# Patient Record
Sex: FEMALE | Race: WHITE | Employment: UNEMPLOYED | URBAN - METROPOLITAN AREA
[De-identification: names, ages, dates, MRNs, and addresses within clinical notes are randomized per-mention and may not be internally consistent; named-entity substitution may affect disease eponyms.]

---

## 2017-01-17 ENCOUNTER — TELEPHONE (OUTPATIENT)
Dept: FAMILY MEDICINE CLINIC | Age: 52
End: 2017-01-17

## 2017-01-26 ENCOUNTER — OFFICE VISIT (OUTPATIENT)
Dept: FAMILY MEDICINE CLINIC | Age: 52
End: 2017-01-26

## 2017-01-26 VITALS
TEMPERATURE: 97.2 F | BODY MASS INDEX: 35.82 KG/M2 | HEIGHT: 63 IN | RESPIRATION RATE: 12 BRPM | DIASTOLIC BLOOD PRESSURE: 82 MMHG | SYSTOLIC BLOOD PRESSURE: 130 MMHG | HEART RATE: 72 BPM | WEIGHT: 202.19 LBS

## 2017-01-26 DIAGNOSIS — F41.9 ANXIETY: ICD-10-CM

## 2017-01-26 DIAGNOSIS — R35.0 FREQUENT URINATION: ICD-10-CM

## 2017-01-26 DIAGNOSIS — R35.0 FREQUENT URINATION: Primary | ICD-10-CM

## 2017-01-26 DIAGNOSIS — E53.8 B12 DEFICIENCY: ICD-10-CM

## 2017-01-26 LAB
BILIRUBIN, POC: ABNORMAL
BLOOD URINE, POC: 25
CLARITY, POC: CLEAR
COLOR, POC: YELLOW
GLUCOSE URINE, POC: ABNORMAL
KETONES, POC: ABNORMAL
LEUKOCYTE EST, POC: ABNORMAL
NITRITE, POC: ABNORMAL
PH, POC: 7.5
PROTEIN, POC: ABNORMAL
SPECIFIC GRAVITY, POC: 1.02
UROBILINOGEN, POC: 2

## 2017-01-26 PROCEDURE — G8484 FLU IMMUNIZE NO ADMIN: HCPCS | Performed by: FAMILY MEDICINE

## 2017-01-26 PROCEDURE — G8419 CALC BMI OUT NRM PARAM NOF/U: HCPCS | Performed by: FAMILY MEDICINE

## 2017-01-26 PROCEDURE — 99213 OFFICE O/P EST LOW 20 MIN: CPT | Performed by: FAMILY MEDICINE

## 2017-01-26 PROCEDURE — 81003 URINALYSIS AUTO W/O SCOPE: CPT | Performed by: FAMILY MEDICINE

## 2017-01-26 PROCEDURE — 1036F TOBACCO NON-USER: CPT | Performed by: FAMILY MEDICINE

## 2017-01-26 PROCEDURE — G8427 DOCREV CUR MEDS BY ELIG CLIN: HCPCS | Performed by: FAMILY MEDICINE

## 2017-01-26 RX ORDER — HYDROCODONE BITARTRATE AND ACETAMINOPHEN 5; 325 MG/1; MG/1
1 TABLET ORAL EVERY 6 HOURS PRN
Qty: 60 TABLET | Refills: 0 | Status: SHIPPED | OUTPATIENT
Start: 2017-01-26 | End: 2017-03-14 | Stop reason: SDUPTHER

## 2017-01-26 RX ORDER — CIPROFLOXACIN 500 MG/1
500 TABLET, FILM COATED ORAL 2 TIMES DAILY
Qty: 20 TABLET | Refills: 0 | Status: SHIPPED | OUTPATIENT
Start: 2017-01-26 | End: 2017-02-05

## 2017-01-26 ASSESSMENT — ENCOUNTER SYMPTOMS
VOMITING: 0
ABDOMINAL PAIN: 0
COUGH: 0
EYES NEGATIVE: 1
NAUSEA: 0
SHORTNESS OF BREATH: 0
CONSTIPATION: 0
DIARRHEA: 0

## 2017-01-28 LAB — URINE CULTURE, ROUTINE: NORMAL

## 2017-02-24 RX ORDER — ESOMEPRAZOLE MAGNESIUM 40 MG/1
40 CAPSULE, DELAYED RELEASE ORAL DAILY
Qty: 60 CAPSULE | Refills: 5 | Status: SHIPPED | OUTPATIENT
Start: 2017-02-24 | End: 2021-08-03

## 2017-03-14 ENCOUNTER — OFFICE VISIT (OUTPATIENT)
Dept: FAMILY MEDICINE CLINIC | Age: 52
End: 2017-03-14

## 2017-03-14 VITALS
HEIGHT: 63 IN | SYSTOLIC BLOOD PRESSURE: 128 MMHG | WEIGHT: 201.38 LBS | TEMPERATURE: 97.5 F | DIASTOLIC BLOOD PRESSURE: 86 MMHG | BODY MASS INDEX: 35.68 KG/M2 | HEART RATE: 72 BPM | RESPIRATION RATE: 12 BRPM

## 2017-03-14 DIAGNOSIS — R10.9 CHRONIC ABDOMINAL PAIN: ICD-10-CM

## 2017-03-14 DIAGNOSIS — K58.9 IRRITABLE BOWEL SYNDROME WITHOUT DIARRHEA: ICD-10-CM

## 2017-03-14 DIAGNOSIS — F41.9 ANXIETY: ICD-10-CM

## 2017-03-14 DIAGNOSIS — M25.562 CHRONIC PAIN OF LEFT KNEE: Primary | ICD-10-CM

## 2017-03-14 DIAGNOSIS — D22.9 ATYPICAL MOLE: ICD-10-CM

## 2017-03-14 DIAGNOSIS — G89.29 CHRONIC PAIN OF LEFT KNEE: Primary | ICD-10-CM

## 2017-03-14 DIAGNOSIS — E53.8 B12 DEFICIENCY: ICD-10-CM

## 2017-03-14 DIAGNOSIS — G89.29 CHRONIC ABDOMINAL PAIN: ICD-10-CM

## 2017-03-14 PROCEDURE — G8427 DOCREV CUR MEDS BY ELIG CLIN: HCPCS | Performed by: FAMILY MEDICINE

## 2017-03-14 PROCEDURE — G8419 CALC BMI OUT NRM PARAM NOF/U: HCPCS | Performed by: FAMILY MEDICINE

## 2017-03-14 PROCEDURE — 1036F TOBACCO NON-USER: CPT | Performed by: FAMILY MEDICINE

## 2017-03-14 PROCEDURE — G8484 FLU IMMUNIZE NO ADMIN: HCPCS | Performed by: FAMILY MEDICINE

## 2017-03-14 PROCEDURE — 99214 OFFICE O/P EST MOD 30 MIN: CPT | Performed by: FAMILY MEDICINE

## 2017-03-14 RX ORDER — HYDROCODONE BITARTRATE AND ACETAMINOPHEN 5; 325 MG/1; MG/1
1 TABLET ORAL EVERY 6 HOURS PRN
Qty: 60 TABLET | Refills: 0 | Status: SHIPPED | OUTPATIENT
Start: 2017-03-14 | End: 2017-05-03 | Stop reason: SDUPTHER

## 2017-03-14 RX ORDER — METHYLPREDNISOLONE 4 MG/1
TABLET ORAL
Qty: 1 KIT | Refills: 0 | Status: SHIPPED | OUTPATIENT
Start: 2017-03-14 | End: 2017-03-20

## 2017-03-14 ASSESSMENT — ENCOUNTER SYMPTOMS
SORE THROAT: 1
SINUS PAIN: 1
COUGH: 1

## 2017-04-03 DIAGNOSIS — M25.562 CHRONIC PAIN OF LEFT KNEE: ICD-10-CM

## 2017-04-03 DIAGNOSIS — F41.9 ANXIETY: ICD-10-CM

## 2017-04-03 DIAGNOSIS — G89.29 CHRONIC PAIN OF LEFT KNEE: ICD-10-CM

## 2017-05-03 ENCOUNTER — PROCEDURE VISIT (OUTPATIENT)
Dept: FAMILY MEDICINE CLINIC | Age: 52
End: 2017-05-03

## 2017-05-03 VITALS
TEMPERATURE: 96.8 F | SYSTOLIC BLOOD PRESSURE: 122 MMHG | HEIGHT: 63 IN | BODY MASS INDEX: 34.75 KG/M2 | OXYGEN SATURATION: 98 % | DIASTOLIC BLOOD PRESSURE: 88 MMHG | HEART RATE: 79 BPM | WEIGHT: 196.13 LBS

## 2017-05-03 DIAGNOSIS — R53.81 OTHER MALAISE AND FATIGUE: ICD-10-CM

## 2017-05-03 DIAGNOSIS — D49.2 SKIN NEOPLASM: ICD-10-CM

## 2017-05-03 DIAGNOSIS — F41.9 ANXIETY: ICD-10-CM

## 2017-05-03 DIAGNOSIS — K58.9 IRRITABLE BOWEL SYNDROME WITHOUT DIARRHEA: ICD-10-CM

## 2017-05-03 DIAGNOSIS — E78.5 DYSLIPIDEMIA: ICD-10-CM

## 2017-05-03 DIAGNOSIS — L91.8 SKIN TAG: ICD-10-CM

## 2017-05-03 DIAGNOSIS — M25.50 ARTHRALGIA, UNSPECIFIED JOINT: Primary | ICD-10-CM

## 2017-05-03 DIAGNOSIS — M25.50 ARTHRALGIA, UNSPECIFIED JOINT: ICD-10-CM

## 2017-05-03 DIAGNOSIS — E53.8 B12 DEFICIENCY: ICD-10-CM

## 2017-05-03 DIAGNOSIS — R53.83 OTHER MALAISE AND FATIGUE: ICD-10-CM

## 2017-05-03 DIAGNOSIS — E55.9 VITAMIN D DEFICIENCY: ICD-10-CM

## 2017-05-03 LAB
ALBUMIN SERPL-MCNC: 4.3 G/DL (ref 3.9–4.9)
ALP BLD-CCNC: 84 U/L (ref 40–130)
ALT SERPL-CCNC: 54 U/L (ref 0–33)
ANION GAP SERPL CALCULATED.3IONS-SCNC: 13 MEQ/L (ref 7–13)
AST SERPL-CCNC: 66 U/L (ref 0–35)
BASOPHILS ABSOLUTE: 0.1 K/UL (ref 0–0.2)
BASOPHILS RELATIVE PERCENT: 0.9 %
BILIRUB SERPL-MCNC: 0.4 MG/DL (ref 0–1.2)
BUN BLDV-MCNC: 12 MG/DL (ref 6–20)
CALCIUM SERPL-MCNC: 8.9 MG/DL (ref 8.6–10.2)
CHLORIDE BLD-SCNC: 102 MEQ/L (ref 98–107)
CHOLESTEROL, TOTAL: 156 MG/DL (ref 0–199)
CO2: 25 MEQ/L (ref 22–29)
CREAT SERPL-MCNC: 0.46 MG/DL (ref 0.5–0.9)
EOSINOPHILS ABSOLUTE: 0.3 K/UL (ref 0–0.7)
EOSINOPHILS RELATIVE PERCENT: 2.5 %
FOLATE: 8.1 NG/ML (ref 7.3–26.1)
GFR AFRICAN AMERICAN: >60
GFR NON-AFRICAN AMERICAN: >60
GLOBULIN: 2.7 G/DL (ref 2.3–3.5)
GLUCOSE BLD-MCNC: 86 MG/DL (ref 74–109)
HCT VFR BLD CALC: 46.1 % (ref 37–47)
HDLC SERPL-MCNC: 28 MG/DL (ref 40–59)
HEMOGLOBIN: 15.5 G/DL (ref 12–16)
LDL CHOLESTEROL CALCULATED: 71 MG/DL (ref 0–129)
LYMPHOCYTES ABSOLUTE: 3 K/UL (ref 1–4.8)
LYMPHOCYTES RELATIVE PERCENT: 24.3 %
MCH RBC QN AUTO: 30.8 PG (ref 27–31.3)
MCHC RBC AUTO-ENTMCNC: 33.7 % (ref 33–37)
MCV RBC AUTO: 91.4 FL (ref 82–100)
MONOCYTES ABSOLUTE: 0.7 K/UL (ref 0.2–0.8)
MONOCYTES RELATIVE PERCENT: 6 %
NEUTROPHILS ABSOLUTE: 8.2 K/UL (ref 1.4–6.5)
NEUTROPHILS RELATIVE PERCENT: 66.3 %
PDW BLD-RTO: 14 % (ref 11.5–14.5)
PLATELET # BLD: 351 K/UL (ref 130–400)
POTASSIUM SERPL-SCNC: 4.4 MEQ/L (ref 3.5–5.1)
RBC # BLD: 5.04 M/UL (ref 4.2–5.4)
RHEUMATOID FACTOR: <10 IU/ML (ref 0–14)
SEDIMENTATION RATE, ERYTHROCYTE: 13 MM (ref 0–20)
SODIUM BLD-SCNC: 140 MEQ/L (ref 132–144)
T4 FREE: 1.04 NG/DL (ref 0.93–1.7)
TOTAL PROTEIN: 7 G/DL (ref 6.4–8.1)
TRIGL SERPL-MCNC: 283 MG/DL (ref 0–200)
TSH SERPL DL<=0.05 MIU/L-ACNC: 2.63 UIU/ML (ref 0.27–4.2)
VITAMIN B-12: 666 PG/ML (ref 211–946)
VITAMIN D 25-HYDROXY: 21.8 NG/ML (ref 30–100)
WBC # BLD: 12.4 K/UL (ref 4.8–10.8)

## 2017-05-03 PROCEDURE — 1036F TOBACCO NON-USER: CPT | Performed by: FAMILY MEDICINE

## 2017-05-03 PROCEDURE — G8427 DOCREV CUR MEDS BY ELIG CLIN: HCPCS | Performed by: FAMILY MEDICINE

## 2017-05-03 PROCEDURE — 11200 RMVL SKIN TAGS UP TO&INC 15: CPT | Performed by: FAMILY MEDICINE

## 2017-05-03 PROCEDURE — 99213 OFFICE O/P EST LOW 20 MIN: CPT | Performed by: FAMILY MEDICINE

## 2017-05-03 PROCEDURE — G8419 CALC BMI OUT NRM PARAM NOF/U: HCPCS | Performed by: FAMILY MEDICINE

## 2017-05-03 RX ORDER — HYDROCODONE BITARTRATE AND ACETAMINOPHEN 5; 325 MG/1; MG/1
1 TABLET ORAL EVERY 6 HOURS PRN
Qty: 60 TABLET | Refills: 0 | Status: SHIPPED | OUTPATIENT
Start: 2017-05-03 | End: 2017-07-12 | Stop reason: SDUPTHER

## 2017-05-04 LAB
ANA INTERPRETATION: NORMAL
ANTI-NUCLEAR ANTIBODY (ANA): NEGATIVE

## 2017-05-10 RX ORDER — ERGOCALCIFEROL 1.25 MG/1
50000 CAPSULE ORAL WEEKLY
Qty: 12 CAPSULE | Refills: 1 | Status: SHIPPED | OUTPATIENT
Start: 2017-05-10 | End: 2017-10-12 | Stop reason: SDUPTHER

## 2017-07-12 ENCOUNTER — OFFICE VISIT (OUTPATIENT)
Dept: FAMILY MEDICINE CLINIC | Age: 52
End: 2017-07-12

## 2017-07-12 VITALS
HEIGHT: 63 IN | OXYGEN SATURATION: 97 % | SYSTOLIC BLOOD PRESSURE: 130 MMHG | HEART RATE: 79 BPM | BODY MASS INDEX: 33.73 KG/M2 | TEMPERATURE: 96.7 F | WEIGHT: 190.38 LBS | DIASTOLIC BLOOD PRESSURE: 88 MMHG

## 2017-07-12 DIAGNOSIS — G89.29 CHRONIC PAIN OF BOTH KNEES: ICD-10-CM

## 2017-07-12 DIAGNOSIS — M25.561 CHRONIC PAIN OF BOTH KNEES: ICD-10-CM

## 2017-07-12 DIAGNOSIS — K21.00 GASTROESOPHAGEAL REFLUX DISEASE WITH ESOPHAGITIS: Primary | ICD-10-CM

## 2017-07-12 DIAGNOSIS — M25.562 CHRONIC PAIN OF BOTH KNEES: ICD-10-CM

## 2017-07-12 PROCEDURE — G8427 DOCREV CUR MEDS BY ELIG CLIN: HCPCS | Performed by: FAMILY MEDICINE

## 2017-07-12 PROCEDURE — G8419 CALC BMI OUT NRM PARAM NOF/U: HCPCS | Performed by: FAMILY MEDICINE

## 2017-07-12 PROCEDURE — 1036F TOBACCO NON-USER: CPT | Performed by: FAMILY MEDICINE

## 2017-07-12 PROCEDURE — 99213 OFFICE O/P EST LOW 20 MIN: CPT | Performed by: FAMILY MEDICINE

## 2017-07-12 RX ORDER — ESOMEPRAZOLE MAGNESIUM 40 MG/1
CAPSULE, DELAYED RELEASE ORAL
Qty: 60 CAPSULE | Refills: 3
Start: 2017-07-12 | End: 2017-10-12 | Stop reason: SDUPTHER

## 2017-07-12 RX ORDER — HYDROCODONE BITARTRATE AND ACETAMINOPHEN 5; 325 MG/1; MG/1
1 TABLET ORAL EVERY 6 HOURS PRN
Qty: 60 TABLET | Refills: 0 | Status: SHIPPED | OUTPATIENT
Start: 2017-07-12 | End: 2017-10-12 | Stop reason: SDUPTHER

## 2017-10-12 ENCOUNTER — OFFICE VISIT (OUTPATIENT)
Dept: FAMILY MEDICINE CLINIC | Age: 52
End: 2017-10-12

## 2017-10-12 VITALS
HEART RATE: 68 BPM | SYSTOLIC BLOOD PRESSURE: 114 MMHG | TEMPERATURE: 97.5 F | WEIGHT: 188.5 LBS | DIASTOLIC BLOOD PRESSURE: 86 MMHG | HEIGHT: 63 IN | BODY MASS INDEX: 33.4 KG/M2 | RESPIRATION RATE: 16 BRPM

## 2017-10-12 DIAGNOSIS — J06.9 ACUTE UPPER RESPIRATORY INFECTION: Primary | ICD-10-CM

## 2017-10-12 DIAGNOSIS — R10.9 CHRONIC ABDOMINAL PAIN: ICD-10-CM

## 2017-10-12 DIAGNOSIS — E55.9 VITAMIN D DEFICIENCY: ICD-10-CM

## 2017-10-12 DIAGNOSIS — E53.8 B12 DEFICIENCY: ICD-10-CM

## 2017-10-12 DIAGNOSIS — G89.29 CHRONIC ABDOMINAL PAIN: ICD-10-CM

## 2017-10-12 LAB — VITAMIN D 25-HYDROXY: 34.6 NG/ML (ref 30–100)

## 2017-10-12 PROCEDURE — G8419 CALC BMI OUT NRM PARAM NOF/U: HCPCS | Performed by: FAMILY MEDICINE

## 2017-10-12 PROCEDURE — 1036F TOBACCO NON-USER: CPT | Performed by: FAMILY MEDICINE

## 2017-10-12 PROCEDURE — 99213 OFFICE O/P EST LOW 20 MIN: CPT | Performed by: FAMILY MEDICINE

## 2017-10-12 PROCEDURE — G8427 DOCREV CUR MEDS BY ELIG CLIN: HCPCS | Performed by: FAMILY MEDICINE

## 2017-10-12 PROCEDURE — G8484 FLU IMMUNIZE NO ADMIN: HCPCS | Performed by: FAMILY MEDICINE

## 2017-10-12 RX ORDER — ERGOCALCIFEROL 1.25 MG/1
50000 CAPSULE ORAL WEEKLY
Qty: 12 CAPSULE | Refills: 1 | Status: SHIPPED | OUTPATIENT
Start: 2017-10-12

## 2017-10-12 RX ORDER — HYDROCODONE BITARTRATE AND ACETAMINOPHEN 5; 325 MG/1; MG/1
1 TABLET ORAL EVERY 6 HOURS PRN
Qty: 60 TABLET | Refills: 0 | Status: SHIPPED | OUTPATIENT
Start: 2017-10-12 | End: 2018-01-05 | Stop reason: SDUPTHER

## 2017-10-12 ASSESSMENT — PATIENT HEALTH QUESTIONNAIRE - PHQ9
1. LITTLE INTEREST OR PLEASURE IN DOING THINGS: 0
SUM OF ALL RESPONSES TO PHQ9 QUESTIONS 1 & 2: 0
2. FEELING DOWN, DEPRESSED OR HOPELESS: 0
SUM OF ALL RESPONSES TO PHQ QUESTIONS 1-9: 0

## 2017-10-12 NOTE — PROGRESS NOTES
Social History     Social History    Marital status:      Spouse name: N/A    Number of children: N/A    Years of education: N/A     Occupational History    Not on file. Social History Main Topics    Smoking status: Former Smoker     Packs/day: 1.00     Years: 12.00     Types: Cigarettes     Quit date: 6/30/2012    Smokeless tobacco: Never Used    Alcohol use No    Drug use: No    Sexual activity: Not on file     Other Topics Concern    Not on file     Social History Narrative    No narrative on file     Family History   Problem Relation Age of Onset    Diabetes Mother     Heart Disease Mother      stints    Diabetes Sister     High Blood Pressure Sister      Past Medical History:   Diagnosis Date    Anxiety     GERD (gastroesophageal reflux disease)     History of cellulitis and abscess     Irritable bowel syndrome      Past Surgical History:   Procedure Laterality Date    HERNIA REPAIR  1992, 2012    HYSTERECTOMY  2009    OTHER SURGICAL HISTORY  02/11/15    DR Ariadna Méndez  EXCISION OF LT LOWER QUAD NONHEALING SKIN LESION    SMALL INTESTINE SURGERY  2012    bowel obstruction @ [address] [facility] [address] Ruddy Jimenez  [id number]    DR. MAGALLON         REVIEW OF SYSTEMS:   REVIEW OF SYSTEMS:   Patient seen today for exam.  Denies any problems with hearing, headaches or vision. Denies any shortness of breath, chest pain, nausea or vomiting. No black stool, no blood in the stool. No heartburn. Denies any problems with constipation or diarrhea either. No dysuria type symptoms. Objective:     /86 (Site: Left Arm, Position: Sitting, Cuff Size: Medium Adult)   Pulse 68   Temp 97.5 °F (36.4 °C)   Resp 16   Ht 5' 3\" (1.6 m)   Wt 188 lb 8 oz (85.5 kg)   LMP 01/01/2009   BMI 33.39 kg/m²     Physical Exam      PHYSICAL EXAMINATION:  Vital signs as noted. Alert, oriented in no acute distress. HEENT:  TMs are showing fluid bilaterally. Pharynx reveals postnasal drainage noted. Positive pain over sinuses and forehead  Pos shoddy adenopathy noted bilaterallyNECK: supple. HEART:  RRR without gallops. LUNGS:  clear to auscultation, no wheezing or rhonchi. ABDOMEN:  soft and nontender, bowel sounds positive. EXTREMITIES:  no edema. SKIN: without any changes      Assessment:      1. Acute upper respiratory infection     2. Vitamin D deficiency  vitamin D (ERGOCALCIFEROL) 71950 units CAPS capsule    HYDROcodone-acetaminophen (NORCO) 5-325 MG per tablet    Vitamin D 25 Hydroxy   3. B12 deficiency     4. Chronic abdominal pain               Plan:        Orders Placed This Encounter   Medications    vitamin D (ERGOCALCIFEROL) 82203 units CAPS capsule     Sig: Take 1 capsule by mouth once a week     Dispense:  12 capsule     Refill:  1    HYDROcodone-acetaminophen (NORCO) 5-325 MG per tablet     Sig: Take 1 tablet by mouth every 6 hours as needed for Pain .  Earliest Fill Date: 10/12/17     Dispense:  60 tablet     Refill:  0     Orders Placed This Encounter   Procedures    Vitamin D 25 Hydroxy     Standing Status:   Future     Number of Occurrences:   1     Standing Expiration Date:   10/12/2018           Health Maintenance Due   Topic Date Due    HIV screen  10/23/1982    DTaP/Tdap/Td vaccine (1 - Tdap) 10/23/1986    Flu vaccine (1) 09/01/2017             Controlled Substances Monitoring:        Since the pain pills are helping somewhat she'll continue that she is barely smoking down to one to serous per day and doesn't take Xanax at all anymore she's doing much better with her stress regarding this mesh situation should take the antibiotic solution worse over this next week            If anything worsens or changes please call us at once , follow-up in the office as planned,

## 2018-01-05 ENCOUNTER — OFFICE VISIT (OUTPATIENT)
Dept: FAMILY MEDICINE CLINIC | Age: 53
End: 2018-01-05

## 2018-01-05 VITALS
TEMPERATURE: 97.1 F | DIASTOLIC BLOOD PRESSURE: 86 MMHG | WEIGHT: 188.25 LBS | HEIGHT: 63 IN | RESPIRATION RATE: 12 BRPM | BODY MASS INDEX: 33.36 KG/M2 | SYSTOLIC BLOOD PRESSURE: 136 MMHG | HEART RATE: 68 BPM

## 2018-01-05 DIAGNOSIS — F41.9 ANXIETY: ICD-10-CM

## 2018-01-05 DIAGNOSIS — R10.84 GENERALIZED ABDOMINAL PAIN: Primary | ICD-10-CM

## 2018-01-05 DIAGNOSIS — K58.9 IRRITABLE BOWEL SYNDROME WITHOUT DIARRHEA: ICD-10-CM

## 2018-01-05 DIAGNOSIS — E55.9 VITAMIN D DEFICIENCY: ICD-10-CM

## 2018-01-05 DIAGNOSIS — E53.8 B12 DEFICIENCY: ICD-10-CM

## 2018-01-05 PROCEDURE — 1036F TOBACCO NON-USER: CPT | Performed by: FAMILY MEDICINE

## 2018-01-05 PROCEDURE — 3017F COLORECTAL CA SCREEN DOC REV: CPT | Performed by: FAMILY MEDICINE

## 2018-01-05 PROCEDURE — G8427 DOCREV CUR MEDS BY ELIG CLIN: HCPCS | Performed by: FAMILY MEDICINE

## 2018-01-05 PROCEDURE — G8484 FLU IMMUNIZE NO ADMIN: HCPCS | Performed by: FAMILY MEDICINE

## 2018-01-05 PROCEDURE — 99214 OFFICE O/P EST MOD 30 MIN: CPT | Performed by: FAMILY MEDICINE

## 2018-01-05 PROCEDURE — G8419 CALC BMI OUT NRM PARAM NOF/U: HCPCS | Performed by: FAMILY MEDICINE

## 2018-01-05 RX ORDER — HYDROCODONE BITARTRATE AND ACETAMINOPHEN 5; 325 MG/1; MG/1
1 TABLET ORAL EVERY 6 HOURS PRN
Qty: 60 TABLET | Refills: 0 | Status: SHIPPED | OUTPATIENT
Start: 2018-01-05 | End: 2018-02-05 | Stop reason: SDUPTHER

## 2018-01-05 RX ORDER — DULOXETIN HYDROCHLORIDE 30 MG/1
30 CAPSULE, DELAYED RELEASE ORAL DAILY
Qty: 30 CAPSULE | Refills: 3 | Status: SHIPPED | OUTPATIENT
Start: 2018-01-05 | End: 2021-08-03

## 2018-01-17 ENCOUNTER — OFFICE VISIT (OUTPATIENT)
Dept: SURGERY | Age: 53
End: 2018-01-17

## 2018-01-17 VITALS
BODY MASS INDEX: 32.78 KG/M2 | SYSTOLIC BLOOD PRESSURE: 132 MMHG | WEIGHT: 185 LBS | OXYGEN SATURATION: 93 % | DIASTOLIC BLOOD PRESSURE: 62 MMHG | HEART RATE: 97 BPM | HEIGHT: 63 IN

## 2018-01-17 DIAGNOSIS — R10.84 GENERALIZED ABDOMINAL PAIN: Primary | ICD-10-CM

## 2018-01-17 PROCEDURE — G8484 FLU IMMUNIZE NO ADMIN: HCPCS | Performed by: COLON & RECTAL SURGERY

## 2018-01-17 PROCEDURE — 99244 OFF/OP CNSLTJ NEW/EST MOD 40: CPT | Performed by: COLON & RECTAL SURGERY

## 2018-01-17 PROCEDURE — G8427 DOCREV CUR MEDS BY ELIG CLIN: HCPCS | Performed by: COLON & RECTAL SURGERY

## 2018-01-17 PROCEDURE — G8417 CALC BMI ABV UP PARAM F/U: HCPCS | Performed by: COLON & RECTAL SURGERY

## 2018-01-17 PROCEDURE — 3017F COLORECTAL CA SCREEN DOC REV: CPT | Performed by: COLON & RECTAL SURGERY

## 2018-01-17 ASSESSMENT — ENCOUNTER SYMPTOMS
CONSTIPATION: 1
EYES NEGATIVE: 1
NAUSEA: 1
ABDOMINAL DISTENTION: 1
DIARRHEA: 1
WHEEZING: 1
ABDOMINAL PAIN: 1
SHORTNESS OF BREATH: 1
ALLERGIC/IMMUNOLOGIC NEGATIVE: 1
APNEA: 1

## 2018-01-22 ENCOUNTER — TELEPHONE (OUTPATIENT)
Dept: SURGERY | Age: 53
End: 2018-01-22

## 2018-01-31 NOTE — TELEPHONE ENCOUNTER
Called pt   Told pt that the pain management referral has been sent.   Pt said they will call and make there appt with pain management tomorrow and thank you

## 2018-02-21 ENCOUNTER — INITIAL CONSULT (OUTPATIENT)
Dept: PODIATRY | Facility: CLINIC | Age: 53
End: 2018-02-21

## 2018-02-21 VITALS — TEMPERATURE: 97.6 F | BODY MASS INDEX: 33.12 KG/M2 | WEIGHT: 194 LBS | HEIGHT: 64 IN

## 2018-02-21 DIAGNOSIS — M79.672 PAIN IN BOTH FEET: Primary | ICD-10-CM

## 2018-02-21 DIAGNOSIS — M79.671 PAIN IN BOTH FEET: Primary | ICD-10-CM

## 2018-02-21 DIAGNOSIS — G57.62 MORTON'S NEUROMA, LEFT: ICD-10-CM

## 2018-02-21 DIAGNOSIS — G57.61 MORTON NEUROMA, RIGHT: ICD-10-CM

## 2018-02-21 ASSESSMENT — ENCOUNTER SYMPTOMS
SHORTNESS OF BREATH: 0
NAUSEA: 0
CONSTIPATION: 0
BACK PAIN: 0
DIARRHEA: 0

## 2018-02-21 NOTE — PROGRESS NOTES
Jaimie Monday  (1965)    2/21/18    Subjective:     Elrin Gabriel Monday is 50 y.o. female who complains today of:    Chief Complaint   Patient presents with    Foot Pain     bilateral; \"pins and needles feeling\"        HPI: The patient presents with complaint of bilateral foot pain. She states that the left foot pain is worse than the right foot. The pain has been present for the past 18 months. She states that pain is getting worse. She describes having tingling sensation as well as throbbing sensations. She states that occasionally she will fee like he has a pebble in her left shoe when there is nothing there. She states that massaging the area will relieve the pain temporarily. She states that she has tightness of the hamstring and calf muscles, she thinks this has gotten worse worse since her abdominal surgeries. She does report a family history of diabetes but her laboratory levels have never shown diabetes. Review of Systems   Constitutional: Negative for fever. HENT: Negative for hearing loss. Respiratory: Negative for shortness of breath. Gastrointestinal: Negative for constipation, diarrhea and nausea. Genitourinary: Negative for difficulty urinating. Musculoskeletal: Negative for back pain and neck pain. Skin: Negative for rash. Neurological: Negative for headaches. Hematological: Bruises/bleeds easily. Psychiatric/Behavioral: Positive for sleep disturbance. She has not tried physical therapy. Date of last of last PT treatment: none    The patient is not a diabetic. Allergies:  Macrobid [nitrofurantoin monohydrate macrocrystals] and Flagyl [metronidazole]    Current Outpatient Prescriptions on File Prior to Visit   Medication Sig Dispense Refill    HYDROcodone-acetaminophen (NORCO) 5-325 MG per tablet Take 1 tablet by mouth 3 times daily as needed for Pain for up to 30 days.  Earliest Fill Date: 2/5/18 90 tablet 0    gabapentin (NEURONTIN) 300 MG

## 2018-03-02 ENCOUNTER — OFFICE VISIT (OUTPATIENT)
Dept: PODIATRY | Facility: CLINIC | Age: 53
End: 2018-03-02

## 2018-03-02 VITALS — WEIGHT: 183 LBS | BODY MASS INDEX: 31.24 KG/M2 | HEIGHT: 64 IN

## 2018-03-02 DIAGNOSIS — G57.62 LESION OF LEFT PLANTAR NERVE: ICD-10-CM

## 2018-03-02 DIAGNOSIS — M79.672 PAIN IN BOTH FEET: Primary | ICD-10-CM

## 2018-03-02 DIAGNOSIS — M79.671 PAIN IN BOTH FEET: Primary | ICD-10-CM

## 2018-03-02 DIAGNOSIS — G57.61 LESION OF RIGHT PLANTAR NERVE: ICD-10-CM

## 2018-03-02 ASSESSMENT — ENCOUNTER SYMPTOMS
SHORTNESS OF BREATH: 0
DIARRHEA: 0
NAUSEA: 0
CONSTIPATION: 0
BACK PAIN: 0

## 2018-03-02 NOTE — PROGRESS NOTES
syndrome      Past Surgical History:   Procedure Laterality Date    HERNIA REPAIR  1992, 2012    HYSTERECTOMY  2009    OTHER SURGICAL HISTORY  02/11/15    DR Jack Day  EXCISION OF LT LOWER QUAD NONHEALING SKIN LESION    SMALL INTESTINE SURGERY  2012    bowel obstruction @ [facility] Dr. Kody Tucker  [id number]    DR. MAGALLON     Social History     Social History    Marital status:      Spouse name: N/A    Number of children: N/A    Years of education: N/A     Occupational History    Not on file. Social History Main Topics    Smoking status: Former Smoker     Packs/day: 1.00     Years: 12.00     Types: Cigarettes     Quit date: 6/30/2012    Smokeless tobacco: Never Used    Alcohol use No    Drug use: No    Sexual activity: Not on file     Other Topics Concern    Not on file     Social History Narrative    No narrative on file     Family History   Problem Relation Age of Onset    Diabetes Mother     Heart Disease Mother      stints    Diabetes Sister     High Blood Pressure Sister            Objective:   Vitals:  Ht 5' 4\" (1.626 m)   Wt 183 lb (83 kg)   LMP 01/01/2009   BMI 31.41 kg/m² Pain Score:   5    Physical Exam    Vascular:   Dorsalis pedis pulse is graded at 2/4 bilateral foot. Posterior tibial pulse is graded at 1/4 bilateral foot. There is no edema noted to the bilateral lower extremity. Capillary refill is immediate bilateral hallux. Hair growth is present bilaterally.     Neurological:   Protective sensation is intact bilaterally, diminished light touch sensation noted to the left hallux. Vibratory sensation is intact bilaterally. Sharp/dull sensation is intact bilaterally. Patellar deep tendon reflex is graded at 2/4 bilaterally. Achilles tendon deep tendon reflex is graded at 1/4 bilaterally. The Babinski response is negative bilaterally. No ankle clonus is noted bilaterally.   (+) Tinel's sign with percussion of the left tibial nerve. (+) Omar sign and radiating pain with palpation of the common digital nerve left foot third metatarsal interspace. (-) Omar sign and point tenderness with palpation of the common digital nerve right foot third metatarsal interspace.     Dermatological:  No open lesions noted bilateral foot. Normal skin texture and turgor noted bilaterally. Webspaces are intact bilateral foot.     Musculoskeletal:  Rectus foot type noted bilaterally. Manual muscle strength is graded at 5/5 for all groups tested bilateral foot. No gross static deformities noted. No pain or crepitus noted with active or passive range of motion of the joints of the foot or ankle bilaterally. Decreased ankle joint dorsiflexion is noted bilaterally. There is splaying of the left foot toes 3 and 4 with simulated weightbearing.       Assessment:     1. Pain in both feet     2. Lesion of right plantar nerve     3. Lesion of left plantar nerve         Plan:     Bilateral neuroma: I have instructed the patient to continue the previously enacted conservative measures. The patient is evaluated and found to have a biomechanical dysfunction causing  significant pain. It is recommended that use of a custom orthotic be undertaken to reduce this pain. After obtaining appropriate range of motion measurements of the hindfoot to forefoot relationship, negative plaster casting impressions of the right and left feet were made using a neutral suspension technique. Plaster casting material () was used for the right and left foot to make the  impressions. These functional foot orthotics will be packaged, handled, and mailed to an outside  laboratory and fashioned as removable devices. The patient will be contacted when the orthotics have arrived. Follow up:  Return for dispensing of orthotics.     Roosevelt Luna DPM

## 2018-04-14 ENCOUNTER — APPOINTMENT (OUTPATIENT)
Dept: CT IMAGING | Age: 53
End: 2018-04-14
Payer: COMMERCIAL

## 2018-04-14 ENCOUNTER — HOSPITAL ENCOUNTER (EMERGENCY)
Age: 53
Discharge: HOME | End: 2018-04-14
Attending: EMERGENCY MEDICINE
Payer: COMMERCIAL

## 2018-04-14 VITALS
WEIGHT: 181 LBS | TEMPERATURE: 97.9 F | SYSTOLIC BLOOD PRESSURE: 150 MMHG | OXYGEN SATURATION: 98 % | HEIGHT: 64 IN | BODY MASS INDEX: 30.9 KG/M2 | RESPIRATION RATE: 18 BRPM | HEART RATE: 79 BPM | DIASTOLIC BLOOD PRESSURE: 89 MMHG

## 2018-04-14 DIAGNOSIS — R10.31 RIGHT LOWER QUADRANT ABDOMINAL PAIN: Primary | ICD-10-CM

## 2018-04-14 LAB
ALBUMIN SERPL-MCNC: 4.6 G/DL (ref 3.9–4.9)
ALP BLD-CCNC: 71 U/L (ref 40–130)
ALT SERPL-CCNC: 47 U/L (ref 0–33)
AMYLASE: 44 U/L (ref 28–100)
ANION GAP SERPL CALCULATED.3IONS-SCNC: 15 MEQ/L (ref 7–13)
AST SERPL-CCNC: 40 U/L (ref 0–35)
BASOPHILS ABSOLUTE: 0 K/UL (ref 0–0.2)
BASOPHILS RELATIVE PERCENT: 0.3 %
BILIRUB SERPL-MCNC: 0.4 MG/DL (ref 0–1.2)
BILIRUBIN URINE: NEGATIVE
BLOOD, URINE: NEGATIVE
BUN BLDV-MCNC: 17 MG/DL (ref 6–20)
CALCIUM SERPL-MCNC: 9.3 MG/DL (ref 8.6–10.2)
CHLORIDE BLD-SCNC: 99 MEQ/L (ref 98–107)
CLARITY: CLEAR
CO2: 26 MEQ/L (ref 22–29)
COLOR: YELLOW
CREAT SERPL-MCNC: 0.4 MG/DL (ref 0.5–0.9)
EOSINOPHILS ABSOLUTE: 0.3 K/UL (ref 0–0.7)
EOSINOPHILS RELATIVE PERCENT: 2.6 %
GFR AFRICAN AMERICAN: >60
GFR NON-AFRICAN AMERICAN: >60
GLOBULIN: 2.2 G/DL (ref 2.3–3.5)
GLUCOSE BLD-MCNC: 88 MG/DL (ref 74–109)
GLUCOSE URINE: NEGATIVE MG/DL
HCT VFR BLD CALC: 45.1 % (ref 37–47)
HEMOGLOBIN: 15.3 G/DL (ref 12–16)
INR BLD: 1
KETONES, URINE: NEGATIVE MG/DL
LEUKOCYTE ESTERASE, URINE: NEGATIVE
LIPASE: 32 U/L (ref 13–60)
LYMPHOCYTES ABSOLUTE: 3.6 K/UL (ref 1–4.8)
LYMPHOCYTES RELATIVE PERCENT: 30 %
MCH RBC QN AUTO: 31.7 PG (ref 27–31.3)
MCHC RBC AUTO-ENTMCNC: 34 % (ref 33–37)
MCV RBC AUTO: 93.2 FL (ref 82–100)
MONOCYTES ABSOLUTE: 0.8 K/UL (ref 0.2–0.8)
MONOCYTES RELATIVE PERCENT: 6.6 %
NEUTROPHILS ABSOLUTE: 7.3 K/UL (ref 1.4–6.5)
NEUTROPHILS RELATIVE PERCENT: 60.5 %
NITRITE, URINE: NEGATIVE
PDW BLD-RTO: 13.3 % (ref 11.5–14.5)
PH UA: 5 (ref 5–9)
PLATELET # BLD: 329 K/UL (ref 130–400)
POTASSIUM SERPL-SCNC: 4.3 MEQ/L (ref 3.5–5.1)
PROTEIN UA: NEGATIVE MG/DL
PROTHROMBIN TIME: 10.7 SEC (ref 9.6–12.3)
RBC # BLD: 4.84 M/UL (ref 4.2–5.4)
SODIUM BLD-SCNC: 140 MEQ/L (ref 132–144)
SPECIFIC GRAVITY UA: 1.02 (ref 1–1.03)
TOTAL PROTEIN: 6.8 G/DL (ref 6.4–8.1)
URINE REFLEX TO CULTURE: NORMAL
UROBILINOGEN, URINE: 0.2 E.U./DL
WBC # BLD: 12 K/UL (ref 4.8–10.8)

## 2018-04-14 PROCEDURE — 96374 THER/PROPH/DIAG INJ IV PUSH: CPT

## 2018-04-14 PROCEDURE — 81003 URINALYSIS AUTO W/O SCOPE: CPT

## 2018-04-14 PROCEDURE — 80053 COMPREHEN METABOLIC PANEL: CPT

## 2018-04-14 PROCEDURE — 83690 ASSAY OF LIPASE: CPT

## 2018-04-14 PROCEDURE — 82150 ASSAY OF AMYLASE: CPT

## 2018-04-14 PROCEDURE — 85610 PROTHROMBIN TIME: CPT

## 2018-04-14 PROCEDURE — 2580000003 HC RX 258: Performed by: EMERGENCY MEDICINE

## 2018-04-14 PROCEDURE — 36415 COLL VENOUS BLD VENIPUNCTURE: CPT

## 2018-04-14 PROCEDURE — 85025 COMPLETE CBC W/AUTO DIFF WBC: CPT

## 2018-04-14 PROCEDURE — 96375 TX/PRO/DX INJ NEW DRUG ADDON: CPT

## 2018-04-14 PROCEDURE — 74176 CT ABD & PELVIS W/O CONTRAST: CPT

## 2018-04-14 PROCEDURE — 99284 EMERGENCY DEPT VISIT MOD MDM: CPT

## 2018-04-14 PROCEDURE — 6360000002 HC RX W HCPCS: Performed by: EMERGENCY MEDICINE

## 2018-04-14 RX ORDER — ONDANSETRON 2 MG/ML
4 INJECTION INTRAMUSCULAR; INTRAVENOUS ONCE
Status: COMPLETED | OUTPATIENT
Start: 2018-04-14 | End: 2018-04-14

## 2018-04-14 RX ORDER — DICYCLOMINE HYDROCHLORIDE 10 MG/1
10 CAPSULE ORAL EVERY 6 HOURS PRN
Qty: 20 CAPSULE | Refills: 0 | Status: SHIPPED | OUTPATIENT
Start: 2018-04-14 | End: 2021-08-03

## 2018-04-14 RX ORDER — HYDROCODONE BITARTRATE AND ACETAMINOPHEN 5; 325 MG/1; MG/1
1 TABLET ORAL EVERY 6 HOURS PRN
Qty: 10 TABLET | Refills: 0 | Status: SHIPPED | OUTPATIENT
Start: 2018-04-14 | End: 2018-04-21

## 2018-04-14 RX ORDER — SODIUM CHLORIDE 0.9 % (FLUSH) 0.9 %
3 SYRINGE (ML) INJECTION EVERY 8 HOURS
Status: DISCONTINUED | OUTPATIENT
Start: 2018-04-14 | End: 2018-04-14 | Stop reason: HOSPADM

## 2018-04-14 RX ORDER — 0.9 % SODIUM CHLORIDE 0.9 %
500 INTRAVENOUS SOLUTION INTRAVENOUS ONCE
Status: COMPLETED | OUTPATIENT
Start: 2018-04-14 | End: 2018-04-14

## 2018-04-14 RX ADMIN — SODIUM CHLORIDE 500 ML: 9 INJECTION, SOLUTION INTRAVENOUS at 02:48

## 2018-04-14 RX ADMIN — Medication 0.5 MG: at 02:49

## 2018-04-14 RX ADMIN — ONDANSETRON 4 MG: 2 INJECTION INTRAMUSCULAR; INTRAVENOUS at 02:48

## 2018-04-14 RX ADMIN — Medication 3 ML: at 02:50

## 2018-04-14 ASSESSMENT — PAIN DESCRIPTION - LOCATION: LOCATION: ABDOMEN

## 2018-04-14 ASSESSMENT — PAIN SCALES - GENERAL
PAINLEVEL_OUTOF10: 6
PAINLEVEL_OUTOF10: 6

## 2018-04-14 ASSESSMENT — PAIN DESCRIPTION - ORIENTATION: ORIENTATION: LOWER;RIGHT

## 2018-07-06 ENCOUNTER — OFFICE VISIT (OUTPATIENT)
Dept: INFECTIOUS DISEASES | Age: 53
End: 2018-07-06
Payer: COMMERCIAL

## 2018-07-06 VITALS
HEART RATE: 73 BPM | DIASTOLIC BLOOD PRESSURE: 76 MMHG | SYSTOLIC BLOOD PRESSURE: 125 MMHG | TEMPERATURE: 97.9 F | HEIGHT: 64 IN | BODY MASS INDEX: 29.33 KG/M2 | RESPIRATION RATE: 16 BRPM | WEIGHT: 171.8 LBS

## 2018-07-06 DIAGNOSIS — R79.89 ELEVATED LFTS: Primary | ICD-10-CM

## 2018-07-06 DIAGNOSIS — E55.9 VITAMIN D DEFICIENCY: ICD-10-CM

## 2018-07-06 DIAGNOSIS — M25.50 POLYARTHRALGIA: ICD-10-CM

## 2018-07-06 PROCEDURE — G8427 DOCREV CUR MEDS BY ELIG CLIN: HCPCS | Performed by: INTERNAL MEDICINE

## 2018-07-06 PROCEDURE — G8417 CALC BMI ABV UP PARAM F/U: HCPCS | Performed by: INTERNAL MEDICINE

## 2018-07-06 PROCEDURE — 4004F PT TOBACCO SCREEN RCVD TLK: CPT | Performed by: INTERNAL MEDICINE

## 2018-07-06 PROCEDURE — 99204 OFFICE O/P NEW MOD 45 MIN: CPT | Performed by: INTERNAL MEDICINE

## 2018-07-06 PROCEDURE — 3017F COLORECTAL CA SCREEN DOC REV: CPT | Performed by: INTERNAL MEDICINE

## 2018-07-17 ENCOUNTER — HOSPITAL ENCOUNTER (OUTPATIENT)
Dept: ULTRASOUND IMAGING | Age: 53
Discharge: HOME | End: 2018-07-19
Payer: COMMERCIAL

## 2018-07-17 DIAGNOSIS — R79.89 ELEVATED LFTS: ICD-10-CM

## 2018-07-17 DIAGNOSIS — E55.9 VITAMIN D DEFICIENCY: ICD-10-CM

## 2018-07-17 DIAGNOSIS — M25.50 POLYARTHRALGIA: ICD-10-CM

## 2018-07-17 LAB
ALBUMIN SERPL-MCNC: 4.7 G/DL (ref 3.9–4.9)
ALP BLD-CCNC: 76 U/L (ref 40–130)
ALT SERPL-CCNC: 40 U/L (ref 0–33)
ANION GAP SERPL CALCULATED.3IONS-SCNC: 10 MEQ/L (ref 7–13)
AST SERPL-CCNC: 32 U/L (ref 0–35)
BILIRUB SERPL-MCNC: 0.5 MG/DL (ref 0–1.2)
BUN BLDV-MCNC: 14 MG/DL (ref 6–20)
C-REACTIVE PROTEIN: 7 MG/L (ref 0–5)
CALCIUM SERPL-MCNC: 9.4 MG/DL (ref 8.6–10.2)
CHLORIDE BLD-SCNC: 104 MEQ/L (ref 98–107)
CO2: 30 MEQ/L (ref 22–29)
CREAT SERPL-MCNC: 0.45 MG/DL (ref 0.5–0.9)
GFR AFRICAN AMERICAN: >60
GFR NON-AFRICAN AMERICAN: >60
GLOBULIN: 2.8 G/DL (ref 2.3–3.5)
GLUCOSE BLD-MCNC: 93 MG/DL (ref 74–109)
HAV IGM SER IA-ACNC: NORMAL
HCT VFR BLD CALC: 48.2 % (ref 37–47)
HEMOGLOBIN: 16.3 G/DL (ref 12–16)
HEPATITIS B CORE IGM ANTIBODY: NORMAL
HEPATITIS B SURFACE ANTIGEN INTERPRETATION: NORMAL
HEPATITIS C ANTIBODY INTERPRETATION: NORMAL
HEPATITIS INTERPRETATION:: NORMAL
MCH RBC QN AUTO: 31.8 PG (ref 27–31.3)
MCHC RBC AUTO-ENTMCNC: 33.8 % (ref 33–37)
MCV RBC AUTO: 94.3 FL (ref 82–100)
PDW BLD-RTO: 13.2 % (ref 11.5–14.5)
PLATELET # BLD: 345 K/UL (ref 130–400)
POTASSIUM SERPL-SCNC: 4.6 MEQ/L (ref 3.5–5.1)
RBC # BLD: 5.11 M/UL (ref 4.2–5.4)
SODIUM BLD-SCNC: 144 MEQ/L (ref 132–144)
TOTAL PROTEIN: 7.5 G/DL (ref 6.4–8.1)
WBC # BLD: 9.2 K/UL (ref 4.8–10.8)

## 2018-07-17 PROCEDURE — 76705 ECHO EXAM OF ABDOMEN: CPT

## 2018-07-18 LAB
RPR: NORMAL
VITAMIN D 25-HYDROXY: 34.9 NG/ML (ref 30–100)

## 2018-07-19 LAB
AFP: 3.5 UG/L
HIV-1 AND HIV-2 ANTIBODIES: NEGATIVE

## 2018-07-20 LAB
C. TRACHOMATIS DNA ,URINE: NEGATIVE
N. GONORRHOEAE DNA, URINE: NEGATIVE

## 2018-07-25 ENCOUNTER — OFFICE VISIT (OUTPATIENT)
Dept: INFECTIOUS DISEASES | Age: 53
End: 2018-07-25
Payer: COMMERCIAL

## 2018-07-25 VITALS
HEART RATE: 98 BPM | BODY MASS INDEX: 28.54 KG/M2 | RESPIRATION RATE: 12 BRPM | SYSTOLIC BLOOD PRESSURE: 118 MMHG | HEIGHT: 64 IN | DIASTOLIC BLOOD PRESSURE: 88 MMHG | WEIGHT: 167.2 LBS | TEMPERATURE: 97.9 F

## 2018-07-25 DIAGNOSIS — R79.89 ELEVATED LFTS: Primary | ICD-10-CM

## 2018-07-25 DIAGNOSIS — K76.0 HEPATIC STEATOSIS: ICD-10-CM

## 2018-07-25 DIAGNOSIS — M25.50 POLYARTHRALGIA: ICD-10-CM

## 2018-07-25 PROCEDURE — 4004F PT TOBACCO SCREEN RCVD TLK: CPT | Performed by: INTERNAL MEDICINE

## 2018-07-25 PROCEDURE — 99214 OFFICE O/P EST MOD 30 MIN: CPT | Performed by: INTERNAL MEDICINE

## 2018-07-25 PROCEDURE — G8427 DOCREV CUR MEDS BY ELIG CLIN: HCPCS | Performed by: INTERNAL MEDICINE

## 2018-07-25 PROCEDURE — G8417 CALC BMI ABV UP PARAM F/U: HCPCS | Performed by: INTERNAL MEDICINE

## 2018-07-25 PROCEDURE — 3017F COLORECTAL CA SCREEN DOC REV: CPT | Performed by: INTERNAL MEDICINE

## 2018-07-26 DIAGNOSIS — K76.0 FATTY LIVER: ICD-10-CM

## 2018-07-26 DIAGNOSIS — M25.50 POLYARTHRALGIA: Primary | ICD-10-CM

## 2018-09-17 NOTE — PROGRESS NOTES
Jaimie Monday  1965  female  Medical Record Number: [id number]    Patient informed that I am an Infectious Disease physician and permission obtained from the patient to speak in front of any visitors prior to any discussion for HIPPA purposes. HPI:Patient is a referral for polyarthralgia. Patient started having pain in 2014. She had a strangulated hernia and had mesh which had to be replaced. Her joint pains started about that time. Her teeth started to break off and all of her teeth had to be pulled by January of the follow ing year. Apparently the mesh is too embedded and cannot be fully removed. She has scar tissues and thought she even had restless leg syndrome but her joints and muscles both started to ache bilaterally    She has no hx of STDS, HIV, Hepatitis, no animal exposure, no travel, she lives at home with her  and parents. She gets visits from her grandkids. She swims and goes to the gym. She lives in [place] and does not do any other jobs. She used to smoke 4 cigs a day. Never had any swollen joints, just pain    Subjectively, no other new complaints at this time. Review of Systems: All 14 review of systems were discussed with the patient and are negative other than as stated above      Past Medical History:   Diagnosis Date    Anxiety     GERD (gastroesophageal reflux disease)     History of cellulitis and abscess     Irritable bowel syndrome        Past Surgical History:   Procedure Laterality Date    HERNIA REPAIR  1992, 2012    HYSTERECTOMY  2009    OTHER SURGICAL HISTORY  02/11/15    DR Ugarte  EXCISION OF LT LOWER QUAD NONHEALING SKIN LESION    SMALL INTESTINE SURGERY  2012    bowel obstruction @ [facility] Dr. Edin Rodarte  [id number]    DR. MAGALLON       Social History     Social History    Marital status:      Spouse name: N/A    Number of children: N/A    Years of education: N/A

## 2018-09-26 PROBLEM — M47.817 LUMBOSACRAL SPONDYLOSIS WITHOUT MYELOPATHY: Status: ACTIVE | Noted: 2018-09-26

## 2019-07-05 ENCOUNTER — HOSPITAL ENCOUNTER (OUTPATIENT)
Dept: MRI IMAGING | Age: 54
Discharge: HOME | End: 2019-07-07
Payer: COMMERCIAL

## 2019-07-05 DIAGNOSIS — M54.16 LUMBAR RADICULOPATHY: ICD-10-CM

## 2019-08-06 ENCOUNTER — HOSPITAL ENCOUNTER (OUTPATIENT)
Dept: MRI IMAGING | Age: 54
Discharge: HOME | End: 2019-08-08
Payer: COMMERCIAL

## 2019-08-06 DIAGNOSIS — M47.817 LUMBOSACRAL SPONDYLOSIS WITHOUT MYELOPATHY: ICD-10-CM

## 2019-08-06 PROCEDURE — 72148 MRI LUMBAR SPINE W/O DYE: CPT

## 2020-03-12 ENCOUNTER — HOSPITAL ENCOUNTER (OUTPATIENT)
Dept: MRI IMAGING | Age: 55
Discharge: HOME | End: 2020-03-14
Payer: COMMERCIAL

## 2020-03-12 PROCEDURE — 73721 MRI JNT OF LWR EXTRE W/O DYE: CPT

## 2020-07-07 ENCOUNTER — TELEPHONE (OUTPATIENT)
Dept: UROLOGY | Age: 55
End: 2020-07-07

## 2020-07-07 NOTE — TELEPHONE ENCOUNTER
This patient called us in November of 2019 requesting an appointment with [facility]. She was told at that time she would need to get her records from [facility] as she has had 6-7 abdominal surgeries there and has a very extensive surgical history. We have never received any records from her. She will need to get these records for any future appointments with us.

## 2021-06-03 ENCOUNTER — HOSPITAL ENCOUNTER (OUTPATIENT)
Dept: PHYSICAL THERAPY | Age: 56
Setting detail: THERAPIES SERIES
Discharge: HOME | End: 2021-06-03
Payer: COMMERCIAL

## 2021-06-03 PROCEDURE — 97110 THERAPEUTIC EXERCISES: CPT

## 2021-06-03 PROCEDURE — 97162 PT EVAL MOD COMPLEX 30 MIN: CPT

## 2021-06-03 ASSESSMENT — PAIN DESCRIPTION - FREQUENCY: FREQUENCY: CONTINUOUS

## 2021-06-03 ASSESSMENT — PAIN SCALES - GENERAL: PAINLEVEL_OUTOF10: 5

## 2021-06-03 ASSESSMENT — PAIN DESCRIPTION - LOCATION: LOCATION: BACK;HIP

## 2021-06-03 ASSESSMENT — PAIN DESCRIPTION - ORIENTATION: ORIENTATION: MID;LOWER;LEFT

## 2021-06-03 ASSESSMENT — PAIN DESCRIPTION - DESCRIPTORS: DESCRIPTORS: THROBBING;ACHING;SHARP

## 2021-06-03 NOTE — PROGRESS NOTES
[address]  PHYSICAL THERAPY EVALUATION    Date: 6/3/2021  Patient Name: Aris Lozano Monday       MRN: [id number]   Account: [de-identified]   : 1965  (50 y.o.)   Gender: female   Referring Practitioner: JAYSHREE Plunkett                 Diagnosis: lumbosacral spondylosis, without myelopathy, hip pain, left  Treatment Diagnosis: decreased posture, decreased lumbar spine ROM, decreased LE, abdominal, and trunk extensor strength, decreased tolerance for prolonged laying/seated positions and decreased tolerance for prolonged standing and amb with evidence of antalgic pattern during gait  Additional Pertinent Hx: OA, chronic pain, fibromyalgia, 7 surgeries for mesh for hernias, hysterectomy          Past Medical History:  has a past medical history of Anxiety, GERD (gastroesophageal reflux disease), History of cellulitis and abscess, and Irritable bowel syndrome. Past Surgical History:   has a past surgical history that includes Hysterectomy (); Tubal ligation (); hernia repair (, ); Small intestine surgery (); other surgical history (02/11/15); and Upper gastrointestinal endoscopy (237400). 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   Pt reports attended PT for same issue approx 1 year ago. Pt with c/o pain in lumbar spine to low thoracic. Biofreeze helps. Pt unable to find a position of comfort sitting, standing, or laying. Takes narco 3xs/day and inbetween takes aleve or tylenol. Tried ice and heat with minimal relief.   Comments: has an order for L hip x-ray- pt has not completed    Objective:   Sensation  Overall Sensation Status: WFL    Ambulation 1  Surface: carpet  Device: No Device  Assistance: Independent  Quality of Gait: antalgic pattern L LE due to pain    Strength RLE  Strength RLE: Exception  R Hip Flexion: 4-/5  R Hip Extension: 3+/5  R Hip ABduction: 3+/5  R Knee Flexion: 4/5  R Knee Extension: 4/5  R Ankle Dorsiflexion: 4+/5  Strength LLE  Strength LLE: Exception  Comment: severe pain with manual muscle testing  L Hip Flexion: 3/5  L Hip Extension: 3/5  L Hip ABduction: 3/5  L Knee Flexion: 4-/5  L Knee Extension: 4-/5  L Ankle Dorsiflexion: 4/5     Strength Other  Other: unable to assess abdominal and trunk extension strength due to pt unable to assume tesing positions due to pain- anticipate 2/5 strength       AROM RLE (degrees)  RLE AROM: WFL     AROM LLE (degrees)  LLE AROM : WFL    Spine  Lumbar: AROM: flexion 25%, ext 10%, SB R/L 10% of normal ROM- mesh moving during AROM assessment and pt had to manually reposition the mesh- pain provocation all directions    Observation/Palpation  Posture: Fair (standing posture: dowagers hump, head forward, decreased thoracic kyphosis)  Observation: weight bears R LE during standing to avoid increased pain to L LE  Bed mobility  Comment: unable to lay prone    Additional Measures  Flexibility: hamstring flexibility 90/90 B: 40 eg  Special Tests: unable to assume tesing position for slump testing due to abdominal mesh    Exercises:   Exercises  Exercise 1: gentle TA isometrics due to mesh and h/o abdominal hernias 5 sec X 10  Exercise 2: aquatic*  Exercise 3: amb fwd/side/retro*  Exercise 4: standing B LE with gentle TA isometrics*  Exercise 5: KB push/pull*  Exercise 6: hamstring stretch*  Exercise 7: leaning against pool wall isometric trunk ext (gentle)*  Exercise 20: HEP: gentle TA isometric- handout provided     Modalities:  Modalities  Moist heat: lumbar spine*     Manual:  Manual therapy  Soft Tissue Mobalization: lumbar/ thoracic paraspinals*  *Indicates exercise,modality, or manual techniques to be initiated when appropriate    Assessment: Body structures, Functions, Activity limitations: Decreased functional mobility , Increased pain, Decreased posture, Decreased ROM, Decreased strength, Decreased endurance  Assessment: Pt is 50 y.o. female with history of chronic back pain which progressively worsened after 7 abdominal surgeries for hernias. 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 Pt exhibits impairments of decreased posture, decreased lumbar spine ROM, decreased LE, abdominal, and trunk extensor strength, decreased tolerance for prolonged laying/seated positions and decreased tolerance for prolonged standing and amb with evidence of antalgic pattern during gait. Pt beginning with aquatic txs for decreased pain and progressing to land as able. PT is recommended to address these impairments, progress strength and ROM, and provide pt with HEP for self mangement of pain for improved quality of life.   Prognosis: Good  Discharge Recommendations: Continue to assess pending progress        Decision Making: Medium Complexity  History: med- OA, chronic pain, fibromyalgia, 7 surgeries for mesh for hernias, hysterectomy  Exam: med- LEFS 43/80  Clinical Presentation: evolving- med        Plan  Frequency/Duration:  Plan  Times per week: 1-2  Plan weeks: 6-8  Current Treatment Recommendations: Strengthening, Neuromuscular Re-education, Home Exercise Program, ROM, Manual Therapy - Soft Tissue Mobilization, Safety Education & Training, Aquatics, Balance Training, Endurance Training, Patient/Caregiver Education & Training, Functional Mobility Training, Equipment Evaluation, Education, & procurement, Gait Training, Modalities, Stair training, Pain Management, Positioning         Patient Education  New Education Provided: PT Education: Goals;PT Role;Plan of Care;Home Exercise Program    POST-PAIN     Pain Rating (0-10 pain scale):  5 /10  Location and pain description same as pre-treatment unless indicated. Action: [] NA  [] Call Physician  [x] Perform HEP  [x] Meds as prescribed    Evaluation and patient rights have been reviewed and patient agrees with plan of care. Yes  [x]  No  []   Explain:       Chandra Fall Risk Assessment  Risk Factor Scale  Score   History of Falls [] Yes  [x] No 25  0 0   Secondary Diagnosis [x] Yes  [] No 15  0 15   Ambulatory Aid [] Furniture  [] Crutches/cane/walker  [x] None/bedrest/wheelchair/nurse 30  15  0 0   IV/Heparin Lock [] Yes  [x] No 20  0 0   Gait/Transferring [] Impaired  [x] Weak  [] Normal/bedrest/immobile 20  10  0 10   Mental Status [] Forgets limitations  [x] Oriented to own ability 15  0 0      Total: 25     Based on the Assessment score: check the appropriate box. []  No intervention needed   Low =   Score of 0-24  [x]  Use standard prevention interventions Moderate =  Score of 24-44   [x] Discuss fall prevention strategies   [x] Indicate moderate falls risk on eval  []  Use high risk prevention interventions High = Score of 45 and higher   [] Discuss fall prevention strategies   [] Provide supervision during treatment time    Goals  Short term goals  Time Frame for Short term goals: 2 wks  Short term goal 1: Pt will demonstrate improved trunk extensor, abdominal, and B LE strength >/= 4- to 4/5 for carryover to decreased pain with prolonged amb and prolonged standing >15 min.   Long term goals  Time Frame for Long term goals : 6-8 wks  Long term goal 1: Pt will demonstrate indep with HEP 100% of the time for self management of low back pain and L LE pain. Long term goal 2: Pt will demonstrate improved score on LEFS >/= 60/80 indicating minimal distability for improved pt quality of life. Long term goal 3: The pt will report decreased pain </=2/10 at worst in order to increase ease with caring for grandkids and parents.     PT Individual Minutes  Time In: 7437  Time Out: 1110  Minutes: 48  Timed Code Treatment Minutes: 8 Minutes  Procedure Minutes: eval 40 min     Timed Activity Minutes Units   Ther Ex 8 1     Electronically signed by Ainsley Wolf PT on 6/3/21 at 12:11 PM EDT

## 2021-06-03 NOTE — PROGRESS NOTES
[address]     Ph: [phone]  Fax: [phone]    [] Certification  [] Recertification [x]  Plan of Care  [] Progress Note [] Discharge      To:  IVÁN Pulido-CNP      From:  Hector Shepherd, PT  Patient: Gonzalez Marte Monday     : 1965  Diagnosis: lumbosacral spondylosis, without myelopathy, hip pain, left     Date: 6/3/2021  Treatment Diagnosis: decreased posture, decreased lumbar spine ROM, decreased LE, abdominal, and trunk extensor strength, decreased tolerance for prolonged laying/seated positions and decreased tolerance for prolonged standing and amb with evidence of antalgic pattern during gait       Progress Report Period from:  6/3/2021  to 6/3/2021    Total # of Visits to Date: 1   No Show: 0    Canceled Appointment: 0     OBJECTIVE:   Short Term Goals - Time Frame for Short term goals: 2 wks    Goals Current/Discharge status  Met   Short term goal 1: Pt will demonstrate improved trunk extensor, abdominal, and B LE strength >/= 4- to 4/5 for carryover to decreased pain with prolonged amb and prolonged standing >15 min.   Strength RLE  Strength RLE: Exception  R Hip Flexion: 4-/5  R Hip Extension: 3+/5  R Hip ABduction: 3+/5  R Knee Flexion: 4/5  R Knee Extension: 4/5  R Ankle Dorsiflexion: 4+/5  Strength LLE  Strength LLE: Exception  Comment: severe pain with manual muscle testing  L Hip Flexion: 3/5  L Hip Extension: 3/5  L Hip ABduction: 3/5  L Knee Flexion: 4-/5  L Knee Extension: 4-/5  L Ankle Dorsiflexion: 4/5        Strength Other  Other: unable to assess abdominal and trunk extension strength due to pt unable to assume tesing positions due to pain- anticipate 2/5 strength   [] yes  [x] no     Long Term Goals - Time Frame for Long term goals : 6-8 wks  Goals Current/ Discharge status Met   Long term goal 1: Pt will demonstrate indep with % of the time for self management of low back pain and L LE pain. HEP initiated: gentle TA isometric- handout provided [] yes  [x] no   Long term goal 2: Pt will demonstrate improved score on LEFS >/= 60/80 indicating minimal distability for improved pt quality of life. LEFS 43/80   [] yes  [x] no   Long term goal 3: The pt will report decreased pain </=2/10 at worst in order to increase ease with caring for grandkids and parents. Pain Location: Back, Hip    Pain Level: 5 (3-9/10 pain range)    Pain Descriptors: Throbbing, Aching, Sharp [] yes  [x] no       Body structures, Functions, Activity limitations: Decreased functional mobility , Increased pain, Decreased posture, Decreased ROM, Decreased strength, Decreased endurance  Assessment: Pt is 50 y.o. female with history of chronic back pain which progressively worsened after 7 abdominal surgeries for hernias. 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 Pt exhibits impairments of decreased posture, decreased lumbar spine ROM, decreased LE, abdominal, and trunk extensor strength, decreased tolerance for prolonged laying/seated positions and decreased tolerance for prolonged standing and amb with evidence of antalgic pattern during gait. Pt beginning with aquatic txs for decreased pain and progressing to land as able. PT is recommended to address these impairments, progress strength and ROM, and provide pt with HEP for self mangement of pain for improved quality of life.   Prognosis: Good  Discharge Recommendations: Continue to assess pending progress      PT Education: Goals;PT Role;Plan of Care;Home Exercise Program    PLAN: [x] Evaluate and Treat  Frequency/Duration:  Plan  Times per week: 1-2  Plan weeks: 6-8  Current Treatment Recommendations: Strengthening, Neuromuscular Re-education, Home Exercise Program, ROM, Manual Therapy - Soft Tissue Mobilization, Safety Education & Training, Aquatics, Balance Training, Endurance Training, Patient/Caregiver Education & Training, Functional Mobility Training, Equipment Evaluation, Education, & procurement, Gait Training, Modalities, Stair training, Pain Management, Positioning     Precautions:      falls                      Patient Status:[x] Continue/ Initiate plan of Care    [] Discharge PT. Recommend pt continue with HEP. [] Additional visits requested, Please re-certify for additional visits:          Signature: Electronically signed by Krysta Gavin PT on 6/3/21 at 12:14 PM EDT      If you have any questions or concerns, please don't hesitate to call. Thank you for your referral.    I have reviewed this plan of care and certify a need for medically necessary rehabilitation services.     Physician Signature:__________________________________________________________  Date:  Please sign and return

## 2021-06-07 ENCOUNTER — OFFICE VISIT (OUTPATIENT)
Dept: PAIN MANAGEMENT | Age: 56
End: 2021-06-07
Payer: COMMERCIAL

## 2021-06-07 VITALS
SYSTOLIC BLOOD PRESSURE: 128 MMHG | HEIGHT: 64 IN | TEMPERATURE: 97.1 F | DIASTOLIC BLOOD PRESSURE: 82 MMHG | WEIGHT: 162 LBS | BODY MASS INDEX: 27.66 KG/M2

## 2021-06-07 DIAGNOSIS — M79.604 PAIN IN BOTH LOWER EXTREMITIES: ICD-10-CM

## 2021-06-07 DIAGNOSIS — F11.90 CHRONIC, CONTINUOUS USE OF OPIOIDS: ICD-10-CM

## 2021-06-07 DIAGNOSIS — M47.817 LUMBOSACRAL SPONDYLOSIS WITHOUT MYELOPATHY: ICD-10-CM

## 2021-06-07 DIAGNOSIS — G89.29 CHRONIC PAIN OF LEFT KNEE: Primary | ICD-10-CM

## 2021-06-07 DIAGNOSIS — G89.29 ABDOMINAL PAIN, CHRONIC, RIGHT UPPER QUADRANT: ICD-10-CM

## 2021-06-07 DIAGNOSIS — M79.605 PAIN IN BOTH LOWER EXTREMITIES: ICD-10-CM

## 2021-06-07 DIAGNOSIS — M25.562 CHRONIC PAIN OF LEFT KNEE: Primary | ICD-10-CM

## 2021-06-07 DIAGNOSIS — R10.11 ABDOMINAL PAIN, CHRONIC, RIGHT UPPER QUADRANT: ICD-10-CM

## 2021-06-07 PROCEDURE — 3017F COLORECTAL CA SCREEN DOC REV: CPT | Performed by: NURSE PRACTITIONER

## 2021-06-07 PROCEDURE — 4004F PT TOBACCO SCREEN RCVD TLK: CPT | Performed by: NURSE PRACTITIONER

## 2021-06-07 PROCEDURE — G8427 DOCREV CUR MEDS BY ELIG CLIN: HCPCS | Performed by: NURSE PRACTITIONER

## 2021-06-07 PROCEDURE — 99214 OFFICE O/P EST MOD 30 MIN: CPT | Performed by: NURSE PRACTITIONER

## 2021-06-07 PROCEDURE — G8419 CALC BMI OUT NRM PARAM NOF/U: HCPCS | Performed by: NURSE PRACTITIONER

## 2021-06-07 RX ORDER — HYDROCODONE BITARTRATE AND ACETAMINOPHEN 5; 325 MG/1; MG/1
1 TABLET ORAL 3 TIMES DAILY PRN
Qty: 90 TABLET | Refills: 0 | Status: SHIPPED | OUTPATIENT
Start: 2021-06-08 | End: 2021-07-02 | Stop reason: SDUPTHER

## 2021-06-07 ASSESSMENT — ENCOUNTER SYMPTOMS
COUGH: 0
TROUBLE SWALLOWING: 0
BACK PAIN: 1
GASTROINTESTINAL NEGATIVE: 1
EYES NEGATIVE: 1
SHORTNESS OF BREATH: 0

## 2021-06-07 NOTE — PROGRESS NOTES
Jaimie RAGSDALE Monday  (1965)    6/7/2021    Subjective:     Juan Daniel Shearer Monday is 50 y.o. female who complains today of:    Chief Complaint   Patient presents with    Back Pain    Leg Pain    Abdominal Pain    Knee Pain     left         Allergies:  Augmentin [amoxicillin-pot clavulanate], Dye [iodides], Macrobid [nitrofurantoin monohydrate macrocrystals], and Flagyl [metronidazole]    Past Medical History:   Diagnosis Date    Anxiety     GERD (gastroesophageal reflux disease)     History of cellulitis and abscess     Irritable bowel syndrome      Past Surgical History:   Procedure Laterality Date   [address], 2012    HYSTERECTOMY  2009    OTHER SURGICAL HISTORY  02/11/15    DR Ugarte  EXCISION OF LT LOWER QUAD NONHEALING SKIN LESION    SMALL INTESTINE SURGERY  2012    bowel obstruction @ [facility] Dr. Silvestre Leiva  [id number]    DR. Briana Smith     Family History   Problem Relation Age of Onset    Diabetes Mother     Heart Disease Mother         stints    Diabetes Sister     High Blood Pressure Sister      Social History     Socioeconomic History    Marital status:      Spouse name: Not on file    Number of children: Not on file    Years of education: Not on file    Highest education level: Not on file   Occupational History    Not on file   Tobacco Use    Smoking status: Current Some Day Smoker     Packs/day: 1.00     Years: 12.00     Pack years: 12.00     Types: Cigarettes    Smokeless tobacco: Never Used    Tobacco comment: 4/day    Substance and Sexual Activity    Alcohol use: No    Drug use: No    Sexual activity: Not on file   Other Topics Concern    Not on file   Social History Narrative    Not on file     Social Determinants of Health     Financial Resource Strain:     Difficulty of Paying Living Expenses:    Food Insecurity:     Worried About Running Out of Food in the Last Year:     Denisa of Food in the Last Year:    Transportation Needs:     Lack of Transportation (Medical):  Lack of Transportation (Non-Medical):    Physical Activity:     Days of Exercise per Week:     Minutes of Exercise per Session:    Stress:     Feeling of Stress :    Social Connections:     Frequency of Communication with Friends and Family:     Frequency of Social Gatherings with Friends and Family:     Attends [employer] Services:     Active Member of Clubs or Organizations:     Attends Club or Organization Meetings:     Marital Status:    Intimate Partner Violence:     Fear of Current or Ex-Partner:     Emotionally Abused:     Physically Abused:     Sexually Abused:        Current Outpatient Medications on File Prior to Visit   Medication Sig Dispense Refill    lidocaine (LMX) 4 % cream Apply a half dollar sized amount to intact skin topically up to twice daily as needed for pain 1 Tube 1    vitamin D (ERGOCALCIFEROL) 67688 units CAPS capsule Take 1 capsule by mouth once a week 12 capsule 1    magnesium oxide (MAG-OX) 400 (240 Mg) MG tablet Take 1 tablet by mouth daily 30 tablet 0    dicyclomine (BENTYL) 10 MG capsule Take 1 capsule by mouth every 6 hours as needed (cramps) (Patient not taking: Reported on 6/7/2021) 20 capsule 0    DULoxetine (CYMBALTA) 30 MG extended release capsule Take 1 capsule by mouth daily (Patient not taking: Reported on 6/7/2021) 30 capsule 3    esomeprazole (NEXIUM) 40 MG delayed release capsule Take 1 capsule by mouth daily (Patient not taking: Reported on 6/7/2021) 60 capsule 5     No current facility-administered medications on file prior to visit. Pt presents today for a f/u of her pain. Pt last saw this NP for her chronic low back pain, chronic abdominal pain and multi-joint pain. PT ordered and had evaluation for Lt hip and low back pain and says she is \"going to go\" and says they will do aquatic therapy first.  XR of Lt hip ordered last OV but she forgot about this.   She is getting a lot of Lt leg and knee pain. She says if she squeezes her Lt knee it feels better. Hasn't gotten Covid vaccine. . Advised to f/u with GI/PCP for her stomach pain and gallbladder and says this continues Hx of multiple surgeries on her abdomen. She continues magnesium, but gets OTC. She saw Dr. Becca Otto for left hip bursitis and knee arthritis. EMG UE & LE normal.  She says the numbness in legs comes and goes, but says this is more pain - can't lay on Lt. She is scared about the injection.     She uses Norco 5mg TID PRN pain. She will alternate with motrin and tylenol as well. .  Pt feels pain level with her medication is 6-7/10, and 9/10 without medication. Pt feels that laying, prolonged sitting makes the pain worse, and medication, Biofreeze makes the pain better. Pt feels her medication helps   her function and improve her quality of life, specifically says allows to [facility]. Pt denies radiating numbness and tingling but reports radiating pain. Denies recent falls, injuries or trauma. Pt denies new weakness. Pt reports PT has been started. Review of Systems   Constitutional: Negative. HENT: Negative. Negative for trouble swallowing. Eyes: Negative. Respiratory: Negative for cough and shortness of breath. Cardiovascular: Negative for chest pain. Gastrointestinal: Negative. Endocrine: Negative. Genitourinary: Negative. Musculoskeletal: Positive for back pain. Negative for neck pain. Skin: Negative. Neurological: Negative for dizziness, weakness and headaches. Hematological: Negative. Psychiatric/Behavioral: Negative. Reviewed Dr. Georgi Campbell notes and reports from 12/3/2020:  XR C-spine 06/03/2020:  Degenerative disc disease, facet arthropathy, instability C3 through C6, disc space heights maintained, no fracture. XR LS-spine 06/03/2020:  Degenerative disc disease, facet arthropathy, no fracture.   XR L-knee 06/03/2020:  No effusion or fracture, standing films with preserved joint spaces. MRI R-hip 03/12/2020:  Minimal degenerative changes right hip, no fracture.    EMG B LE 10/29/20: This study is normal. There is no current electrodiagnostic evidence for an active lumbosacral motor radiculopathy or generalized large fiber sensorimotor peripheral polyneuropathy.   EMG B UE 9/16/20: This study is normal. There is no current electrodiagnostic evidence for a bilateral median mononeuropathy, active cervical motor radiculopathy, or generalized large fiber sensorimotor peripheral polyneuropathy. MRI lumbar spine 8/6/19: No fracture.  T12-L1, L1 L2, L2 L3, L3 L4, L4-L5 normal canal and foramen.  L5-S1 mild canal stenosis, normal foramen. XR R Hip 12/26/18: hip joint spaces maintained. No fracture. EMG B LE 1/10/19: This study is limited, but normal. Although limited, there is no clear electrodiagnostic evidence for a generalized large fiber sensorimotor peripheral polyneuropathy or active lumbosacral motor radiculopathy. XR LS Spine 6/1/18: degenerative disc disease, lumbar facet arthropathy, no fracture. CT Abd Pelvis 4/14/18: stable right lower lung nodule, unchanged May 16. Hysterectomy. Remote RLQ and Left anterior abd wall surgical procedures. CT Abd Pelvis 5/23/16: lung nodules. Hepatic steatosis. Gallbladder sludge. Right kidney calcification. Non obstructing right renal calculi      +Anxiety not on any medicine  -Gabapentin worsened anxiety. Cymbalta mood changes/anger. Lyrica \"high feeling. \"  UDS 05/15/3547:  Free of illicits, consistent with Norco.      UDS 4/94/48: free of illicits, consistent with Norco.  Opioid risk tool score 0, low risk  Objective:     Vitals:  /82   Temp 97.1 °F (36.2 °C)   Ht 5' 4\" (1.626 m)   Wt 162 lb (73.5 kg)   LMP 01/01/2009   BMI 27.81 kg/m² Pain Score:   7      Physical Exam  Vitals and nursing note reviewed.        This is a pleasant female who answers questions appropriately and follows commands.  Pt is alert and oriented x 3.  Recent and remote memory is intact.  Mood and affect, judgement and insight are normal.  No signs of distress, no dyspnea or SOB noted. HEENT: PERRL.  Neck is supple, trachea midline.  No lymphadenopathy noted.  Abdomin soft and round.  Tender with palpation in all four quadrants - greatest in RUQ. Surgical scars noted. Decreased ROM with flexion and extension of low back. Tender with palpation to lumbar spine with palpation on left with positive provacative maneuvers noted. Negative SLR. Decreased ROM Lt hip with discomfort. Tightness in both hamstrings noted. Lt knee with decreased ROM. Tenderness noted with palpation greater over medial joint line. Slight instability with testing increasing pain. Mild swelling noted with arthritic deformity noted. Gait antalgic. Pt is able to briefly heel walk and toe walk. Balance and coordination normal.  Strength is functional for ambulation. Cranial nerves II-XII are intact. Assessment:      Diagnosis Orders   1. Chronic pain of left knee  HYDROcodone-acetaminophen (NORCO) 5-325 MG per tablet    Ambulatory referral to Physical Therapy    XR KNEE LEFT (3 VIEWS)    XR KNEE BILATERAL STANDING   2. Abdominal pain, chronic, right upper quadrant  HYDROcodone-acetaminophen (NORCO) 5-325 MG per tablet   3. Lumbosacral spondylosis without myelopathy  HYDROcodone-acetaminophen (NORCO) 5-325 MG per tablet   4. Pain in both lower extremities  HYDROcodone-acetaminophen (NORCO) 5-325 MG per tablet    Ambulatory referral to Physical Therapy   5. Chronic, continuous use of opioids  HYDROcodone-acetaminophen (NORCO) 5-325 MG per tablet       Plan:     Periodic Controlled Substance Monitoring: Possible medication side effects, risk of tolerance/dependence & alternative treatments discussed., No signs of potential drug abuse or diversion identified. , Assessed functional status., Obtaining appropriate analgesic effect of treatment.  (Christi Shoemaker, APRN - CNP)    Orders Placed This Encounter   Medications    HYDROcodone-acetaminophen (NORCO) 5-325 MG per tablet     Sig: Take 1 tablet by mouth 3 times daily as needed for Pain for up to 30 days. Dispense:  90 tablet     Refill:  0     Reduce doses taken as pain becomes manageable       Orders Placed This Encounter   Procedures    XR KNEE LEFT (3 VIEWS)     Standing Status:   Future     Standing Expiration Date:   9/5/2021     Order Specific Question:   Reason for exam:     Answer:   worsening lt knee pain    XR KNEE BILATERAL STANDING     Standing Status:   Future     Standing Expiration Date:   9/5/2021     Order Specific Question:   Reason for exam:     Answer:   worsening lt knee pain    Ambulatory referral to Physical Therapy     Referral Priority:   Routine     Referral Type:   Physical Medicine     Requested Specialty:   Physical Therapy     Number of Visits Requested:   1     Discussed options with the patient today. Anatomic model pathology was shown and reviewed with pt. Will add PT for Lt knee d/t pain and some instability. Will order updated XR of LT knee as well to evaluate further. She will beging PT for low back and Lt hip and will get her Lt hip XR done. Will continue her current dose of Norco. All questions were answered. Discussed home exercise program and  smoking cessation. Relevant imaging and pain generators reviewed. Pt verbalized understanding and agrees with above plan. Pt has chronic pain. Utox reviewed and appropriate from May 2021. ORT score 0 - low risk. Narcan Rx sent in recent past.    Will continue medications for chronic pain that has been previously directed as they do help pt function with ADL and improve quality of life. Pt is aware goal is to use the least amount of medication possible to allow pt to function and help with quality of life as discussed in detail. Discussed ideal to keep MME below 50 which it is. Discussed proper disposal of narcotic medication.  Advised to

## 2021-06-14 ENCOUNTER — HOSPITAL ENCOUNTER (OUTPATIENT)
Dept: PHYSICAL THERAPY | Age: 56
Setting detail: THERAPIES SERIES
Discharge: HOME | End: 2021-06-14
Payer: COMMERCIAL

## 2021-06-14 PROCEDURE — 97110 THERAPEUTIC EXERCISES: CPT

## 2021-06-14 ASSESSMENT — PAIN DESCRIPTION - ORIENTATION: ORIENTATION: LEFT

## 2021-06-14 ASSESSMENT — PAIN SCALES - GENERAL: PAINLEVEL_OUTOF10: 6

## 2021-06-14 ASSESSMENT — PAIN DESCRIPTION - FREQUENCY: FREQUENCY: CONTINUOUS

## 2021-06-14 ASSESSMENT — PAIN DESCRIPTION - DESCRIPTORS: DESCRIPTORS: ACHING;THROBBING

## 2021-06-14 ASSESSMENT — PAIN DESCRIPTION - LOCATION: LOCATION: ABDOMEN;HIP

## 2021-06-21 ENCOUNTER — HOSPITAL ENCOUNTER (OUTPATIENT)
Dept: PHYSICAL THERAPY | Age: 56
Setting detail: THERAPIES SERIES
Discharge: HOME | End: 2021-06-21
Payer: COMMERCIAL

## 2021-06-21 NOTE — PROGRESS NOTES
Therapy                            Cancellation/No-show Note      Date:  2021  Patient Name:  Eneida Jamison Monday  :  1965   MRN:  [id number]  Referring Practitioner: JAYSHREE Voss  Diagnosis: lumbosacral spondylosis, without myelopathy, hip pain, left    Visit Information:  PT Visit Information  PT Insurance Information: [employer]  Total # of Visits Approved: 60  Total # of Visits to Date: 2  No Show: 0  Canceled Appointment: 1  Progress Note Counter:  (cx on 21)    For today's appointment patient:  [x]  Cancelled  []  Rescheduled appointment  []  No-show   []  Called pt to remind of next appointment     Reason given by patient:  [x]  Patient ill  []  Conflicting appointment  []  No transportation    []  Conflict with work  []  No reason given  []  Other:      [x] Pt has future appointments scheduled, no follow up needed  [] Pt requests to be on hold.     Reason:   If > 2 weeks please discuss with therapist.  [] Therapist to call pt for follow up     Comments:       Signature: Electronically signed by Ivy Price PTA on 21 at 9:27 AM EDT

## 2021-06-28 ENCOUNTER — HOSPITAL ENCOUNTER (OUTPATIENT)
Dept: PHYSICAL THERAPY | Age: 56
Setting detail: THERAPIES SERIES
Discharge: HOME | End: 2021-06-28
Payer: COMMERCIAL

## 2021-06-28 PROCEDURE — 97110 THERAPEUTIC EXERCISES: CPT

## 2021-06-28 ASSESSMENT — PAIN DESCRIPTION - ORIENTATION: ORIENTATION: LOWER;LEFT

## 2021-06-28 ASSESSMENT — PAIN DESCRIPTION - DESCRIPTORS: DESCRIPTORS: ACHING;PRESSURE

## 2021-06-28 ASSESSMENT — PAIN DESCRIPTION - LOCATION: LOCATION: BACK;KNEE

## 2021-06-28 ASSESSMENT — PAIN SCALES - GENERAL: PAINLEVEL_OUTOF10: 5

## 2021-06-29 ENCOUNTER — HOSPITAL ENCOUNTER (OUTPATIENT)
Dept: GENERAL RADIOLOGY | Age: 56
Discharge: HOME | End: 2021-07-01
Payer: COMMERCIAL

## 2021-06-29 ENCOUNTER — TELEPHONE (OUTPATIENT)
Dept: PAIN MANAGEMENT | Age: 56
End: 2021-06-29

## 2021-06-29 DIAGNOSIS — G89.29 CHRONIC PAIN OF LEFT KNEE: ICD-10-CM

## 2021-06-29 DIAGNOSIS — M25.552 HIP PAIN, LEFT: ICD-10-CM

## 2021-06-29 DIAGNOSIS — M25.562 CHRONIC PAIN OF LEFT KNEE: ICD-10-CM

## 2021-06-29 PROCEDURE — 73502 X-RAY EXAM HIP UNI 2-3 VIEWS: CPT

## 2021-06-29 PROCEDURE — 73565 X-RAY EXAM OF KNEES: CPT

## 2021-06-29 PROCEDURE — 73562 X-RAY EXAM OF KNEE 3: CPT

## 2021-06-29 NOTE — TELEPHONE ENCOUNTER
----- Message from [address], APRN - CNP sent at 6/29/2021  2:30 PM EDT -----  Please tell pt XR report of Lt knee was normal, and Lt hip XR report shows mild to moderate arthritis changes. Will discuss further with pt at f/u.

## 2021-07-02 ENCOUNTER — OFFICE VISIT (OUTPATIENT)
Dept: PAIN MANAGEMENT | Age: 56
End: 2021-07-02
Payer: COMMERCIAL

## 2021-07-02 VITALS
BODY MASS INDEX: 29.59 KG/M2 | TEMPERATURE: 97.1 F | SYSTOLIC BLOOD PRESSURE: 128 MMHG | HEIGHT: 63 IN | WEIGHT: 167 LBS | DIASTOLIC BLOOD PRESSURE: 82 MMHG

## 2021-07-02 DIAGNOSIS — R10.11 ABDOMINAL PAIN, CHRONIC, RIGHT UPPER QUADRANT: ICD-10-CM

## 2021-07-02 DIAGNOSIS — F11.90 CHRONIC, CONTINUOUS USE OF OPIOIDS: ICD-10-CM

## 2021-07-02 DIAGNOSIS — M47.817 LUMBOSACRAL SPONDYLOSIS WITHOUT MYELOPATHY: ICD-10-CM

## 2021-07-02 DIAGNOSIS — M79.604 PAIN IN BOTH LOWER EXTREMITIES: ICD-10-CM

## 2021-07-02 DIAGNOSIS — Z51.81 ENCOUNTER FOR MONITORING OPIOID MAINTENANCE THERAPY: ICD-10-CM

## 2021-07-02 DIAGNOSIS — Z79.891 ENCOUNTER FOR MONITORING OPIOID MAINTENANCE THERAPY: ICD-10-CM

## 2021-07-02 DIAGNOSIS — G89.29 ABDOMINAL PAIN, CHRONIC, RIGHT UPPER QUADRANT: ICD-10-CM

## 2021-07-02 DIAGNOSIS — M25.562 CHRONIC PAIN OF LEFT KNEE: Primary | ICD-10-CM

## 2021-07-02 DIAGNOSIS — G89.29 CHRONIC PAIN OF LEFT KNEE: Primary | ICD-10-CM

## 2021-07-02 DIAGNOSIS — M79.605 PAIN IN BOTH LOWER EXTREMITIES: ICD-10-CM

## 2021-07-02 PROCEDURE — 99213 OFFICE O/P EST LOW 20 MIN: CPT | Performed by: PHYSICAL MEDICINE & REHABILITATION

## 2021-07-02 PROCEDURE — G8419 CALC BMI OUT NRM PARAM NOF/U: HCPCS | Performed by: PHYSICAL MEDICINE & REHABILITATION

## 2021-07-02 PROCEDURE — 4004F PT TOBACCO SCREEN RCVD TLK: CPT | Performed by: PHYSICAL MEDICINE & REHABILITATION

## 2021-07-02 PROCEDURE — G8427 DOCREV CUR MEDS BY ELIG CLIN: HCPCS | Performed by: PHYSICAL MEDICINE & REHABILITATION

## 2021-07-02 PROCEDURE — 3017F COLORECTAL CA SCREEN DOC REV: CPT | Performed by: PHYSICAL MEDICINE & REHABILITATION

## 2021-07-02 RX ORDER — HYDROCODONE BITARTRATE AND ACETAMINOPHEN 5; 325 MG/1; MG/1
1 TABLET ORAL 3 TIMES DAILY PRN
Qty: 90 TABLET | Refills: 0 | Status: SHIPPED | OUTPATIENT
Start: 2021-07-08 | End: 2021-08-03 | Stop reason: SDUPTHER

## 2021-07-02 ASSESSMENT — ENCOUNTER SYMPTOMS
CONSTIPATION: 0
BACK PAIN: 1
SHORTNESS OF BREATH: 0
NAUSEA: 0
DIARRHEA: 0

## 2021-07-02 NOTE — PROGRESS NOTES
Jaimie RAGSDALE Monday  (1965)    7/2/2021    Subjective:     Sharon Shipman Monday is 50 y.o. female who complains today of:    Chief Complaint   Patient presents with    Back Pain    Leg Pain     Left     Last seen 6/7/21, called by me 12/3/20: she has her dental implants, hard to eat with her new teeth. No other tests, therapy or updates from other physicians, no emergency room visits. Norco 5/325 mg TID helps with remaining functional with chores, personal hygiene, remaining compliant with home exercise program, maintaining her quality of life, and performing activities of daily living. She obtains greater than 50% pain relief without any significant side effects. She is clear to avoid driving or operating heavy machinery or to perform any activities where she may harm herself or others while on pain medications.      Mid back and flank pain is a 7/10. The pain can get up to an 8/10. The pain is worse with caring for her grandchildren and activity. Her pain is better with rest and pain medicine. The pain has been a constant ache since 2014. There are no other associated symptoms or contextual factors. She denies any classic radicular symptoms, new weakness, saddle anesthesia, bowel or bladder dysfunction, or falls.     Abdomen pain is a 7/10. Her pain can get up to an 8/10. The pain has been a constant ache since undergoing abdominal surgery mesh surgical placement after incarcerated spigelian hernia repair on July 23, 2012 by Dr. Santoro. The pain is better with pain medicine as well as eating certain foods. Pain is located mostly on the right side rather than left side. No weight loss. BUN/creatinine okay.       Left knee pain is a 6/10. Gets up to 8/10. Pain is worse with activity and bending. Pain is better with rest and pain medicine. Is been a constant ache for over 1 year. Right knee is fine. There are no other associated symptoms or contextual factors.  She denies any classic radicular symptoms, new weakness, saddle anesthesia, bowel or bladder dysfunction, or falls. Foot pain resolved  Dental pain 0/10 resolved  Right hand numbness/tingling resolved      Allergies:  Augmentin [amoxicillin-pot clavulanate], Dye [iodides], Macrobid [nitrofurantoin monohydrate macrocrystals], and Flagyl [metronidazole]    Past Medical History:   Diagnosis Date    Anxiety     GERD (gastroesophageal reflux disease)     History of cellulitis and abscess     Irritable bowel syndrome      Past Surgical History:   Procedure Laterality Date   Candy Dixonel 89, 2012    HYSTERECTOMY  2009    OTHER SURGICAL HISTORY  02/11/15    DR Ugarte  EXCISION OF LT LOWER QUAD NONHEALING SKIN LESION    SMALL INTESTINE SURGERY  2012    bowel obstruction @ [address] [facility] [address] [place] Mcqueen  [id number]    DR. Marisabel Morales     Family History   Problem Relation Age of Onset    Diabetes Mother     Heart Disease Mother         stints    Diabetes Sister     High Blood Pressure Sister      Social History     Socioeconomic History    Marital status:      Spouse name: Not on file    Number of children: Not on file    Years of education: Not on file    Highest education level: Not on file   Occupational History    Not on file   Tobacco Use    Smoking status: Current Some Day Smoker     Packs/day: 0.25     Years: 15.00     Pack years: 3.75     Types: Cigarettes    Smokeless tobacco: Never Used    Tobacco comment: 4/day    Substance and Sexual Activity    Alcohol use: No    Drug use: No    Sexual activity: Not on file   Other Topics Concern    Not on file   Social History Narrative    Not on file     Social Determinants of Health     Financial Resource Strain:     Difficulty of Paying Living Expenses:    Food Insecurity:     Worried About Running Out of Food in the Last Year:     Ran Out of Food in the Last Year:    Transportation Needs:     Lack of Transportation (Medical):  Lack of Transportation (Non-Medical):    Physical Activity:     Days of Exercise per Week:     Minutes of Exercise per Session:    Stress:     Feeling of Stress :    Social Connections:     Frequency of Communication with Friends and Family:     Frequency of Social Gatherings with Friends and Family:     Attends Yazdanism Services:     Active Member of Clubs or Organizations:     Attends Club or Organization Meetings:     Marital Status:    Intimate Partner Violence:     Fear of Current or Ex-Partner:     Emotionally Abused:     Physically Abused:     Sexually Abused:        Current Outpatient Medications on File Prior to Visit   Medication Sig Dispense Refill    magnesium oxide (MAG-OX) 400 (240 Mg) MG tablet Take 1 tablet by mouth daily 30 tablet 0    dicyclomine (BENTYL) 10 MG capsule Take 1 capsule by mouth every 6 hours as needed (cramps) (Patient not taking: Reported on 6/7/2021) 20 capsule 0    lidocaine (LMX) 4 % cream Apply a half dollar sized amount to intact skin topically up to twice daily as needed for pain 1 Tube 1    DULoxetine (CYMBALTA) 30 MG extended release capsule Take 1 capsule by mouth daily (Patient not taking: Reported on 6/7/2021) 30 capsule 3    vitamin D (ERGOCALCIFEROL) 60402 units CAPS capsule Take 1 capsule by mouth once a week 12 capsule 1    esomeprazole (NEXIUM) 40 MG delayed release capsule Take 1 capsule by mouth daily (Patient not taking: Reported on 6/7/2021) 60 capsule 5     No current facility-administered medications on file prior to visit. She has not tried physical therapy. Date of lastof last PT treatment: none    Back Pain  Associated symptoms include leg pain. Pertinent negatives include no fever or headaches. Leg Pain         Review of Systems   Constitutional: Negative for fever. HENT: Negative for hearing loss. Respiratory: Negative for shortness of breath. Gastrointestinal: Negative for constipation, diarrhea and nausea. Genitourinary: Negative for difficulty urinating. Musculoskeletal: Positive for back pain and neck pain. Skin: Negative for rash. Neurological: Negative for headaches. Hematological: Does not bruise/bleed easily. Psychiatric/Behavioral: Negative for sleep disturbance. Objective:     Vitals:  /82   Temp 97.1 °F (36.2 °C)   Ht 5' 3\" (1.6 m)   Wt 167 lb (75.8 kg)   LMP 01/01/2009   BMI 29.58 kg/m² Pain Score:   7      Exam performed under coronavirus precautions  General: No acute distress  Eyes: No scleral icterus appreciated bilaterally  ENMT: Moist mucous membranes  Neck: Symmetric without any overt masses or lesions  Respiratory: Respirations are non-labored  Skin: Visualized skin is intact  Psych: Pleasant and cooperative with history and exam.  Neurologic: Gait antalgic, no assistive devices for ambulation. Pt is alert and appropriately interactive. No signs of excessive sedation. Responds promptly and appropriately to questions asked. No aberrant behaviors appreciated. Paresthesias right L1 and L2 distribution. Tender right GT hip bursa  Some right anterior thigh pain  Tender left knee anterior knee       Otherwise sensation grossly intact in both legs. Reflexes and strength functional for ambulation, no abnormal reflexes appreciated on exam today  Strength greater than 3/5 bilateral legs  Straight leg raise negative bilaterally.            XR L hip 6/29/2021: Other moderate degenerative changes bilaterally symmetric, joint space is preserved. No fracture. XR L knee 6/29/2021: Joint spaces preserved. XR C-spine 6/3/2020: Displaced heights maintained, no fracture, facet arthropathy, instability C3-C6. XR L-spine 6/3/2020: Degenerative disc disease, facet arthropathy, no fracture. XR L knee 6/3/2020 standing films demonstrate preservation of joint spaces, no fracture  MRI R hip 3/12/2020: Small osteophytes, degenerative changes within the labrum. Left hip unremarkable. Normal SI joints. Minimal degenerative changes right hip, no fracture. MRI lumbar spine 8/6/19: No fracture. T12-L1, L1 L2, L2 L3, L3 L4, L4-L5 normal canal and foramen. L5-S1 mild canal stenosis, normal foramen. XR R Hip 12/26/18: hip joint spaces maintained. No fracture. EMG B LE 1/10/19: This study is limited, but normal. Although limited, there is no clear electrodiagnostic evidence for a generalized large fiber sensorimotor peripheral polyneuropathy or active lumbosacral motor radiculopathy. XR LS Spine 6/1/18: degenerative disc disease, lumbar facet arthropathy, no fracture. CT Abd Pelvis 4/14/18: stable right lower lung nodule, unchanged May 16. Hysterectomy. Remote RLQ and Left anterior abd wall surgical procedures. CT Abd Pelvis 5/23/16: lung nodules. Hepatic steatosis. Gallbladder sludge. Right kidney calcification. Non obstructing right renal calculi     UDS 7/5/6890: Free of illicits, consistent with Elliott  UDS 6/3/2020: Free of substances, consistent with Elliott  UDS 7/27/53: free of illicits, consistent with Norco.  Opioid risk tool score 0, low risk  Assessment:      Diagnosis Orders   1. Abdominal pain, chronic, right upper quadrant  HYDROcodone-acetaminophen (NORCO) 5-325 MG per tablet   2. Lumbosacral spondylosis without myelopathy  HYDROcodone-acetaminophen (NORCO) 5-325 MG per tablet   3. Pain in both lower extremities  HYDROcodone-acetaminophen (NORCO) 5-325 MG per tablet   4. Chronic, continuous use of opioids  HYDROcodone-acetaminophen (NORCO) 5-325 MG per tablet   5. Chronic pain of left knee  HYDROcodone-acetaminophen (NORCO) 5-325 MG per tablet    MRI KNEE LEFT WO CONTRAST     +Anxiety not on any medicine  -Gabapentin worsened anxiety. Cymbalta mood changes/anger. Lyrica \"high feeling. \"  Plan:     Periodic Controlled Substance Monitoring: Possible medication side effects, risk of tolerance/dependence & alternative treatments discussed., No signs of potential drug abuse or diversion identified. , Assessed functional status., Obtaining appropriate analgesic effect of treatment. Lizeth Paige MD)    Orders Placed This Encounter   Medications    HYDROcodone-acetaminophen (NORCO) 5-325 MG per tablet     Sig: Take 1 tablet by mouth 3 times daily as needed for Pain for up to 30 days. Dispense:  90 tablet     Refill:  0     Reduce doses taken as pain becomes manageable       Orders Placed This Encounter   Procedures    MRI KNEE LEFT WO CONTRAST     Standing Status:   Future     Standing Expiration Date:   9/30/2021     Order Specific Question:   Reason for exam:     Answer:   eval meniscal tear       -follow up with PCP and/or specialists for lung nodules, hernia, vitamin D, kidney stones. Encourage eval with Gen Surgery and GI. Encourage PT for low back pain, Skin biopsy, Rheum and ID eval.   -Reviewed XR L Hip and L Knee, MRI R Hip, XR R C Spine, XR LS Spine above, all questions answered  -She didn't perform US L Knee. Will recommend  -MRI L Knee eval meniscal tear  -Her right arm pain is resolved, will hold on EMG at this time  -Continue Norco 5/325mg TID prn #90 no ref start 7/8-8/7/21. [place] reviewed on 7/2/2021   -Naloxone prescribed on 5/3/21. MME 15  -Continue Magnesium 400 mg daily OTC  -Consideration for left knee injection may be given if her left knee pain worsens      Controlled Substance Monitoring:    Acute and Chronic Pain Monitoring:   RX Monitoring 7/2/2021   Attestation -   Periodic Controlled Substance Monitoring Possible medication side effects, risk of tolerance/dependence & alternative treatments discussed. ;No signs of potential drug abuse or diversion identified. ;Assessed functional status. ;Obtaining appropriate analgesic effect of treatment. Chronic Pain > 50 MEDD -   Chronic Pain > 80 MEDD -        Discussed the risks, side effects, and symptoms that would warrant urgent or emergent physician evaluation of all medications prescribed today.  Provided education and counseling regarding the diagnosis, prognosis, and treatment options. All questions were answered. Encouraged her to follow-up with her primary care physician and/or specialists as required for her overall health and management of her comorbidities as well as any new positive symptoms mentioned in review of systems above. Care was provided within the definitions and limitations of our specialty practice. Encouraged lifestyle interventions including healthy habits, lifestyle changes, regular aerobic exercise and appropriate weight maintenance as advised by their primary care physician or cardiovascular health provider. Discussed well care and disease prevention/maintenance.      All recommendations for medications are meant to help decrease pain, improve function with activities of daily living, maintain compliance with home exercise program, and improve quality of life.      Encouraged compliance with her home exercise program. Discussed the elevated risks of excessive sedation while on pain medications. Advised her against driving or operating heavy machinery or performing any activities where she may harm herself or others while on pain medications. Particular caution was emphasized especially during dose adjustments and medication changes. Discussed the elevated risks of respiratory depression and death while on opioid medications, especially when combined with other sedative substances. Discussed side effects of opioids including, but not limited to, itching, constipation, nausea, and vomiting. The patient does not demonstrate any overt signs of alcohol or drug abuse, and there are no potential contraindications to the use of controlled substances. The relevant previous medical records were reviewed.  The patient continues to make good-cheyanne efforts to reduce and eliminate use of opioids through our comprehensive multidisciplinary pain treatment program.     Discussed the risks of temporary

## 2021-07-08 ENCOUNTER — TELEPHONE (OUTPATIENT)
Dept: PAIN MANAGEMENT | Age: 56
End: 2021-07-08

## 2021-07-08 NOTE — TELEPHONE ENCOUNTER
RECEIVED PA REQUEST VIA FAX FROM PHARM FOR HYDROCODONE 5/325MG.    ID# [id number], GROUP # [id number] CXF#[id number]

## 2021-07-12 ENCOUNTER — HOSPITAL ENCOUNTER (OUTPATIENT)
Dept: PHYSICAL THERAPY | Age: 56
Setting detail: THERAPIES SERIES
Discharge: HOME | End: 2021-07-12
Payer: COMMERCIAL

## 2021-07-12 NOTE — PROGRESS NOTES
She is already on a topical nasal steroid spray.  If she cannot tolerate the oral steroids I would recommend giving the fluid some time to resolve.  If it does not, the next step is to consider placing a PET in that ear.  Schedule RTC 3-4 weeks with audiogram to see Milagro or me.  Thanks.   Therapy                            Cancellation/No-show Note      Date:  2021  Patient Name:  Luis Fernando Gauthier Monday  :  1965   MRN:  [id number]  Referring Practitioner: JAYSHREE Gomes  Diagnosis: lumbosacral spondylosis, without myelopathy, hip pain, left    Visit Information:  PT Visit Information  PT Insurance Information: Medical [place]  Total # of Visits Approved: 60  Total # of Visits to Date: 3  No Show: 0  Canceled Appointment: 2  Progress Note Counter: 3/8-16 Called to cx. sick. For today's appointment patient:  [x]  Cancelled  []  Rescheduled appointment  []  No-show   []  Called pt to remind of next appointment     Reason given by patient:  [x]  Patient ill  []  Conflicting appointment  []  No transportation    []  Conflict with work  []  No reason given  []  Other:      [x] Pt has future appointments scheduled, no follow up needed  [] Pt requests to be on hold.     Reason:   If > 2 weeks please discuss with therapist.  [] Therapist to call pt for follow up     Comments:       Signature: Electronically signed by Lazara Velasquez PTA on 21 at 9:02 AM EDT

## 2021-07-12 NOTE — TELEPHONE ENCOUNTER
Approved on July 9  CaseId:[id number];Status:Approved; Review Type:Prior Auth; Coverage Start Date:06/08/2021; Coverage End Date:07/09/2022;

## 2021-07-19 ENCOUNTER — HOSPITAL ENCOUNTER (OUTPATIENT)
Dept: PHYSICAL THERAPY | Age: 56
Setting detail: THERAPIES SERIES
Discharge: HOME | End: 2021-07-19
Payer: COMMERCIAL

## 2021-07-19 NOTE — PROGRESS NOTES
Therapy                            Cancellation/No-show Note      Date:  2021  Patient Name:  Jay Hope Monday  :  1965   MRN:  [id number]  Referring Practitioner: JAYSHREE Mccoy  Diagnosis: lumbosacral spondylosis, without myelopathy, hip pain, left    Visit Information:  PT Visit Information  PT Insurance Information: Medical [place]  Total # of Visits Approved: 60  Total # of Visits to Date: 3  No Show: 0  Canceled Appointment: 3  Progress Note Counter: 3/8-16 Called to cxCharan sick. For today's appointment patient:  [x]  Cancelled  []  Rescheduled appointment  []  No-show   []  Called pt to remind of next appointment     Reason given by patient:  [x]  Patient ill  []  Conflicting appointment  []  No transportation    []  Conflict with work  []  No reason given  []  Other:      [x] Pt has future appointments scheduled, no follow up needed  [] Pt requests to be on hold.     Reason:   If > 2 weeks please discuss with therapist.  [] Therapist to call pt for follow up     Comments:       Signature: Electronically signed by Delfina Dye PTA on 21 at 9:43 AM EDT

## 2021-07-26 ENCOUNTER — HOSPITAL ENCOUNTER (OUTPATIENT)
Dept: PHYSICAL THERAPY | Age: 56
Setting detail: THERAPIES SERIES
Discharge: HOME | End: 2021-07-26
Payer: COMMERCIAL

## 2021-07-26 PROCEDURE — 97110 THERAPEUTIC EXERCISES: CPT

## 2021-07-26 NOTE — PROGRESS NOTES
[id number] [address]  Outpatient Physical Therapy    Treatment Note        Date: 2021  Patient: Earnest Amador Monday  : 1965  ACCT #: [de-identified]  Referring Practitioner: JAYSHREE Groves  Diagnosis: lumbosacral spondylosis, without myelopathy, hip pain, left  Treatment Diagnosis: decreased posture, decreased lumbar spine ROM, decreased LE, abdominal, and trunk extensor strength, decreased tolerance for prolonged laying/seated positions and decreased tolerance for prolonged standing and amb with evidence of antalgic pattern during gait    Visit Information:  PT Visit Information  PT Insurance Information: Medical [place]  Total # of Visits Approved: 60  Total # of Visits to Date: 4  No Show: 0  Canceled Appointment: 3  Progress Note Counter:     Subjective: Pt reports to not being here for awhile because she had walking pneumonia. She is starting to feel better but not great. Pt reports to no pain due to taking meds this morning. HEP Compliance:  [] Good [x] Fair [] Poor [] Reports not doing due to:    Vital Signs  Patient Currently in Pain: Denies   Pain Screening  Patient Currently in Pain: Denies    OBJECTIVE:   Exercises  Exercise 1: SciFit L1.3 5 min  Exercise 2: TA iso 5\"x15 (gentle D/T mesh and hx of abd hernias)  Exercise 3: H/L hip ABD/ADD 5\"x15 YTB/ball  Exercise 4: H/L bent knee fallouts x15 alternating  Exercise 5: H/L abd march x15  Exercise 6: hamstring stretch 3x30\" b/l w/ stool  Exercise 7: TB lats/rows x15 ea YTB  Exercise 9: Partial squat w/ gentle TA stabilization x10  Exercise 10: *wedge for comfort due to cough  Exercise 11: DLS x 10  Exercise 12: sink ex's x 10 ea  Exercise 20: HEP: continue currrent d/t not being able to do much while sick. *Indicates exercise, modality, or manual techniques to be initiated when appropriate    Assessment:      Body structures, Functions, Activity limitations: Decreased functional mobility , Increased pain, Decreased Program, ROM, Manual Therapy - Soft Tissue Mobilization, Safety Education & Training, Aquatics, Balance Training, Endurance Training, Patient/Caregiver Education & Training, Functional Mobility Training, Equipment Evaluation, Education, & procurement, Gait Training, Modalities, Stair training, Pain Management, Positioning     Pt to continue current HEP. See objective section for any therapeutic exercise changes, additions or modifications this date.          PT Individual Minutes  Time In: 5910  Time Out: 1038  Minutes: 39  Timed Code Treatment Minutes: 39 Minutes  Procedure Minutes: n/a     Timed Activity Minutes Units   Ther Ex         39        3       Signature:  Electronically signed by Isak Amor PTA on 7/26/21 at 10:42 AM EDT 99.9

## 2021-08-03 ENCOUNTER — OFFICE VISIT (OUTPATIENT)
Dept: PAIN MANAGEMENT | Age: 56
End: 2021-08-03
Payer: COMMERCIAL

## 2021-08-03 VITALS
HEIGHT: 63 IN | DIASTOLIC BLOOD PRESSURE: 74 MMHG | TEMPERATURE: 97.3 F | SYSTOLIC BLOOD PRESSURE: 128 MMHG | BODY MASS INDEX: 28.7 KG/M2 | WEIGHT: 162 LBS

## 2021-08-03 DIAGNOSIS — Z79.891 ENCOUNTER FOR MONITORING OPIOID MAINTENANCE THERAPY: ICD-10-CM

## 2021-08-03 DIAGNOSIS — F11.90 CHRONIC, CONTINUOUS USE OF OPIOIDS: ICD-10-CM

## 2021-08-03 DIAGNOSIS — M79.605 PAIN IN BOTH LOWER EXTREMITIES: ICD-10-CM

## 2021-08-03 DIAGNOSIS — M79.604 PAIN IN BOTH LOWER EXTREMITIES: ICD-10-CM

## 2021-08-03 DIAGNOSIS — G89.29 CHRONIC PAIN OF LEFT KNEE: ICD-10-CM

## 2021-08-03 DIAGNOSIS — M25.562 CHRONIC PAIN OF LEFT KNEE: ICD-10-CM

## 2021-08-03 DIAGNOSIS — M47.817 LUMBOSACRAL SPONDYLOSIS WITHOUT MYELOPATHY: ICD-10-CM

## 2021-08-03 DIAGNOSIS — Z51.81 ENCOUNTER FOR MONITORING OPIOID MAINTENANCE THERAPY: ICD-10-CM

## 2021-08-03 DIAGNOSIS — R10.11 ABDOMINAL PAIN, CHRONIC, RIGHT UPPER QUADRANT: ICD-10-CM

## 2021-08-03 DIAGNOSIS — G89.29 ABDOMINAL PAIN, CHRONIC, RIGHT UPPER QUADRANT: ICD-10-CM

## 2021-08-03 PROCEDURE — G8419 CALC BMI OUT NRM PARAM NOF/U: HCPCS | Performed by: NURSE PRACTITIONER

## 2021-08-03 PROCEDURE — 3017F COLORECTAL CA SCREEN DOC REV: CPT | Performed by: NURSE PRACTITIONER

## 2021-08-03 PROCEDURE — G8427 DOCREV CUR MEDS BY ELIG CLIN: HCPCS | Performed by: NURSE PRACTITIONER

## 2021-08-03 PROCEDURE — 99214 OFFICE O/P EST MOD 30 MIN: CPT | Performed by: NURSE PRACTITIONER

## 2021-08-03 PROCEDURE — 4004F PT TOBACCO SCREEN RCVD TLK: CPT | Performed by: NURSE PRACTITIONER

## 2021-08-03 RX ORDER — HYDROCODONE BITARTRATE AND ACETAMINOPHEN 5; 325 MG/1; MG/1
1 TABLET ORAL 3 TIMES DAILY PRN
Qty: 90 TABLET | Refills: 0 | Status: SHIPPED | OUTPATIENT
Start: 2021-08-07 | End: 2021-09-02 | Stop reason: SDUPTHER

## 2021-08-03 ASSESSMENT — ENCOUNTER SYMPTOMS
SHORTNESS OF BREATH: 0
SORE THROAT: 0
ABDOMINAL PAIN: 0
BACK PAIN: 1

## 2021-08-03 NOTE — PROGRESS NOTES
Jaimie RAGSDALE Monday  (1965)    8/3/2021    Subjective:     Shalom Hwang Monday is 50 y.o. female who complains today of:    Chief Complaint   Patient presents with    Follow-up     chronic abdominal pain    Back Pain    Knee Pain         Allergies:  Augmentin [amoxicillin-pot clavulanate], Dye [iodides], Macrobid [nitrofurantoin monohydrate macrocrystals], and Flagyl [metronidazole]    Past Medical History:   Diagnosis Date    Anxiety     GERD (gastroesophageal reflux disease)     History of cellulitis and abscess     Irritable bowel syndrome      Past Surgical History:   Procedure Laterality Date   [address], 2012    HYSTERECTOMY  2009    OTHER SURGICAL HISTORY  02/11/15    DR Jade Ledesma  EXCISION OF LT LOWER QUAD NONHEALING SKIN LESION    SMALL INTESTINE SURGERY  2012    bowel obstruction @ [address] [place] [address] Dr. Nicolás Pearce  [id number]    DR. Jose Cruz     Family History   Problem Relation Age of Onset    Diabetes Mother     Heart Disease Mother         stints    Diabetes Sister     High Blood Pressure Sister      Social History     Socioeconomic History    Marital status:      Spouse name: Not on file    Number of children: Not on file    Years of education: Not on file    Highest education level: Not on file   Occupational History    Not on file   Tobacco Use    Smoking status: Current Some Day Smoker     Packs/day: 0.25     Years: 15.00     Pack years: 3.75     Types: Cigarettes    Smokeless tobacco: Never Used    Tobacco comment: 4/day    Substance and Sexual Activity    Alcohol use: No    Drug use: No    Sexual activity: Not on file   Other Topics Concern    Not on file   Social History Narrative    Not on file     Social Determinants of Health     Financial Resource Strain:     Difficulty of Paying Living Expenses:    Food Insecurity:     Worried About Running Out of Food in the Last Year:     Denisa of Food in the Last Year:    Transportation Needs:     Lack of Transportation (Medical):  Lack of Transportation (Non-Medical):    Physical Activity:     Days of Exercise per Week:     Minutes of Exercise per Session:    Stress:     Feeling of Stress :    Social Connections:     Frequency of Communication with Friends and Family:     Frequency of Social Gatherings with Friends and Family:     Attends Protestant Services:     Active Member of Clubs or Organizations:     Attends Club or Organization Meetings:     Marital Status:    Intimate Partner Violence:     Fear of Current or Ex-Partner:     Emotionally Abused:     Physically Abused:     Sexually Abused:        Current Outpatient Medications on File Prior to Visit   Medication Sig Dispense Refill    lidocaine (LMX) 4 % cream Apply a half dollar sized amount to intact skin topically up to twice daily as needed for pain 1 Tube 1    vitamin D (ERGOCALCIFEROL) 84390 units CAPS capsule Take 1 capsule by mouth once a week 12 capsule 1    magnesium oxide (MAG-OX) 400 (240 Mg) MG tablet Take 1 tablet by mouth daily 30 tablet 0     No current facility-administered medications on file prior to visit. Pt presents today for a f/u of chronic left knee, abdominal (R>L), and mid back  pain. MRI left knee ordered last month, patient did not hear back about approval. She is doing PT \"the whole body\" once/week. Pt feels pain level and functioning improves with prescribed medications and is able to do PT. Pt feels that certain positions aggravates the pain. Pt c/o occasional generalized radiating numbness and tingling. she has her dental implants.  Abdomen pain has been a constant ache since undergoing abdominal surgery mesh surgical placement after incarcerated spigelian hernia repair on July 23, 2012 by Dr. Santoro. Was told mesh cannot be taken out due to scar tissue.          Review of Systems   Constitutional: Negative for fever.    HENT: Negative for congestion and sore throat. Respiratory: Negative for shortness of breath. Gastrointestinal: Negative for abdominal pain. Genitourinary: Negative for difficulty urinating. Musculoskeletal: Positive for back pain. Neurological: Negative for speech difficulty and headaches. Hematological: Negative for adenopathy. Psychiatric/Behavioral: Negative for agitation. All other systems reviewed and are negative. Objective:     Vitals:  /74 (Site: Right Upper Arm, Position: Sitting, Cuff Size: Medium Adult)   Temp 97.3 °F (36.3 °C) (Infrared)   Ht 5' 3\" (1.6 m)   Wt 162 lb (73.5 kg)   LMP 01/01/2009   BMI 28.70 kg/m² Pain Score:   6      Physical Exam  Vitals and nursing note reviewed. Pt is alert and oriented x 3. Recent and remote memory is intact. Mood, judgement and affect are normal.  No signs of distress or SOB noted. Visualized skin intact. Sensation intact to light touch. Decreased ROM with flexion and extension of low back. Tender with palpation to bilateral lumbar spine with positive provacative maneuvers noted. Negative SLR. Pt is able to briefly heel walk and toe walk. Strength, balance, and coordination are functional for ambulation. RLQ tender to palpation. Surgical scars noted. Left knee with decreased ROM. Tenderness noted with palpation. Mild swelling noted with arthritic deformity noted. Crepitus with ROM. Gait antalgic. Reviewed Dr. Soheila Montejo note from 7/2/21 :    XR L hip 6/29/2021: Other moderate degenerative changes bilaterally symmetric, joint space is preserved. No fracture. XR L knee 6/29/2021: Joint spaces preserved. XR C-spine 6/3/2020: Displaced heights maintained, no fracture, facet arthropathy, instability C3-C6. XR L-spine 6/3/2020: Degenerative disc disease, facet arthropathy, no fracture.   XR L knee 6/3/2020 standing films demonstrate preservation of joint spaces, no fracture  MRI R hip 3/12/2020: Small osteophytes, degenerative changes within the labrum. Left hip unremarkable. Normal SI joints. Minimal degenerative changes right hip, no fracture. MRI lumbar spine 8/6/19: No fracture.  T12-L1, L1 L2, L2 L3, L3 L4, L4-L5 normal canal and foramen.  L5-S1 mild canal stenosis, normal foramen. XR R Hip 12/26/18: hip joint spaces maintained. No fracture. EMG B LE 1/10/19: This study is limited, but normal. Although limited, there is no clear electrodiagnostic evidence for a generalized large fiber sensorimotor peripheral polyneuropathy or active lumbosacral motor radiculopathy. XR LS Spine 6/1/18: degenerative disc disease, lumbar facet arthropathy, no fracture. CT Abd Pelvis 4/14/18: stable right lower lung nodule, unchanged May 16. Hysterectomy. Remote RLQ and Left anterior abd wall surgical procedures. CT Abd Pelvis 5/23/16: lung nodules. Hepatic steatosis. Gallbladder sludge. Right kidney calcification. Non obstructing right renal calculi     UDS 4/1/1749: Free of illicits, consistent with [place]  UDS 6/3/2020: Free of substances, consistent with [place]  UDS 8/49/42: free of illicits, consistent with [place].  Opioid risk tool score 0, low risk  Assessment:      Diagnosis Orders   1. Abdominal pain, chronic, right upper quadrant  HYDROcodone-acetaminophen (NORCO) 5-325 MG per tablet   2. Lumbosacral spondylosis without myelopathy  HYDROcodone-acetaminophen (NORCO) 5-325 MG per tablet   3. Pain in both lower extremities  HYDROcodone-acetaminophen (NORCO) 5-325 MG per tablet   4. Chronic, continuous use of opioids  HYDROcodone-acetaminophen (NORCO) 5-325 MG per tablet   5. Chronic pain of left knee  HYDROcodone-acetaminophen (NORCO) 5-325 MG per tablet   6.  Encounter for monitoring opioid maintenance therapy  HYDROcodone-acetaminophen (NORCO) 5-325 MG per tablet       Plan:     Periodic Controlled Substance Monitoring: Possible medication side effects, risk of tolerance/dependence & alternative treatments discussed., No signs of potential drug abuse or diversion identified. , Assessed functional status., Obtaining appropriate analgesic effect of treatment. IVÁN Varghese - CNP)    Orders Placed This Encounter   Medications    HYDROcodone-acetaminophen (NORCO) 5-325 MG per tablet     Sig: Take 1 tablet by mouth 3 times daily as needed for Pain for up to 30 days. Dispense:  90 tablet     Refill:  0     Reduce doses taken as pain becomes manageable       No orders of the defined types were placed in this encounter. Discussed options with the patient today. Portillo Pauly for chronic multijoint pain. MRI left knee not completed, will see if approved. Options limited for abdominal pain. She will continue PT external.  All questions were answered. Pt verbalized understanding and agrees with above plan. Utox reviewed and appropriate from 5/3/21. Narcan sent 5/3/21. Will continue medications for chronic pain as they help pt function with ADL and improve quality of life. Discussed possible risks of opiate medication with pt, including but not limited to, constipation, nausea or vomiting, sedation, urinary retention, dependence and possible addiction. Pt agrees to use medication as directed. Pt advised to not use opiates while driving or operating heavy equipment, or in situations where pt may harm him/herself or others. Pt is aware that while on narcotics, pt needs to be seen monthly to reassess pain and need for continued medication. NDP reviewed. OARRS was reviewed. This NP saw pt under direct supervision of Dr. Derrek Mullins. Follow up:  Return in about 4 weeks (around 8/31/2021) for review meds and reassess pain.     Gar Lanes, APRN - JUL[facility]N

## 2021-08-04 ENCOUNTER — HOSPITAL ENCOUNTER (OUTPATIENT)
Dept: PHYSICAL THERAPY | Age: 56
Setting detail: THERAPIES SERIES
Discharge: HOME | End: 2021-08-04
Payer: COMMERCIAL

## 2021-08-04 NOTE — PROGRESS NOTES
Therapy                            Cancellation/No-show Note      Date:  2021  Patient Name:  Pastor Hutton Monday  :  1965   MRN:  [id number]  Referring Practitioner: JAYSHREE Krause  Diagnosis: lumbosacral spondylosis, without myelopathy, hip pain, left    Visit Information:  PT Visit Information  PT Insurance Information: Medical [place]  Total # of Visits Approved: 60  Total # of Visits to Date: 4  No Show: 0  Canceled Appointment: 4  Progress Note Counter:  (cx 2021_    For today's appointment patient:  [x]  Cancelled  []  Rescheduled appointment  []  No-show   []  Called pt to remind of next appointment     Reason given by patient:  [x]  Patient ill  []  Conflicting appointment  []  No transportation    []  Conflict with work  []  No reason given  []  Other:      [x] Pt has future appointments scheduled, no follow up needed  [] Pt requests to be on hold.     Reason:   If > 2 weeks please discuss with therapist.  [] Therapist to call pt for follow up         Signature: Electronically signed by Patrecia Cowden, PT on 21 at 9:59 AM EDT

## 2021-08-10 ENCOUNTER — HOSPITAL ENCOUNTER (OUTPATIENT)
Dept: PHYSICAL THERAPY | Age: 56
Setting detail: THERAPIES SERIES
Discharge: HOME | End: 2021-08-10
Payer: COMMERCIAL

## 2021-08-10 PROCEDURE — 97110 THERAPEUTIC EXERCISES: CPT

## 2021-08-10 ASSESSMENT — PAIN DESCRIPTION - ORIENTATION: ORIENTATION: LOWER;LEFT

## 2021-08-10 ASSESSMENT — PAIN DESCRIPTION - LOCATION: LOCATION: BACK;KNEE

## 2021-08-10 ASSESSMENT — PAIN SCALES - GENERAL: PAINLEVEL_OUTOF10: 6

## 2021-08-10 NOTE — PROGRESS NOTES
Patient/Caregiver Education & Training, Functional Mobility Training, Equipment Evaluation, Education, & procurement, Gait Training, Modalities, Stair training, Pain Management, Positioning     Pt to continue current HEP. See objective section for any therapeutic exercise changes, additions or modifications this date.          PT Individual Minutes  Time In: 4229  Time Out: 1034  Minutes: 39  Timed Code Treatment Minutes: 39 Minutes  Procedure Minutes:0     Timed Activity Minutes Units   Ther Ex 39 3     Signature:  Electronically signed by Sage Nick PTA on 8/10/21 at 10:55 AM EDT

## 2021-08-13 ENCOUNTER — TELEPHONE (OUTPATIENT)
Dept: PAIN MANAGEMENT | Age: 56
End: 2021-08-13

## 2021-08-13 NOTE — TELEPHONE ENCOUNTER
We received message from [facility] (via [address]) informing us patient has not scheduled MRI Left Knee w/out contrast.  I called patient, automated message states voicemail is not set up. Not able to leave a message.

## 2021-08-17 ENCOUNTER — HOSPITAL ENCOUNTER (OUTPATIENT)
Dept: PHYSICAL THERAPY | Age: 56
Setting detail: THERAPIES SERIES
Discharge: HOME | End: 2021-08-17
Payer: COMMERCIAL

## 2021-08-17 DIAGNOSIS — F40.240 CLAUSTROPHOBIA: ICD-10-CM

## 2021-08-17 PROCEDURE — 97110 THERAPEUTIC EXERCISES: CPT

## 2021-08-17 RX ORDER — DIAZEPAM 5 MG/1
5 TABLET ORAL 2 TIMES DAILY PRN
Qty: 2 TABLET | Refills: 0 | Status: SHIPPED | OUTPATIENT
Start: 2021-08-17 | End: 2021-08-18

## 2021-08-17 ASSESSMENT — PAIN DESCRIPTION - LOCATION: LOCATION: BACK;LEG

## 2021-08-17 ASSESSMENT — PAIN SCALES - GENERAL: PAINLEVEL_OUTOF10: 6

## 2021-08-17 ASSESSMENT — PAIN DESCRIPTION - ORIENTATION: ORIENTATION: LEFT;LOWER

## 2021-08-17 NOTE — TELEPHONE ENCOUNTER
Requested Prescriptions     Pending Prescriptions Disp Refills    diazePAM (VALIUM) 5 MG tablet 1 tablet 0     Sig: Take 1 tablet by mouth 2 times daily as needed (MRI Sedation) for up to 1 day. Take 5 mg by mouth thirty minutes prior to study. Do not drive while on this medication. Patient last seen on:  8/3  Date of last surgery:  n/a  Date of last refill:  2/14/20  Pain level:  n/a  Patient complaining of:  Pt asking for medication for MRI.    Future appts: 9/2

## 2021-08-17 NOTE — TELEPHONE ENCOUNTER
Valium Diazepam 5 mg #2 no ref start 8/17 for MRI . Do not drive while on this medicine    Controlled Substance Monitoring:    Acute and Chronic Pain Monitoring:   RX Monitoring 8/17/2021   Attestation -   Periodic Controlled Substance Monitoring Obtaining appropriate analgesic effect of treatment.    Chronic Pain > 50 MEDD -   Chronic Pain > 80 MEDD -

## 2021-08-17 NOTE — PROGRESS NOTES
[address] [address]  Outpatient Physical Therapy    Treatment Note        Date: 2021  Patient: Monique Dancer Monday  : 1965  ACCT #: [de-identified]  Referring Practitioner: JAYSHREE Gaitan  Diagnosis: lumbosacral spondylosis, without myelopathy, hip pain, left  Treatment Diagnosis: decreased posture, decreased lumbar spine ROM, decreased LE, abdominal, and trunk extensor strength, decreased tolerance for prolonged laying/seated positions and decreased tolerance for prolonged standing and amb with evidence of antalgic pattern during gait    Visit Information:  PT Visit Information  PT Insurance Information: Medical [place]  Total # of Visits Approved: 60  Total # of Visits to Date: 6  No Show: 0  Canceled Appointment: 4  Progress Note Counter: -    Subjective: -6/10 low back and L leg  Comments: x-ray L knee 2021: Upright view of both knees are included. Joint spaces preserved. Bony structures intact. Soft tissues unremarkable. HEP Compliance:  [] Good [x] Fair [] Poor [] Reports not doing due to:    Vital Signs  Patient Currently in Pain: Yes   Pain Screening  Patient Currently in Pain: Yes  Pain Assessment  Pain Level: 6  Pain Location: Back;Leg  Pain Orientation: Left; Lower    OBJECTIVE:   Exercises  Exercise 1: SciFit L2.5 5 min  Exercise 4: H/L bent knee fallouts x20 alternating  Exercise 6: hamstring stretch 3x30\" b/l w/ stool  Exercise 7: piriformis stretch 30 sec X 3 b/l- cues for pain free ROM L  Exercise 8: bridge 2 X 10  Exercise 10: *wedge for comfort due to cough  Exercise 11: DLS x 15  Exercise 13: SLR 2 x 10 B  Exercise 14: clam X 10 b/l  Exercise 15: hip abd in s/l X 10 b/l  Exercise 20: HEP: continue current    Strength: [x] NT  [] MMT completed:    ROM: [x] NT  [] ROM measurements:  *Indicates exercise, modality, or manual techniques to be initiated when appropriate    Assessment:    Body structures, Functions, Activity limitations: Decreased functional mobility , Frequency/Duration:  Plan  Times per week: 1-2  Plan weeks: 6-8  Specific instructions for Next Treatment: Pool HEP to be provided, cont land per pt request  Current Treatment Recommendations: Strengthening, Neuromuscular Re-education, Home Exercise Program, ROM, Manual Therapy - Soft Tissue Mobilization, Safety Education & Training, Aquatics, Balance Training, Endurance Training, Patient/Caregiver Education & Training, Functional Mobility Training, Equipment Evaluation, Education, & procurement, Gait Training, Modalities, Stair training, Pain Management, Positioning     Pt to continue current HEP. See objective section for any therapeutic exercise changes, additions or modifications this date.     PT Individual Minutes  Time In: 1001  Time Out: 4022  Minutes: 39  Timed Code Treatment Minutes: 39 Minutes     Timed Activity Minutes Units   Ther Ex 39 3       Signature:  Electronically signed by Alycia Huggins PT on 8/17/21 at 12:16 PM EDT

## 2021-08-24 ENCOUNTER — HOSPITAL ENCOUNTER (OUTPATIENT)
Dept: PHYSICAL THERAPY | Age: 56
Setting detail: THERAPIES SERIES
Discharge: HOME | End: 2021-08-24
Payer: COMMERCIAL

## 2021-08-24 PROCEDURE — 97110 THERAPEUTIC EXERCISES: CPT

## 2021-08-24 ASSESSMENT — PAIN DESCRIPTION - DESCRIPTORS: DESCRIPTORS: ACHING;SHARP;SORE;DULL

## 2021-08-24 ASSESSMENT — PAIN DESCRIPTION - LOCATION: LOCATION: GROIN;HIP;BACK

## 2021-08-24 ASSESSMENT — PAIN DESCRIPTION - ORIENTATION: ORIENTATION: LEFT

## 2021-08-24 ASSESSMENT — PAIN SCALES - GENERAL: PAINLEVEL_OUTOF10: 8

## 2021-08-24 NOTE — PROGRESS NOTES
[address] [address]  Outpatient Physical Therapy    Treatment Note        Date: 2021  Patient: Karl Grey Monday  : 1965  ACCT #: [de-identified]  Referring Practitioner: JAYSHREE Shoemaker  Diagnosis: lumbosacral spondylosis, without myelopathy, hip pain, left  Treatment Diagnosis: decreased posture, decreased lumbar spine ROM, decreased LE, abdominal, and trunk extensor strength, decreased tolerance for prolonged laying/seated positions and decreased tolerance for prolonged standing and amb with evidence of antalgic pattern during gait    Visit Information:  PT Visit Information  PT Insurance Information: Medical [place]  Total # of Visits Approved: 60  Total # of Visits to Date: 7  No Show: 0  Canceled Appointment: 4  Progress Note Counter: -    Subjective: Pt reports LB feels normal hip and leg 8/10\" I over did it yesterday\"  Comments: x-ray L knee 2021: Upright view of both knees are included. Joint spaces preserved. Bony structures intact. Soft tissues unremarkable. HEP Compliance:  [x] Good [] Fair [] Poor [] Reports not doing due to:    Vital Signs  Patient Currently in Pain: Yes   Pain Screening  Patient Currently in Pain: Yes  Pain Assessment  Pain Level: 8  Pain Location: Groin;Hip;Back  Pain Orientation: Left  Pain Radiating Towards: L hip to mid thigh  Pain Descriptors: Aching; Sharp;Sore;Dull    OBJECTIVE:   Exercises  Exercise 1: SciFit L2.5 5 min  Exercise 4: H/L bent knee fallouts x20 alternating  Exercise 6: hamstring stretch 3x30\" b/l w/ stool  Exercise 7: piriformis stretch 30 sec X 3 b/l- cues for pain free ROM L  Exercise 8: bridge 2 X 10  Exercise 13: SLR 2 x 10 R, x 5 L  Exercise 14: clam R X 15, L x 10 b/l  Exercise 15: hip abd in s/l X 10 b/l       Strength: [x] NT  [] MMT completed:     ROM: [x] NT  [] ROM measurements:     *Indicates exercise, modality, or manual techniques to be initiated when appropriate    Assessment:         Body structures, Functions, Activity limitations: Decreased functional mobility , Increased pain, Decreased posture, Decreased ROM, Decreased strength, Decreased endurance  Assessment: Pt presented with pain in left groin pain from over doing activity yesterday. Still struggles with abdominal ex, had episode of \"Mess flipped\" while doing S/L Abd right stood and sidebends and back in. Pain decreased 3-4/10 post session. Instructed to ice at home 2-3 x daily. Treatment Diagnosis: decreased posture, decreased lumbar spine ROM, decreased LE, abdominal, and trunk extensor strength, decreased tolerance for prolonged laying/seated positions and decreased tolerance for prolonged standing and amb with evidence of antalgic pattern during gait          Goals:  Short term goals  Time Frame for Short term goals: 2 wks  Short term goal 1: Pt will demonstrate improved trunk extensor, abdominal, and B LE strength >/= 4- to 4/5 for carryover to decreased pain with prolonged amb and prolonged standing >15 min. Long term goals  Time Frame for Long term goals : 6-8 wks  Long term goal 1: Pt will demonstrate indep with % of the time for self management of low back pain and L LE pain. Long term goal 2: Pt will demonstrate improved score on LEFS >/= 60/80 indicating minimal distability for improved pt quality of life. Long term goal 3: The pt will report decreased pain </=2/10 at worst in order to increase ease with caring for grandkids and parents. Progress toward goals: Progressing towards goals. POST -PAIN       Pain Rating (0-10 pain scale):   3-4/10   Location and pain description same as pre-treatment unless indicated.    Action: [] NA   [x] Perform HEP  [] Meds as prescribed  [x] Modalities as prescribed   [] Call Physician     Frequency/Duration:  Plan  Times per week: 1-2  Plan weeks: 6-8  Specific instructions for Next Treatment: Pool HEP to be provided, cont land per pt request  Current Treatment Recommendations: Strengthening, Neuromuscular Re-education, Home Exercise Program, ROM, Manual Therapy - Soft Tissue Mobilization, Safety Education & Training, Aquatics, Balance Training, Endurance Training, Patient/Caregiver Education & Training, Functional Mobility Training, Equipment Evaluation, Education, & procurement, Gait Training, Modalities, Stair training, Pain Management, Positioning     Pt to continue current HEP. See objective section for any therapeutic exercise changes, additions or modifications this date.          PT Individual Minutes  Time In: 6660  Time Out: 0733  Minutes: 39  Timed Code Treatment Minutes: 39 Minutes  Procedure Minutes:0     Timed Activity Minutes Units   Ther Ex 39 2     Signature:  Electronically signed by Ada Naik PTA on 8/24/21 at 11:45 AM EDT

## 2021-08-31 ENCOUNTER — HOSPITAL ENCOUNTER (OUTPATIENT)
Dept: PHYSICAL THERAPY | Age: 56
Setting detail: THERAPIES SERIES
Discharge: HOME | End: 2021-08-31
Payer: COMMERCIAL

## 2021-08-31 PROCEDURE — 97110 THERAPEUTIC EXERCISES: CPT

## 2021-08-31 ASSESSMENT — PAIN DESCRIPTION - LOCATION: LOCATION: BACK;HIP

## 2021-08-31 ASSESSMENT — PAIN SCALES - GENERAL: PAINLEVEL_OUTOF10: 7

## 2021-08-31 ASSESSMENT — PAIN DESCRIPTION - ORIENTATION: ORIENTATION: LEFT

## 2021-08-31 NOTE — PROGRESS NOTES
[address] [address]  Outpatient Physical Therapy    Treatment Note        Date: 2021  Patient: Nii Stone Monday  : 1965  ACCT #: [de-identified]  Referring Practitioner: JAYSHREE Farah  Diagnosis: lumbosacral spondylosis, without myelopathy, hip pain, left  Treatment Diagnosis: decreased posture, decreased lumbar spine ROM, decreased LE, abdominal, and trunk extensor strength, decreased tolerance for prolonged laying/seated positions and decreased tolerance for prolonged standing and amb with evidence of antalgic pattern during gait    Visit Information:  PT Visit Information  PT Insurance Information: Medical [place]  Total # of Visits Approved: 60  Total # of Visits to Date: 8  No Show: 0  Canceled Appointment: 4  Progress Note Counter: -    Subjective: Pt presenting to appt reporting 7/10 pain level \"betweenmy stomach and my hip. \" Pt reports feeling better following last tx stating \"I felt more stretched out. \"  Comments: x-ray L knee 2021: Upright view of both knees are included. Joint spaces preserved. Bony structures intact. Soft tissues unremarkable. HEP Compliance:  [x] Good [] Fair [] Poor [] Reports not doing due to:    Vital Signs  Patient Currently in Pain: Yes   Pain Screening  Patient Currently in Pain: Yes  Pain Assessment  Pain Assessment: 0-10  Pain Level: 7  Pain Location: Back; Hip  Pain Orientation: Left    OBJECTIVE:   Exercises  Exercise 1: SciFit L2.5 5 min  Exercise 5: Seated abd march w/ RTB around knees x20  Exercise 6: hamstring stretch 3x30\" b/l w/ stool  Exercise 7: piriformis stretch 30 sec X 3 b/l- cues for pain free ROM L  Exercise 8: bridge 2 X 10  Exercise 9: STS from chair w/ TA preactivation x10 (focus on minimal to no use of UE assist)  Exercise 10: RTB lats/rows RTB 2x10 ea  Exercise 13: SLR 2 x 10 R, x 5 L  Exercise 15: hip abd in s/l X 10 b/l    Strength: [x] NT  [] MMT completed:     ROM: [x] NT  [] ROM measurements: Modalities:  Modalities  Other: Pt declined- prefers use of Biofreeze at home, has heat and ice packs     *Indicates exercise, modality, or manual techniques to be initiated when appropriate    Assessment: Body structures, Functions, Activity limitations: Decreased functional mobility , Increased pain, Decreased posture, Decreased ROM, Decreased strength, Decreased endurance  Assessment: Pt easily distracted w/ conversation throughout tx therefore cuing to keep pt focused on exs today. Addition of STS as well as added resistance w/ seated marches and lats/rows to challenge TA preactivation and stability w/ functional tasks. S/L hip ABD cont's to be difficulty therefore no further progression of reps attempted. Pt denies inc'd pain pre to post tx. Treatment Diagnosis: decreased posture, decreased lumbar spine ROM, decreased LE, abdominal, and trunk extensor strength, decreased tolerance for prolonged laying/seated positions and decreased tolerance for prolonged standing and amb with evidence of antalgic pattern during gait     Goals:  Short term goals  Time Frame for Short term goals: 2 wks  Short term goal 1: Pt will demonstrate improved trunk extensor, abdominal, and B LE strength >/= 4- to 4/5 for carryover to decreased pain with prolonged amb and prolonged standing >15 min. Long term goals  Time Frame for Long term goals : 6-8 wks  Long term goal 1: Pt will demonstrate indep with % of the time for self management of low back pain and L LE pain. Long term goal 2: Pt will demonstrate improved score on LEFS >/= 60/80 indicating minimal distability for improved pt quality of life. Long term goal 3: The pt will report decreased pain </=2/10 at worst in order to increase ease with caring for grandkids and parents. Progress toward goals: strength    POST-PAIN       Pain Rating (0-10 pain scale):   6/10   Location and pain description same as pre-treatment unless indicated.    Action: [] NA   [x] Perform HEP  [] Meds as prescribed  [x] Modalities as prescribed   [] Call Physician     Frequency/Duration:  Plan  Times per week: 1-2  Plan weeks: 6-8  Specific instructions for Next Treatment: Pool HEP to be provided, cont land per pt request  Current Treatment Recommendations: Strengthening, Neuromuscular Re-education, Home Exercise Program, ROM, Manual Therapy - Soft Tissue Mobilization, Safety Education & Training, Aquatics, Balance Training, Endurance Training, Patient/Caregiver Education & Training, Functional Mobility Training, Equipment Evaluation, Education, & procurement, Gait Training, Modalities, Stair training, Pain Management, Positioning     Pt to continue current HEP. See objective section for any therapeutic exercise changes, additions or modifications this date.      PT Individual Minutes  Time In: 8212  Time Out: 4821  Minutes: 38  Timed Code Treatment Minutes: 38 Minutes  Procedure Minutes: N/A      Timed Activity Minutes Units   Ther Ex 38 3   Signature:  Electronically signed by Tiffanie Gonzales PTA on 8/31/21 at 11:01 AM EDT

## 2021-09-02 ENCOUNTER — OFFICE VISIT (OUTPATIENT)
Dept: PAIN MANAGEMENT | Age: 56
End: 2021-09-02
Payer: COMMERCIAL

## 2021-09-02 VITALS — HEIGHT: 64 IN | WEIGHT: 156 LBS | TEMPERATURE: 97.2 F | BODY MASS INDEX: 26.63 KG/M2

## 2021-09-02 DIAGNOSIS — M25.562 CHRONIC PAIN OF LEFT KNEE: ICD-10-CM

## 2021-09-02 DIAGNOSIS — M79.605 PAIN IN BOTH LOWER EXTREMITIES: ICD-10-CM

## 2021-09-02 DIAGNOSIS — M47.817 LUMBOSACRAL SPONDYLOSIS WITHOUT MYELOPATHY: ICD-10-CM

## 2021-09-02 DIAGNOSIS — R10.11 ABDOMINAL PAIN, CHRONIC, RIGHT UPPER QUADRANT: ICD-10-CM

## 2021-09-02 DIAGNOSIS — F11.90 CHRONIC, CONTINUOUS USE OF OPIOIDS: ICD-10-CM

## 2021-09-02 DIAGNOSIS — M79.604 PAIN IN BOTH LOWER EXTREMITIES: ICD-10-CM

## 2021-09-02 DIAGNOSIS — G89.29 ABDOMINAL PAIN, CHRONIC, RIGHT UPPER QUADRANT: ICD-10-CM

## 2021-09-02 DIAGNOSIS — Z51.81 ENCOUNTER FOR MONITORING OPIOID MAINTENANCE THERAPY: ICD-10-CM

## 2021-09-02 DIAGNOSIS — Z79.891 ENCOUNTER FOR MONITORING OPIOID MAINTENANCE THERAPY: ICD-10-CM

## 2021-09-02 DIAGNOSIS — G89.29 CHRONIC PAIN OF LEFT KNEE: ICD-10-CM

## 2021-09-02 PROCEDURE — 3017F COLORECTAL CA SCREEN DOC REV: CPT | Performed by: NURSE PRACTITIONER

## 2021-09-02 PROCEDURE — G8419 CALC BMI OUT NRM PARAM NOF/U: HCPCS | Performed by: NURSE PRACTITIONER

## 2021-09-02 PROCEDURE — 4004F PT TOBACCO SCREEN RCVD TLK: CPT | Performed by: NURSE PRACTITIONER

## 2021-09-02 PROCEDURE — 99214 OFFICE O/P EST MOD 30 MIN: CPT | Performed by: NURSE PRACTITIONER

## 2021-09-02 PROCEDURE — G8427 DOCREV CUR MEDS BY ELIG CLIN: HCPCS | Performed by: NURSE PRACTITIONER

## 2021-09-02 RX ORDER — HYDROCODONE BITARTRATE AND ACETAMINOPHEN 5; 325 MG/1; MG/1
1 TABLET ORAL 3 TIMES DAILY PRN
Qty: 90 TABLET | Refills: 0 | Status: SHIPPED | OUTPATIENT
Start: 2021-09-06 | End: 2021-10-05 | Stop reason: SDUPTHER

## 2021-09-02 ASSESSMENT — ENCOUNTER SYMPTOMS
ABDOMINAL PAIN: 0
SORE THROAT: 0
BACK PAIN: 1
SHORTNESS OF BREATH: 0

## 2021-09-02 NOTE — PROGRESS NOTES
Jaimie RAGSDALE Monday  (1965)    9/2/2021    Subjective:     Nii Stone Monday is 50 y.o. female who complains today of:    Chief Complaint   Patient presents with    Back Pain    Knee Pain    Hip Pain         Allergies:  Augmentin [amoxicillin-pot clavulanate], Dye [iodides], Macrobid [nitrofurantoin monohydrate macrocrystals], and Flagyl [metronidazole]    Past Medical History:   Diagnosis Date    Anxiety     GERD (gastroesophageal reflux disease)     History of cellulitis and abscess     Irritable bowel syndrome      Past Surgical History:   Procedure Laterality Date   [address], 2012    HYSTERECTOMY  2009    OTHER SURGICAL HISTORY  02/11/15    DR Ugarte  EXCISION OF LT LOWER QUAD NONHEALING SKIN LESION    SMALL INTESTINE SURGERY  2012    bowel obstruction @ [facility] Dr. Klarissa Page  [id number]    DR. Jennifer Kelly     Family History   Problem Relation Age of Onset    Diabetes Mother     Heart Disease Mother         stints    Diabetes Sister     High Blood Pressure Sister      Social History     Socioeconomic History    Marital status:      Spouse name: Not on file    Number of children: Not on file    Years of education: Not on file    Highest education level: Not on file   Occupational History    Not on file   Tobacco Use    Smoking status: Current Some Day Smoker     Packs/day: 0.25     Years: 15.00     Pack years: 3.75     Types: Cigarettes    Smokeless tobacco: Never Used    Tobacco comment: 4/day    Substance and Sexual Activity    Alcohol use: No    Drug use: No    Sexual activity: Not on file   Other Topics Concern    Not on file   Social History Narrative    Not on file     Social Determinants of Health     Financial Resource Strain:     Difficulty of Paying Living Expenses:    Food Insecurity:     Worried About Running Out of Food in the Last Year:     Denisa of Food in the Last Year:    Transportation Needs:     Lack of Transportation (Medical):  Lack of Transportation (Non-Medical):    Physical Activity:     Days of Exercise per Week:     Minutes of Exercise per Session:    Stress:     Feeling of Stress :    Social Connections:     Frequency of Communication with Friends and Family:     Frequency of Social Gatherings with Friends and Family:     Attends Evangelical Services:     Active Member of Clubs or Organizations:     Attends Club or Organization Meetings:     Marital Status:    Intimate Partner Violence:     Fear of Current or Ex-Partner:     Emotionally Abused:     Physically Abused:     Sexually Abused:        Current Outpatient Medications on File Prior to Visit   Medication Sig Dispense Refill    magnesium oxide (MAG-OX) 400 (240 Mg) MG tablet Take 1 tablet by mouth daily 30 tablet 0    lidocaine (LMX) 4 % cream Apply a half dollar sized amount to intact skin topically up to twice daily as needed for pain 1 Tube 1    vitamin D (ERGOCALCIFEROL) 05568 units CAPS capsule Take 1 capsule by mouth once a week 12 capsule 1     No current facility-administered medications on file prior to visit. Pt presents today for a f/u of chronic left knee, abdominal (R>L), and mid back  pain. MRI left knee being done 9/9. She is doing PT \"the whole body\" once/week at [facility]. Pt feels pain level and functioning improves with prescribed medications and is able to do PT. Pt feels that certain positions aggravates the pain. Pt c/o occasional generalized radiating numbness and tingling. she has her dental implants.  Abdomen pain has been a constant ache since undergoing abdominal surgery mesh surgical placement after incarcerated spigelian hernia repair on July 23, 2012 by Dr. Santoro. Was told mesh cannot be taken out due to scar tissue.          Review of Systems   Constitutional: Negative for fever. HENT: Negative for congestion and sore throat.     Respiratory: Negative for shortness of breath. Gastrointestinal: Negative for abdominal pain. Genitourinary: Negative for difficulty urinating. Musculoskeletal: Positive for back pain. Neurological: Negative for speech difficulty and headaches. Hematological: Negative for adenopathy. Psychiatric/Behavioral: Negative for agitation. All other systems reviewed and are negative. Objective:     Vitals:  Temp 97.2 °F (36.2 °C)   Ht 5' 4\" (1.626 m)   Wt 156 lb (70.8 kg)   LMP 01/01/2009   BMI 26.78 kg/m² Pain Score:   6      Physical Exam  Vitals and nursing note reviewed. Pt is alert and oriented x 3. Recent and remote memory is intact. Mood, judgement and affect are normal.  No signs of distress or SOB noted. Visualized skin intact. Sensation intact to light touch. Decreased ROM with flexion and extension of low back. Tender with palpation to bilateral lumbar spine with positive provacative maneuvers noted. Negative SLR. Pt is able to briefly heel walk and toe walk. Strength, balance, and coordination are functional for ambulation. RLQ tender to palpation. Surgical scars noted. Left knee with decreased ROM. Tenderness noted with palpation. Mild swelling noted with arthritic deformity noted. Crepitus with ROM. Gait antalgic.             Reviewed Dr. Keshia Valiente note from 7/2/21 :     XR L hip 6/29/2021: Other moderate degenerative changes bilaterally symmetric, joint space is preserved.  No fracture. XR L knee 6/29/2021: Joint spaces preserved. XR C-spine 6/3/2020: Displaced heights maintained, no fracture, facet arthropathy, instability C3-C6. XR L-spine 6/3/2020: Degenerative disc disease, facet arthropathy, no fracture. XR L knee 6/3/2020 standing films demonstrate preservation of joint spaces, no fracture  MRI R hip 3/12/2020: Small osteophytes, degenerative changes within the labrum.  Left hip unremarkable.  Normal SI joints.  Minimal degenerative changes right hip, no fracture.   MRI lumbar spine 8/6/19: No fracture.  T12-L1, L1 L2, L2 L3, L3 L4, L4-L5 normal canal and foramen.  L5-S1 mild canal stenosis, normal foramen. XR R Hip 12/26/18: hip joint spaces maintained. No fracture. EMG B LE 1/10/19: This study is limited, but normal. Although limited, there is no clear electrodiagnostic evidence for a generalized large fiber sensorimotor peripheral polyneuropathy or active lumbosacral motor radiculopathy. XR LS Spine 6/1/18: degenerative disc disease, lumbar facet arthropathy, no fracture. CT Abd Pelvis 4/14/18: stable right lower lung nodule, unchanged May 16. Hysterectomy. Remote RLQ and Left anterior abd wall surgical procedures. CT Abd Pelvis 5/23/16: lung nodules. Hepatic steatosis. Gallbladder sludge. Right kidney calcification. Non obstructing right renal calculi     UDS 4/9/9204: Free of illicits, consistent with [place]  UDS 6/3/2020: Free of substances, consistent with [place]  UDS 5/71/27: free of illicits, consistent with Norco.  Opioid risk tool score 0, low risk      Assessment:      Diagnosis Orders   1. Abdominal pain, chronic, right upper quadrant  HYDROcodone-acetaminophen (NORCO) 5-325 MG per tablet   2. Lumbosacral spondylosis without myelopathy  HYDROcodone-acetaminophen (NORCO) 5-325 MG per tablet   3. Pain in both lower extremities  HYDROcodone-acetaminophen (NORCO) 5-325 MG per tablet   4. Chronic, continuous use of opioids  HYDROcodone-acetaminophen (NORCO) 5-325 MG per tablet   5. Chronic pain of left knee  HYDROcodone-acetaminophen (NORCO) 5-325 MG per tablet   6. Encounter for monitoring opioid maintenance therapy  HYDROcodone-acetaminophen ([address]) 5-325 MG per tablet       Plan:     Periodic Controlled Substance Monitoring: Possible medication side effects, risk of tolerance/dependence & alternative treatments discussed., No signs of potential drug abuse or diversion identified. , Assessed functional status., Obtaining appropriate analgesic effect of treatment.  (Lyda Mcardle, APRN - CNP)    Orders Placed This Encounter   Medications    HYDROcodone-acetaminophen (NORCO) 5-325 MG per tablet     Sig: Take 1 tablet by mouth 3 times daily as needed for Pain for up to 30 days. Dispense:  90 tablet     Refill:  0     Reduce doses taken as pain becomes manageable       No orders of the defined types were placed in this encounter. Discussed options with the patient today. Continue Norco for chronic multijoint pain. MRI left knee next week. Options limited for abdominal pain. She will continue PT. All questions were answered. Pt verbalized understanding and agrees with above plan. Utox reviewed and appropriate from 5/3/21. Narcan sent 5/3/21.     Will continue medications for chronic pain as they help pt function with ADL and improve quality of life.  Discussed possible risks of opiate medication with pt, including but not limited to, constipation, nausea or vomiting, sedation, urinary retention, dependence and possible addiction. Pt agrees to use medication as directed. Pt advised to not use opiates while driving or operating heavy equipment, or in situations where pt may harm him/herself or others.  Pt is aware that while on narcotics, pt needs to be seen monthly to reassess pain and need for continued medication. NDP reviewed. OARRS was reviewed. This NP saw pt under direct supervision of Dr. Antonio Uriarte. Follow up:  Return in about 4 weeks (around 9/30/2021) for review meds and reassess pain.     IVÁN Farmer - JULIÁN

## 2021-09-09 ENCOUNTER — HOSPITAL ENCOUNTER (OUTPATIENT)
Dept: MRI IMAGING | Age: 56
Discharge: HOME | End: 2021-09-11
Payer: COMMERCIAL

## 2021-09-09 DIAGNOSIS — G89.29 CHRONIC PAIN OF LEFT KNEE: ICD-10-CM

## 2021-09-09 DIAGNOSIS — M25.562 CHRONIC PAIN OF LEFT KNEE: ICD-10-CM

## 2021-09-09 PROCEDURE — 73721 MRI JNT OF LWR EXTRE W/O DYE: CPT

## 2021-09-10 ENCOUNTER — HOSPITAL ENCOUNTER (OUTPATIENT)
Dept: PHYSICAL THERAPY | Age: 56
Setting detail: THERAPIES SERIES
Discharge: HOME | End: 2021-09-10
Payer: COMMERCIAL

## 2021-09-10 NOTE — PROGRESS NOTES
Therapy                            Cancellation/No-show Note      Date:  9/10/2021  Patient Name:  Fei Brown Monday  :  1965   MRN:  [id number]  Referring Practitioner: JAYSHREE Leon  Diagnosis: lumbosacral spondylosis, without myelopathy, hip pain, left    Visit Information:  PT Visit Information  PT Insurance Information: Medical [place]  Total # of Visits Approved: 60  Total # of Visits to Date: 8  No Show: 0  Canceled Appointment: 5  Progress Note Counter:  (CX on 9-10)    For today's appointment patient:  [x]  Cancelled  []  Rescheduled appointment  []  No-show   []  Called pt to remind of next appointment     Reason given by patient:  [x]  Patient ill  []  Conflicting appointment  []  No transportation    []  Conflict with work  []  No reason given  []  Other:      [x] Pt has future appointments scheduled, no follow up needed  [] Pt requests to be on hold.     Reason:   If > 2 weeks please discuss with therapist.  [] Therapist to call pt for follow up     Comments:       Signature: Electronically signed by Germán Apple PTA on 9/10/21 at 9:19 AM EDT

## 2021-09-14 ENCOUNTER — HOSPITAL ENCOUNTER (OUTPATIENT)
Dept: PHYSICAL THERAPY | Age: 56
Setting detail: THERAPIES SERIES
Discharge: HOME | End: 2021-09-14
Payer: COMMERCIAL

## 2021-09-14 PROCEDURE — 97110 THERAPEUTIC EXERCISES: CPT

## 2021-09-14 ASSESSMENT — PAIN DESCRIPTION - FREQUENCY: FREQUENCY: CONTINUOUS

## 2021-09-14 ASSESSMENT — PAIN DESCRIPTION - LOCATION: LOCATION: BACK;HIP;KNEE

## 2021-09-14 ASSESSMENT — PAIN SCALES - GENERAL: PAINLEVEL_OUTOF10: 8

## 2021-09-14 ASSESSMENT — PAIN DESCRIPTION - ORIENTATION: ORIENTATION: LEFT

## 2021-09-14 ASSESSMENT — PAIN DESCRIPTION - DESCRIPTORS: DESCRIPTORS: ACHING;SHARP;SHOOTING

## 2021-09-14 ASSESSMENT — PAIN DESCRIPTION - PROGRESSION: CLINICAL_PROGRESSION: NOT CHANGED

## 2021-09-14 NOTE — PROGRESS NOTES
[address] [address]  Outpatient Physical Therapy    Treatment Note        Date: 2021  Patient: Sharon Shipman Monday  : 1965  ACCT #: [de-identified]  Referring Practitioner: JAYSHREE Wilson  Diagnosis: lumbosacral spondylosis, without myelopathy, hip pain, left  Treatment Diagnosis: decreased posture, decreased lumbar spine ROM, decreased LE, abdominal, and trunk extensor strength, decreased tolerance for prolonged laying/seated positions and decreased tolerance for prolonged standing and amb with evidence of antalgic pattern during gait    Visit Information:  PT Visit Information  PT Insurance Information: Medical [place]  Total # of Visits Approved: 60  Total # of Visits to Date: 9  No Show: 0  Canceled Appointment: 5  Progress Note Counter: -    Subjective: Patient c/o frequent leg cramping/muscle spasms while doing exercises. Notes she has not done pool exercises x2 weeks. Continues to ambulate with moderate antalgic gait. Comments: x-ray L knee 2021: Upright view of both knees are included. Joint spaces preserved. Bony structures intact. Soft tissues unremarkable. HEP Compliance:  [x] Good [] Fair [] Poor [] Reports not doing due to:    Vital Signs  Patient Currently in Pain: Yes   Pain Screening  Patient Currently in Pain: Yes  Pain Assessment  Pain Assessment: 0-10  Pain Level: 8  Pain Location: Back;Hip;Knee  Pain Orientation: Left  Pain Descriptors: Aching; Vickii Murphy; Shooting  Pain Frequency: Continuous  Clinical Progression: Not changed    OBJECTIVE:   Exercises  Exercise 1: nustep  L3  5 min  Exercise 2: 4 way hip YTB x10 ea  Exercise 5: Seated abd march w/ RTB around knees x20  Exercise 6: hamstring stretch 3x30\" b/l w/ stool  Exercise 7: piriformis stretch 30 sec X 3 b/l- cues for pain free ROM L  Exercise 8: bridge 2 X 10  Exercise 9: STS from chair w/ TA preactivation x10 (focus on minimal to no use of UE assist)  Exercise 10: RTB lats/rows RTB 2x10 ea  Exercise 13: SLR 2 x 10 R, x 9 L  Exercise 14: clam R X 20, L x 15 b/l  Exercise 15: hip abd in s/l 2X 10 R, x15 on left  *Indicates exercise, modality, or manual techniques to be initiated when appropriate    Assessment: Body structures, Functions, Activity limitations: Decreased functional mobility , Increased pain, Decreased posture, Decreased ROM, Decreased strength, Decreased endurance  Assessment: continued current POC progressing exercises for bilateral hip strength and core stabilization. Patient continues to demonstrate poor postural awareness. C/o mild discomfort with gentle abdominal bracing. Requires frequent cuing to modify/correct exercise technique. C/o muscle spasms during session. Plans to use modalities at home post session. Treatment Diagnosis: decreased posture, decreased lumbar spine ROM, decreased LE, abdominal, and trunk extensor strength, decreased tolerance for prolonged laying/seated positions and decreased tolerance for prolonged standing and amb with evidence of antalgic pattern during gait  Goals:  Short term goals  Time Frame for Short term goals: 2 wks  Short term goal 1: Pt will demonstrate improved trunk extensor, abdominal, and B LE strength >/= 4- to 4/5 for carryover to decreased pain with prolonged amb and prolonged standing >15 min. Long term goals  Time Frame for Long term goals : 6-8 wks  Long term goal 1: Pt will demonstrate indep with % of the time for self management of low back pain and L LE pain. Long term goal 2: Pt will demonstrate improved score on LEFS >/= 60/80 indicating minimal distability for improved pt quality of life. Long term goal 3: The pt will report decreased pain </=2/10 at worst in order to increase ease with caring for grandkids and parents. Progress toward goals: continue towards all     POST-PAIN       Pain Rating (0-10 pain scale):  8 /10   Location and pain description same as pre-treatment unless indicated.    Action: [] NA   [] Perform HEP  [] Meds as prescribed  [x] Modalities as prescribed   [] Call Physician     Frequency/Duration:  Plan  Times per week: 1-2  Plan weeks: 6-8  Current Treatment Recommendations: Strengthening, Neuromuscular Re-education, Home Exercise Program, ROM, Manual Therapy - Soft Tissue Mobilization, Safety Education & Training, Aquatics, Balance Training, Endurance Training, Patient/Caregiver Education & Training, Functional Mobility Training, Equipment Evaluation, Education, & procurement, Gait Training, Modalities, Stair training, Pain Management, Positioning     Pt to continue current HEP. See objective section for any therapeutic exercise changes, additions or modifications this date.   PT Individual Minutes  Time In: 1000  Time Out: 2431  Minutes: 40  Timed Code Treatment Minutes: 40 Minutes  Procedure Minutes:0      Timed Activity Minutes Units   Ther Ex 40 3     Signature:  Electronically signed by oJssie Zaidi PTA on 9/14/21 at 11:19 AM EDT

## 2021-09-21 ENCOUNTER — HOSPITAL ENCOUNTER (OUTPATIENT)
Dept: PHYSICAL THERAPY | Age: 56
Setting detail: THERAPIES SERIES
Discharge: HOME | End: 2021-09-21
Payer: COMMERCIAL

## 2021-09-21 ASSESSMENT — PAIN DESCRIPTION - PROGRESSION: CLINICAL_PROGRESSION: NOT CHANGED

## 2021-09-21 NOTE — PROGRESS NOTES
Therapy                            Cancellation/No-show Note      Date:  2021  Patient Name:  Ernestene Paget Monday  :  1965   MRN:  [id number]  Referring Practitioner: JAYSHREE Carey  Diagnosis: lumbosacral spondylosis, without myelopathy, hip pain, left    Visit Information:  PT Visit Information  PT Insurance Information: Medical [place]  Total # of Visits Approved: 60  Total # of Visits to Date: 9  No Show: 0  Canceled Appointment: 6  Progress Note Counter: -    For today's appointment patient:  [x]  Cancelled  []  Rescheduled appointment  []  No-show   []  Called pt to remind of next appointment     Reason given by patient:  []  Patient ill  [x]  Conflicting appointment  []  No transportation    []  Conflict with work  []  No reason given  []  Other:      [x] Pt has future appointments scheduled, no follow up needed  [] Pt requests to be on hold.     Reason:   If > 2 weeks please discuss with therapist.  [] Therapist to call pt for follow up     Comments:   1 additional visit    Signature: Electronically signed by Starla Juarez PT on 21 at 10:26 AM EDT

## 2021-09-28 ENCOUNTER — HOSPITAL ENCOUNTER (OUTPATIENT)
Dept: PHYSICAL THERAPY | Age: 56
Setting detail: THERAPIES SERIES
Discharge: HOME | End: 2021-09-28
Payer: COMMERCIAL

## 2021-09-28 ASSESSMENT — PAIN DESCRIPTION - PROGRESSION: CLINICAL_PROGRESSION: NOT CHANGED

## 2021-09-28 NOTE — PROGRESS NOTES
Therapy                            Cancellation/No-show Note      Date:  2021  Patient Name:  Rafaela Lu Monday  :  1965   MRN:  [id number]  Referring Practitioner: JAYSHREE Rajput  Diagnosis: lumbosacral spondylosis, without myelopathy, hip pain, left    Visit Information:  PT Visit Information  PT Insurance Information: Medical [place]  Total # of Visits Approved: 60  Total # of Visits to Date: 9  No Show: 0  Canceled Appointment: 7  Progress Note Counter: - (cx )    For today's appointment patient:  [x]  Cancelled  []  Rescheduled appointment  []  No-show   []  Called pt to remind of next appointment     Reason given by patient:  [x]  Patient ill  []  Conflicting appointment  []  No transportation    []  Conflict with work  []  No reason given  []  Other:      [] Pt has future appointments scheduled, no follow up needed  [] Pt requests to be on hold.     Reason:   If > 2 weeks please discuss with therapist.  [x] Therapist to call pt for follow up     Comments:       Signature: Electronically signed by Gavin Sneed PTA on 21 at 10:19 AM EDT

## 2021-10-05 ENCOUNTER — OFFICE VISIT (OUTPATIENT)
Dept: PAIN MANAGEMENT | Age: 56
End: 2021-10-05
Payer: COMMERCIAL

## 2021-10-05 VITALS
SYSTOLIC BLOOD PRESSURE: 130 MMHG | WEIGHT: 156 LBS | BODY MASS INDEX: 26.63 KG/M2 | HEIGHT: 64 IN | DIASTOLIC BLOOD PRESSURE: 82 MMHG | TEMPERATURE: 97.3 F

## 2021-10-05 DIAGNOSIS — R10.11 ABDOMINAL PAIN, CHRONIC, RIGHT UPPER QUADRANT: ICD-10-CM

## 2021-10-05 DIAGNOSIS — G89.29 ABDOMINAL PAIN, CHRONIC, RIGHT UPPER QUADRANT: ICD-10-CM

## 2021-10-05 DIAGNOSIS — M47.817 LUMBOSACRAL SPONDYLOSIS WITHOUT MYELOPATHY: ICD-10-CM

## 2021-10-05 DIAGNOSIS — M25.562 CHRONIC PAIN OF LEFT KNEE: ICD-10-CM

## 2021-10-05 DIAGNOSIS — F11.90 CHRONIC, CONTINUOUS USE OF OPIOIDS: ICD-10-CM

## 2021-10-05 DIAGNOSIS — G89.29 CHRONIC PAIN OF LEFT KNEE: ICD-10-CM

## 2021-10-05 PROCEDURE — G8484 FLU IMMUNIZE NO ADMIN: HCPCS | Performed by: NURSE PRACTITIONER

## 2021-10-05 PROCEDURE — 4004F PT TOBACCO SCREEN RCVD TLK: CPT | Performed by: NURSE PRACTITIONER

## 2021-10-05 PROCEDURE — 3017F COLORECTAL CA SCREEN DOC REV: CPT | Performed by: NURSE PRACTITIONER

## 2021-10-05 PROCEDURE — 99213 OFFICE O/P EST LOW 20 MIN: CPT | Performed by: NURSE PRACTITIONER

## 2021-10-05 PROCEDURE — G8427 DOCREV CUR MEDS BY ELIG CLIN: HCPCS | Performed by: NURSE PRACTITIONER

## 2021-10-05 PROCEDURE — G8419 CALC BMI OUT NRM PARAM NOF/U: HCPCS | Performed by: NURSE PRACTITIONER

## 2021-10-05 RX ORDER — HYDROCODONE BITARTRATE AND ACETAMINOPHEN 5; 325 MG/1; MG/1
1 TABLET ORAL 3 TIMES DAILY PRN
Qty: 90 TABLET | Refills: 0 | Status: SHIPPED | OUTPATIENT
Start: 2021-10-06 | End: 2021-11-02 | Stop reason: SDUPTHER

## 2021-10-05 ASSESSMENT — ENCOUNTER SYMPTOMS
BACK PAIN: 1
SHORTNESS OF BREATH: 0
ABDOMINAL PAIN: 0
SORE THROAT: 0

## 2021-10-12 ASSESSMENT — PAIN DESCRIPTION - PROGRESSION: CLINICAL_PROGRESSION: NOT CHANGED

## 2021-10-12 NOTE — PROGRESS NOTES
[place], [address]     Ph: [phone]  Fax: [phone]    [] Certification  [] Recertification []  Plan of Care  [] Progress Note [x] Discharge      To:  IVÁN Albert-JULIÁN      From:  Pepito Martinez, PT  Patient: Anatoliy Del Cid Monday     : 1965  Diagnosis: lumbosacral spondylosis, without myelopathy, hip pain, left     Date: 10/12/2021  Treatment Diagnosis: decreased posture, decreased lumbar spine ROM, decreased LE, abdominal, and trunk extensor strength, decreased tolerance for prolonged laying/seated positions and decreased tolerance for prolonged standing and amb with evidence of antalgic pattern during gait       Progress Report Period from:  6/3/2021  to 10/12/2021    Total # of Visits to Date: 9   No Show: 0    Canceled Appointment: 7     OBJECTIVE:   Short Term Goals - Time Frame for Short term goals: 2 wks    Goals Current/Discharge status  Met   Short term goal 1: Pt will demonstrate improved trunk extensor, abdominal, and B LE strength >/= 4- to 4/5 for carryover to decreased pain with prolonged amb and prolonged standing >15 min. Goals not reassessed due to pt did not continue to attend therapy sessions [] yes  [x] no     Long Term Goals - Time Frame for Long term goals : 6-8 wks  Goals Current/ Discharge status Met   Long term goal 1: Pt will demonstrate indep with % of the time for self management of low back pain and L LE pain. Goals not reassessed due to pt did not continue to attend therapy sessions [] yes  [x] no   Long term goal 2: Pt will demonstrate improved score on LEFS >/= 60/80 indicating minimal distability for improved pt quality of life. Goals not reassessed due to pt did not continue to attend therapy sessions [] yes  [x] no   Long term goal 3: The pt will report decreased pain </=2/10 at worst in order to increase ease with caring for grandkids and parents.  Goals not

## 2021-11-02 ENCOUNTER — VIRTUAL VISIT (OUTPATIENT)
Dept: PAIN MANAGEMENT | Age: 56
End: 2021-11-02
Payer: COMMERCIAL

## 2021-11-02 DIAGNOSIS — R10.11 ABDOMINAL PAIN, CHRONIC, RIGHT UPPER QUADRANT: ICD-10-CM

## 2021-11-02 DIAGNOSIS — G89.29 ABDOMINAL PAIN, CHRONIC, RIGHT UPPER QUADRANT: ICD-10-CM

## 2021-11-02 DIAGNOSIS — M25.562 CHRONIC PAIN OF LEFT KNEE: ICD-10-CM

## 2021-11-02 DIAGNOSIS — F11.90 CHRONIC, CONTINUOUS USE OF OPIOIDS: ICD-10-CM

## 2021-11-02 DIAGNOSIS — G89.29 CHRONIC PAIN OF LEFT KNEE: ICD-10-CM

## 2021-11-02 DIAGNOSIS — M47.817 LUMBOSACRAL SPONDYLOSIS WITHOUT MYELOPATHY: ICD-10-CM

## 2021-11-02 PROBLEM — M25.551 PAIN OF RIGHT HIP JOINT: Status: ACTIVE | Noted: 2021-11-02

## 2021-11-02 PROCEDURE — 99442 PR PHYS/QHP TELEPHONE EVALUATION 11-20 MIN: CPT | Performed by: NURSE PRACTITIONER

## 2021-11-02 RX ORDER — HYDROCODONE BITARTRATE AND ACETAMINOPHEN 5; 325 MG/1; MG/1
1 TABLET ORAL 3 TIMES DAILY PRN
Qty: 90 TABLET | Refills: 0 | Status: SHIPPED | OUTPATIENT
Start: 2021-11-05 | End: 2021-12-02 | Stop reason: SDUPTHER

## 2021-11-02 ASSESSMENT — ENCOUNTER SYMPTOMS
COUGH: 0
BACK PAIN: 1
CONSTIPATION: 0
SHORTNESS OF BREATH: 0
ABDOMINAL PAIN: 1
EYES NEGATIVE: 1
DIARRHEA: 0
TROUBLE SWALLOWING: 0

## 2021-11-02 NOTE — PROGRESS NOTES
Jaimie RAGSDALE Monday  (1965)    11/2/2021    TELEHEALTH EVALUATION -- Audio/Visual ([facility])    Due to COVID 19 outbreak, patient's office visit was converted to a virtual visit. Patient was contacted and agreed to proceed with a virtual visit via audio-visual platform. Patient understands that this encounter is a billable visit and that insurance coverage and co-pays are up to their individual insurance plans. At the beginning of this telehealth encounter, I verified with the patient their name and date of birth. Verbal consent for telehealth encounter was obtained. Subjective:       Chief Complaint   Patient presents with    Knee Pain     left       Nela RAGSDALE Monday is 49 y.o. female who complains today of: Allergies:  Augmentin [amoxicillin-pot clavulanate], Dye [iodides], Macrobid [nitrofurantoin monohydrate macrocrystals], and Flagyl [metronidazole]    History:  Past Medical History:   Diagnosis Date    Anxiety     GERD (gastroesophageal reflux disease)     History of cellulitis and abscess     Irritable bowel syndrome      Past Surgical History:   Procedure Laterality Date    HERNIA REPAIR  1992, 2012    HYSTERECTOMY  2009    OTHER SURGICAL HISTORY  02/11/15    [facility]  EXCISION OF LT LOWER QUAD NONHEALING SKIN LESION    SMALL INTESTINE SURGERY  2012    bowel obstruction @ [address] [facility] [address] Garcia  [id number]    DR. MAGALLON     Family History   Problem Relation Age of Onset    Diabetes Mother     Heart Disease Mother         stints    Diabetes Sister     High Blood Pressure Sister      Social History     Socioeconomic History    Marital status:      Spouse name: Not on file    Number of children: Not on file    Years of education: Not on file    Highest education level: Not on file   Occupational History    Not on file   Tobacco Use    Smoking status: Current Some Day Smoker Packs/day: 0.25     Years: 15.00     Pack years: 3.75     Types: Cigarettes    Smokeless tobacco: Never Used    Tobacco comment: 4/day    Substance and Sexual Activity    Alcohol use: No    Drug use: No    Sexual activity: Not on file   Other Topics Concern    Not on file   Social History Narrative    Not on file     Social Determinants of Health     Financial Resource Strain:     Difficulty of Paying Living Expenses:    Food Insecurity:     Worried About Running Out of Food in the Last Year:     Ran Out of Food in the Last Year:    Transportation Needs:     Lack of Transportation (Medical):  Lack of Transportation (Non-Medical):    Physical Activity:     Days of Exercise per Week:     Minutes of Exercise per Session:    Stress:     Feeling of Stress :    Social Connections:     Frequency of Communication with Friends and Family:     Frequency of Social Gatherings with Friends and Family:     Attends Temple Services:     Active Member of Clubs or Organizations:     Attends Club or Organization Meetings:     Marital Status:    Intimate Partner Violence:     Fear of Current or Ex-Partner:     Emotionally Abused:     Physically Abused:     Sexually Abused:        Current Outpatient Medications on File Prior to Visit   Medication Sig Dispense Refill    magnesium oxide (MAG-OX) 400 (240 Mg) MG tablet Take 1 tablet by mouth daily 30 tablet 0    lidocaine (LMX) 4 % cream Apply a half dollar sized amount to intact skin topically up to twice daily as needed for pain 1 Tube 1    vitamin D (ERGOCALCIFEROL) 63038 units CAPS capsule Take 1 capsule by mouth once a week 12 capsule 1     No current facility-administered medications on file prior to visit. Pt's f/u done today with a telehealth visit for her pain d/t Covid 19 pandemic. Pt last saw Nieves Patel NP for chronic left knee, abdominal (R>L), and mid back  pain.  MRI left knee 9/9/21 + for tendinosis, no ligament or meniscus C-spine 6/3/2020: Displaced heights maintained, no fracture, facet arthropathy, instability C3-C6. XR L-spine 6/3/2020: Degenerative disc disease, facet arthropathy, no fracture. XR L knee 6/3/2020 standing films demonstrate preservation of joint spaces, no fracture  MRI R hip 3/12/2020: Small osteophytes, degenerative changes within the labrum.  Left hip unremarkable.  Normal SI joints.  Minimal degenerative changes right hip, no fracture. MRI lumbar spine 8/6/19: No fracture.  T12-L1, L1 L2, L2 L3, L3 L4, L4-L5 normal canal and foramen.  L5-S1 mild canal stenosis, normal foramen. XR R Hip 12/26/18: hip joint spaces maintained. No fracture. EMG B LE 1/10/19: This study is limited, but normal. Although limited, there is no clear electrodiagnostic evidence for a generalized large fiber sensorimotor peripheral polyneuropathy or active lumbosacral motor radiculopathy. XR LS Spine 6/1/18: degenerative disc disease, lumbar facet arthropathy, no fracture. CT Abd Pelvis 4/14/18: stable right lower lung nodule, unchanged May 16. Hysterectomy. Remote RLQ and Left anterior abd wall surgical procedures. CT Abd Pelvis 5/23/16: lung nodules. Hepatic steatosis. Gallbladder sludge. Right kidney calcification.  Non obstructing right renal calculi     UDS 5/2/3859: Free of illicits, consistent with [place]  UDS 6/3/2020: Free of substances, consistent with [place]  UDS 4/24/17: free of illicits, consistent with Norco.  Opioid risk tool score 0, low risk    Objective:     Vitals:  [facility] 01/01/2009      PHYSICAL EXAMINATION:    [x] Alert  [x] Oriented to person/place/time  [x] No apparent distress      [x] Mood and affect normal  [x] Recent and remote memory intact  [x] Judgement and insight normal     [] Breathing appears normal  [] No rash, lesions or ulcers on visible skin    [] Cranial Nerves II-XII grossly intact    [] Motor grossly intact in visible upper extremities    [] Motor grossly intact in visible lower extremities    [] Normal gait and station   [] No digital cyanosis    [] OTHER:      Due to this being a TeleHealth encounter, evaluation of the following organ systems is limited: Vitals/Constitutional/EENT/Resp/CV/GI//MS/Neuro/Skin/Heme-Lymph-Imm. Assessment:      Diagnosis Orders   1. Abdominal pain, chronic, right upper quadrant  HYDROcodone-acetaminophen (NORCO) 5-325 MG per tablet   2. Lumbosacral spondylosis without myelopathy  HYDROcodone-acetaminophen (NORCO) 5-325 MG per tablet   3. Chronic, continuous use of opioids  HYDROcodone-acetaminophen (NORCO) 5-325 MG per tablet   4. Chronic pain of left knee  HYDROcodone-acetaminophen (NORCO) 5-325 MG per tablet       Plan:     Periodic Controlled Substance Monitoring: Possible medication side effects, risk of tolerance/dependence & alternative treatments discussed., No signs of potential drug abuse or diversion identified. , Assessed functional status., Obtaining appropriate analgesic effect of treatment. (Alina Davis, APRN - CNP)    Orders Placed This Encounter   Medications    HYDROcodone-acetaminophen (NORCO) 5-325 MG per tablet     Sig: Take 1 tablet by mouth 3 times daily as needed for Pain for up to 30 days. Dispense:  90 tablet     Refill:  0     Reduce doses taken as pain becomes manageable       No orders of the defined types were placed in this encounter. Discussed options with the patient today. Telehealth visit d/t COVID-19 as noted above. She is not interested in seeing ortho for hip or any further injections. Continue Norco for chronic multijoint pain.   Options limited for abdominal pain.  Completed PT.  All questions were answered. Discussed home exercise program.  Relevant imaging and pain generators reviewed. Pt verbalized understanding and agrees with above plan. Pt has chronic pain. Utox reviewed and appropriate from May 2021. ORT score 0 - low risk.  Narcan Rx sent in May 2021    Will continue medications for chronic pain that has been previously directed as they do help pt function with ADL and improve quality of life. Pt is aware goal is to use the least amount of medication possible to allow pt to function and help with quality of life as discussed in detail. Ideal to keep MME below 50 which it is. Have discussed proper disposal of narcotic medication. Advised to have medications in safe place and locked up/in lock box. Discussed possible risks of opiate medication with pt, including, but not limited to possible constipation, nausea or vomiting, sedation, urinary retention, dependence and possible addiction. Pt agrees to use medication as prescribed/as directed. Pt advised to not use opiates while driving or operating heavy equipment, or in situations where pt may harm him/herself or others. Pt is aware that while on narcotics, pt needs to be seen to reassess pain and reassess need for continued medication at that time. NDP reviewed. OARRS was reviewed. This NP saw pt under direct supervision of Dr. Jane White. Follow up:  Return in about 4 weeks (around 11/30/2021) for review meds and reassess pain. Alina Clark, APRN - CNP      >50% of 12 minute appointment was spent with discussion, addressing questions and concerns, including prognosis, treatment options, patient education, and recommendations. [id number]    Pursuant to the emergency declaration under the [facility], [facility] waiver authority and the [employer] Act, this Virtual  Visit was conducted, with patient's consent, to reduce the patient's risk of exposure to COVID-19 and provide continuity of care for an established patient. Services were provided through a video synchronous discussion virtually to substitute for in-person clinic visit.

## 2021-12-02 ENCOUNTER — OFFICE VISIT (OUTPATIENT)
Dept: PAIN MANAGEMENT | Age: 56
End: 2021-12-02
Payer: COMMERCIAL

## 2021-12-02 VITALS
HEIGHT: 64 IN | DIASTOLIC BLOOD PRESSURE: 68 MMHG | BODY MASS INDEX: 26.63 KG/M2 | SYSTOLIC BLOOD PRESSURE: 130 MMHG | WEIGHT: 156 LBS | TEMPERATURE: 98.2 F

## 2021-12-02 DIAGNOSIS — R10.11 ABDOMINAL PAIN, CHRONIC, RIGHT UPPER QUADRANT: ICD-10-CM

## 2021-12-02 DIAGNOSIS — F11.90 CHRONIC, CONTINUOUS USE OF OPIOIDS: ICD-10-CM

## 2021-12-02 DIAGNOSIS — M25.562 CHRONIC PAIN OF LEFT KNEE: ICD-10-CM

## 2021-12-02 DIAGNOSIS — G89.29 ABDOMINAL PAIN, CHRONIC, RIGHT UPPER QUADRANT: ICD-10-CM

## 2021-12-02 DIAGNOSIS — G89.29 CHRONIC PAIN OF LEFT KNEE: ICD-10-CM

## 2021-12-02 DIAGNOSIS — M47.817 LUMBOSACRAL SPONDYLOSIS WITHOUT MYELOPATHY: Primary | ICD-10-CM

## 2021-12-02 PROCEDURE — G8419 CALC BMI OUT NRM PARAM NOF/U: HCPCS | Performed by: NURSE PRACTITIONER

## 2021-12-02 PROCEDURE — 99214 OFFICE O/P EST MOD 30 MIN: CPT | Performed by: NURSE PRACTITIONER

## 2021-12-02 PROCEDURE — 3017F COLORECTAL CA SCREEN DOC REV: CPT | Performed by: NURSE PRACTITIONER

## 2021-12-02 PROCEDURE — G8484 FLU IMMUNIZE NO ADMIN: HCPCS | Performed by: NURSE PRACTITIONER

## 2021-12-02 PROCEDURE — 4004F PT TOBACCO SCREEN RCVD TLK: CPT | Performed by: NURSE PRACTITIONER

## 2021-12-02 PROCEDURE — G8427 DOCREV CUR MEDS BY ELIG CLIN: HCPCS | Performed by: NURSE PRACTITIONER

## 2021-12-02 RX ORDER — METHYLPREDNISOLONE 4 MG/1
TABLET ORAL
Qty: 1 KIT | Refills: 0 | Status: SHIPPED | OUTPATIENT
Start: 2021-12-02 | End: 2021-12-08

## 2021-12-02 RX ORDER — HYDROCODONE BITARTRATE AND ACETAMINOPHEN 5; 325 MG/1; MG/1
1 TABLET ORAL 3 TIMES DAILY PRN
Qty: 90 TABLET | Refills: 0 | Status: SHIPPED | OUTPATIENT
Start: 2021-12-05 | End: 2021-12-30 | Stop reason: SDUPTHER

## 2021-12-02 ASSESSMENT — ENCOUNTER SYMPTOMS
BACK PAIN: 1
SORE THROAT: 0
SHORTNESS OF BREATH: 0
ABDOMINAL PAIN: 0

## 2021-12-02 NOTE — PROGRESS NOTES
Jaimie RAGSDALE Monday  (1965)    12/2/2021    Subjective:     Mayte Trevizo Monday is 49 y.o. female who complains today of:    Chief Complaint   Patient presents with    Back Pain         Allergies:  Augmentin [amoxicillin-pot clavulanate], Dye [iodides], Macrobid [nitrofurantoin monohydrate macrocrystals], and Flagyl [metronidazole]    Past Medical History:   Diagnosis Date    Anxiety     GERD (gastroesophageal reflux disease)     History of cellulitis and abscess     Irritable bowel syndrome      Past Surgical History:   Procedure Laterality Date   [address], 2012    HYSTERECTOMY  2009    OTHER SURGICAL HISTORY  02/11/15    DR [facility]  EXCISION OF LT LOWER QUAD NONHEALING SKIN LESION    SMALL INTESTINE SURGERY  2012    bowel obstruction @ [address] [place] [address] Dr. [facility]  [id number]    DR. Regan Garcia     Family History   Problem Relation Age of Onset    Diabetes Mother     Heart Disease Mother         stints    Diabetes Sister     High Blood Pressure Sister      Social History     Socioeconomic History    Marital status:      Spouse name: Not on file    Number of children: Not on file    Years of education: Not on file    Highest education level: Not on file   Occupational History    Not on file   Tobacco Use    Smoking status: Current Some Day Smoker     Packs/day: 0.25     Years: 15.00     Pack years: 3.75     Types: Cigarettes    Smokeless tobacco: Never Used    Tobacco comment: 4/day    Substance and Sexual Activity    Alcohol use: No    Drug use: No    Sexual activity: Not on file   Other Topics Concern    Not on file   Social History Narrative    Not on file     Social Determinants of Health     Financial Resource Strain:     Difficulty of Paying Living Expenses: Not on file   Food Insecurity:     Worried About Running Out of Food in the Last Year: Not on file    Denisa of Food in the Last Year: Not on file Transportation Needs:     Lack of Transportation (Medical): Not on file    Lack of Transportation (Non-Medical): Not on file   Physical Activity:     Days of Exercise per Week: Not on file    Minutes of Exercise per Session: Not on file   Stress:     Feeling of Stress : Not on file   Social Connections:     Frequency of Communication with Friends and Family: Not on file    Frequency of Social Gatherings with Friends and Family: Not on file    Attends Confucianist Services: Not on file    Active Member of [address] [place] [address] [employer] or Organizations: Not on file    Attends Club or Organization Meetings: Not on file    Marital Status: Not on file   Intimate Partner Violence:     Fear of Current or Ex-Partner: Not on file    Emotionally Abused: Not on file    Physically Abused: Not on file    Sexually Abused: Not on file   Housing Stability:     Unable to Pay for Housing in the Last Year: Not on file    Number of [place] in the Last Year: Not on file    Unstable Housing in the Last Year: Not on file       Current Outpatient Medications on File Prior to Visit   Medication Sig Dispense Refill    lidocaine (LMX) 4 % cream Apply a half dollar sized amount to intact skin topically up to twice daily as needed for pain 1 Tube 1    vitamin D (ERGOCALCIFEROL) 22472 units CAPS capsule Take 1 capsule by mouth once a week 12 capsule 1    magnesium oxide (MAG-OX) 400 (240 Mg) MG tablet Take 1 tablet by mouth daily 30 tablet 0     No current facility-administered medications on file prior to visit. Pt presents today for a f/u of chronic left knee, abdominal (R>L), and mid back  Pain. She is having a flare of low back/sciatic  Pain. MRI left knee 9/9 + for tendinosis, no ligament or meniscus tearing. She uses biofreeze which helps for several hours. She completed PT earlier this year. Pt feels pain level and functioning improves with prescribed medications and is able to do PT.  Pt feels that weather changes aggravates the pain. Pt c/o occasional generalized radiating numbness and tingling.   Abdomen pain has been a constant ache since undergoing abdominal surgery mesh surgical placement after incarcerated spigelian hernia repair on July 23, 2012 by Dr. Santoro. Was told mesh cannot be taken out due to scar tissue. She helps care for her mom.         Review of Systems   Constitutional: Negative for fever. HENT: Negative for congestion and sore throat. Respiratory: Negative for shortness of breath. Gastrointestinal: Negative for abdominal pain. Genitourinary: Negative for difficulty urinating. Musculoskeletal: Positive for back pain. Neurological: Negative for speech difficulty and headaches. Hematological: Negative for adenopathy. Psychiatric/Behavioral: Negative for agitation. All other systems reviewed and are negative. Objective:     Vitals:  /68   Temp 98.2 °F (36.8 °C)   Ht 5' 4\" (1.626 m)   Wt 156 lb (70.8 kg)   LMP 01/01/2009   BMI 26.78 kg/m² Pain Score:  10 - Worst pain ever      Physical Exam  Vitals and nursing note reviewed. Pt is alert and oriented x 3.  Recent and remote memory is intact.  Mood, judgement and affect are normal.  No signs of distress or SOB noted.  Visualized skin intact.  Sensation intact to light touch. Decreased ROM with flexion and extension of low back.  Tender with palpation to right lumbar spine with positive provacative maneuvers noted.  Negative SLR.   Pt is able to briefly heel walk and toe walk.   Strength, balance, and coordination are functional for ambulation. RLQ tender to palpation. Surgical scars noted. Left knee with decreased ROM.  Tenderness noted with palpation. Mild swelling noted with arthritic deformity noted.  Crepitus with ROM.  Gait antalgic.             Reviewed Dr. Jefferson Astorga note from 7/2/21 :     XR L hip 6/29/2021: Other moderate degenerative changes bilaterally symmetric, joint space is preserved.  No fracture.   XR L knee 6/29/2021: Joint spaces preserved. XR C-spine 6/3/2020: Displaced heights maintained, no fracture, facet arthropathy, instability C3-C6. XR L-spine 6/3/2020: Degenerative disc disease, facet arthropathy, no fracture. XR L knee 6/3/2020 standing films demonstrate preservation of joint spaces, no fracture  MRI R hip 3/12/2020: Small osteophytes, degenerative changes within the labrum.  Left hip unremarkable.  Normal SI joints.  Minimal degenerative changes right hip, no fracture. MRI lumbar spine 8/6/19: No fracture.  T12-L1, L1 L2, L2 L3, L3 L4, L4-L5 normal canal and foramen.  L5-S1 mild canal stenosis, normal foramen. XR R Hip 12/26/18: hip joint spaces maintained. No fracture. EMG B LE 1/10/19: This study is limited, but normal. Although limited, there is no clear electrodiagnostic evidence for a generalized large fiber sensorimotor peripheral polyneuropathy or active lumbosacral motor radiculopathy. XR LS Spine 6/1/18: degenerative disc disease, lumbar facet arthropathy, no fracture. CT Abd Pelvis 4/14/18: stable right lower lung nodule, unchanged May 16. Hysterectomy. Remote RLQ and Left anterior abd wall surgical procedures. CT Abd Pelvis 5/23/16: lung nodules. Hepatic steatosis. Gallbladder sludge. Right kidney calcification. Non obstructing right renal calculi     UDS 6/6/1721: Free of illicits, consistent with [place]  UDS 6/3/2020: Free of substances, consistent with [place]  UDS 8/47/94: free of illicits, consistent with Norco.  Opioid risk tool score 0, low risk      Assessment:      Diagnosis Orders   1. Lumbosacral spondylosis without myelopathy  HYDROcodone-acetaminophen (NORCO) 5-325 MG per tablet    methylPREDNISolone (MEDROL DOSEPACK) 4 MG tablet   2. Abdominal pain, chronic, right upper quadrant  HYDROcodone-acetaminophen (NORCO) 5-325 MG per tablet    methylPREDNISolone (MEDROL DOSEPACK) 4 MG tablet   3.  Chronic, continuous use of opioids  HYDROcodone-acetaminophen (NORCO) 5-325 MG per tablet    methylPREDNISolone (MEDROL DOSEPACK) 4 MG tablet   4. Chronic pain of left knee  HYDROcodone-acetaminophen (NORCO) 5-325 MG per tablet    methylPREDNISolone (MEDROL DOSEPACK) 4 MG tablet       Plan:     Periodic Controlled Substance Monitoring: Possible medication side effects, risk of tolerance/dependence & alternative treatments discussed., No signs of potential drug abuse or diversion identified. , Assessed functional status., Obtaining appropriate analgesic effect of treatment. Marcie Owens, APRN - CNP)    Orders Placed This Encounter   Medications    HYDROcodone-acetaminophen (NORCO) 5-325 MG per tablet     Sig: Take 1 tablet by mouth 3 times daily as needed for Pain for up to 30 days. Dispense:  90 tablet     Refill:  0     Reduce doses taken as pain becomes manageable    methylPREDNISolone (MEDROL DOSEPACK) 4 MG tablet     Sig: Take by mouth. Dispense:  1 kit     Refill:  0       No orders of the defined types were placed in this encounter. Discussed options with the patient today. Send medrol pack for flare of right low back/sciatic pain. Continue Norco for chronic multijoint pain. Reviewed MRI left knee, not interested in injections.  Options limited for abdominal pain.  Completed PT.  All questions were answered.  Pt verbalized understanding and agrees with above plan. Utox reviewed and appropriate from 5/3/21. Narcan sent 5/3/21.     Will continue medications for chronic pain as they help pt function with ADL and improve quality of life.  Discussed possible risks of opiate medication with pt, including but not limited to, constipation, nausea or vomiting, sedation, urinary retention, dependence and possible addiction. Pt agrees to use medication as directed.  Pt advised to not use opiates while driving or operating heavy equipment, or in situations where pt may harm him/herself or others.  Pt is aware that while on narcotics, pt needs to be seen monthly to reassess pain and need

## 2021-12-30 DIAGNOSIS — F11.90 CHRONIC, CONTINUOUS USE OF OPIOIDS: ICD-10-CM

## 2021-12-30 DIAGNOSIS — M25.562 CHRONIC PAIN OF LEFT KNEE: ICD-10-CM

## 2021-12-30 DIAGNOSIS — G89.29 CHRONIC PAIN OF LEFT KNEE: ICD-10-CM

## 2021-12-30 DIAGNOSIS — G89.29 ABDOMINAL PAIN, CHRONIC, RIGHT UPPER QUADRANT: ICD-10-CM

## 2021-12-30 DIAGNOSIS — R10.11 ABDOMINAL PAIN, CHRONIC, RIGHT UPPER QUADRANT: ICD-10-CM

## 2021-12-30 DIAGNOSIS — M47.817 LUMBOSACRAL SPONDYLOSIS WITHOUT MYELOPATHY: ICD-10-CM

## 2021-12-30 NOTE — TELEPHONE ENCOUNTER
Requested Prescriptions     Pending Prescriptions Disp Refills    HYDROcodone-acetaminophen (NORCO) 5-325 MG per tablet 15 tablet 0     Sig: Take 1 tablet by mouth 3 times daily as needed for Pain for up to 5 days.        Patient last seen on:  12/2  Date of last surgery:  n/a  Date of last refill:  12/5  Pain level:  n/a  Patient complaining of:  PT r/sed per our office, asking for refill  Future appts: 1/10

## 2022-01-03 RX ORDER — HYDROCODONE BITARTRATE AND ACETAMINOPHEN 5; 325 MG/1; MG/1
1 TABLET ORAL 3 TIMES DAILY PRN
Qty: 18 TABLET | Refills: 0 | Status: SHIPPED | OUTPATIENT
Start: 2022-01-04 | End: 2022-01-10 | Stop reason: SDUPTHER

## 2022-01-10 ENCOUNTER — OFFICE VISIT (OUTPATIENT)
Dept: PAIN MANAGEMENT | Age: 57
End: 2022-01-10
Payer: COMMERCIAL

## 2022-01-10 VITALS — HEIGHT: 63 IN | WEIGHT: 147 LBS | TEMPERATURE: 96.9 F | BODY MASS INDEX: 26.05 KG/M2

## 2022-01-10 DIAGNOSIS — F11.90 CHRONIC, CONTINUOUS USE OF OPIOIDS: ICD-10-CM

## 2022-01-10 DIAGNOSIS — M47.817 LUMBOSACRAL SPONDYLOSIS WITHOUT MYELOPATHY: ICD-10-CM

## 2022-01-10 DIAGNOSIS — M25.562 CHRONIC PAIN OF LEFT KNEE: ICD-10-CM

## 2022-01-10 DIAGNOSIS — G89.29 ABDOMINAL PAIN, CHRONIC, RIGHT UPPER QUADRANT: Primary | ICD-10-CM

## 2022-01-10 DIAGNOSIS — R10.11 ABDOMINAL PAIN, CHRONIC, RIGHT UPPER QUADRANT: Primary | ICD-10-CM

## 2022-01-10 DIAGNOSIS — G89.29 CHRONIC PAIN OF LEFT KNEE: ICD-10-CM

## 2022-01-10 PROCEDURE — 3017F COLORECTAL CA SCREEN DOC REV: CPT | Performed by: NURSE PRACTITIONER

## 2022-01-10 PROCEDURE — 4004F PT TOBACCO SCREEN RCVD TLK: CPT | Performed by: NURSE PRACTITIONER

## 2022-01-10 PROCEDURE — 99213 OFFICE O/P EST LOW 20 MIN: CPT | Performed by: NURSE PRACTITIONER

## 2022-01-10 PROCEDURE — G8484 FLU IMMUNIZE NO ADMIN: HCPCS | Performed by: NURSE PRACTITIONER

## 2022-01-10 PROCEDURE — G8427 DOCREV CUR MEDS BY ELIG CLIN: HCPCS | Performed by: NURSE PRACTITIONER

## 2022-01-10 PROCEDURE — G8419 CALC BMI OUT NRM PARAM NOF/U: HCPCS | Performed by: NURSE PRACTITIONER

## 2022-01-10 RX ORDER — HYDROCODONE BITARTRATE AND ACETAMINOPHEN 5; 325 MG/1; MG/1
1 TABLET ORAL 3 TIMES DAILY PRN
Qty: 90 TABLET | Refills: 0 | Status: SHIPPED | OUTPATIENT
Start: 2022-01-10 | End: 2022-02-08 | Stop reason: SDUPTHER

## 2022-01-10 ASSESSMENT — ENCOUNTER SYMPTOMS
SHORTNESS OF BREATH: 0
ABDOMINAL PAIN: 0
SORE THROAT: 0
BACK PAIN: 1

## 2022-01-10 NOTE — PROGRESS NOTES
Last Year: Not on file   Transportation Needs:     Lack of Transportation (Medical): Not on file    Lack of Transportation (Non-Medical): Not on file   Physical Activity:     Days of Exercise per Week: Not on file    Minutes of Exercise per Session: Not on file   Stress:     Feeling of Stress : Not on file   Social Connections:     Frequency of Communication with Friends and Family: Not on file    Frequency of Social Gatherings with Friends and Family: Not on file    Attends Temple Services: Not on file    Active Member of [address] [place] [address] [employer] or Organizations: Not on file    Attends Club or Organization Meetings: Not on file    Marital Status: Not on file   Intimate Partner Violence:     Fear of Current or Ex-Partner: Not on file    Emotionally Abused: Not on file    Physically Abused: Not on file    Sexually Abused: Not on file   Housing Stability:     Unable to Pay for Housing in the Last Year: Not on file    Number of [place] in the Last Year: Not on file    Unstable Housing in the Last Year: Not on file       Current Outpatient Medications on File Prior to Visit   Medication Sig Dispense Refill    magnesium oxide (MAG-OX) 400 (240 Mg) MG tablet Take 1 tablet by mouth daily 30 tablet 0    lidocaine (LMX) 4 % cream Apply a half dollar sized amount to intact skin topically up to twice daily as needed for pain 1 Tube 1    vitamin D (ERGOCALCIFEROL) 51561 units CAPS capsule Take 1 capsule by mouth once a week 12 capsule 1     No current facility-administered medications on file prior to visit. Pt presents today for a f/u of chronic left knee, abdominal (R>L), and mid back pain. Medrol pack helped last month and feeling better. She started taking some Vitamin D. MRI left knee 9/9 + for tendinosis, no ligament or meniscus tearing. She uses biofreeze which helps for several hours. She completed PT last  year.   Pt feels pain level and functioning improves with prescribed medications and is able to do PT. Pt feels that weather changes aggravates the pain. Pt c/o occasional generalized radiating numbness and tingling.   Abdomen pain has been a constant ache since undergoing abdominal surgery mesh surgical placement after incarcerated spigelian hernia repair on July 23, 2012 by Dr. Santoro. Was told mesh cannot be taken out due to scar tissue. She helps care for her mom. Says she was told in the past she has fibromyalgia.         Review of Systems   Constitutional: Negative for fever. HENT: Negative for congestion and sore throat. Respiratory: Negative for shortness of breath. Gastrointestinal: Negative for abdominal pain. Genitourinary: Negative for difficulty urinating. Musculoskeletal: Positive for back pain. Neurological: Negative for speech difficulty and headaches. Hematological: Negative for adenopathy. Psychiatric/Behavioral: Negative for agitation. All other systems reviewed and are negative. Objective:     Vitals:  Temp 96.9 °F (36.1 °C)   Ht 5' 3\" (1.6 m)   Wt 147 lb (66.7 kg)   LMP 01/01/2009   BMI 26.04 kg/m² Pain Score:   4      Physical Exam  Vitals and nursing note reviewed.           Pt is alert and oriented x 3.  Recent and remote memory is intact.  Mood, judgement and affect are normal.  No signs of distress or SOB noted.  Visualized skin intact.  Sensation intact to light touch. Decreased ROM with flexion and extension of low back.  Tender with palpation to right lumbar spine with positive provacative maneuvers noted.  Negative SLR.   Pt is able to briefly heel walk and toe walk.   Strength, balance, and coordination are functional for ambulation. RLQ tender to palpation. Surgical scars noted. Left knee with decreased ROM.  Tenderness noted with palpation.  Mild swelling noted with arthritic deformity noted.  Crepitus with ROM.  Gait antalgic.             Reviewed Dr. Martinez Apt note from 7/2/21 :     XR L hip 6/29/2021: Other moderate degenerative changes bilaterally symmetric, joint space is preserved.  No fracture. XR L knee 6/29/2021: Joint spaces preserved. XR C-spine 6/3/2020: Displaced heights maintained, no fracture, facet arthropathy, instability C3-C6. XR L-spine 6/3/2020: Degenerative disc disease, facet arthropathy, no fracture. XR L knee 6/3/2020 standing films demonstrate preservation of joint spaces, no fracture  MRI R hip 3/12/2020: Small osteophytes, degenerative changes within the labrum.  Left hip unremarkable.  Normal SI joints.  Minimal degenerative changes right hip, no fracture. MRI lumbar spine 8/6/19: No fracture.  T12-L1, L1 L2, L2 L3, L3 L4, L4-L5 normal canal and foramen.  L5-S1 mild canal stenosis, normal foramen. XR R Hip 12/26/18: hip joint spaces maintained. No fracture. EMG B LE 1/10/19: This study is limited, but normal. Although limited, there is no clear electrodiagnostic evidence for a generalized large fiber sensorimotor peripheral polyneuropathy or active lumbosacral motor radiculopathy. XR LS Spine 6/1/18: degenerative disc disease, lumbar facet arthropathy, no fracture. CT Abd Pelvis 4/14/18: stable right lower lung nodule, unchanged May 16. Hysterectomy. Remote RLQ and Left anterior abd wall surgical procedures. CT Abd Pelvis 5/23/16: lung nodules. Hepatic steatosis. Gallbladder sludge. Right kidney calcification. Non obstructing right renal calculi     UDS 8/7/6531: Free of illicits, consistent with [place]  UDS 6/3/2020: Free of substances, consistent with [place]  UDS 3/22/49: free of illicits, consistent with [place].  Opioid risk tool score 0, low risk      Assessment:      Diagnosis Orders   1. Abdominal pain, chronic, right upper quadrant  HYDROcodone-acetaminophen (NORCO) 5-325 MG per tablet   2. Lumbosacral spondylosis without myelopathy  HYDROcodone-acetaminophen (NORCO) 5-325 MG per tablet   3. Chronic, continuous use of opioids  HYDROcodone-acetaminophen (NORCO) 5-325 MG per tablet   4.  Chronic pain of left knee  HYDROcodone-acetaminophen (NORCO) 5-325 MG per tablet       Plan:     Periodic Controlled Substance Monitoring: Possible medication side effects, risk of tolerance/dependence & alternative treatments discussed. ,No signs of potential drug abuse or diversion identified. ,Assessed functional status. ,Obtaining appropriate analgesic effect of treatment. Christopher Owens, APRN - CNP)    Orders Placed This Encounter   Medications    HYDROcodone-acetaminophen (NORCO) 5-325 MG per tablet     Sig: Take 1 tablet by mouth 3 times daily as needed for Pain for up to 30 days. Dispense:  90 tablet     Refill:  0     Reduce doses taken as pain becomes manageable       No orders of the defined types were placed in this encounter. Discussed options with the patient today. Continue Norco for chronic multijoint pain. Reviewed MRI left knee, not interested in injections.  Options limited for abdominal pain.  Completed PT.  All questions were answered.  Pt verbalized understanding and agrees with above plan. Utox reviewed and appropriate from 5/3/21. Narcan sent 5/3/21.     Will continue medications for chronic pain as they help pt function with ADL and improve quality of life.  Discussed possible risks of opiate medication with pt, including but not limited to, constipation, nausea or vomiting, sedation, urinary retention, dependence and possible addiction. Pt agrees to use medication as directed. Pt advised to not use opiates while driving or operating heavy equipment, or in situations where pt may harm him/herself or others.  Pt is aware that while on narcotics, pt needs to be seen monthly to reassess pain and need for continued medication.  NDP reviewed. Fernando Mcgovern was reviewed.  This NP saw pt under direct supervision of Dr. Dustin Simons    Follow up:  Return in about 4 weeks (around 2/7/2022) for review meds and reassess pain.     Christal Watt, APRN - CNP

## 2022-02-08 ENCOUNTER — OFFICE VISIT (OUTPATIENT)
Dept: PAIN MANAGEMENT | Age: 57
End: 2022-02-08
Payer: COMMERCIAL

## 2022-02-08 VITALS
BODY MASS INDEX: 26.22 KG/M2 | OXYGEN SATURATION: 96 % | HEART RATE: 97 BPM | WEIGHT: 148 LBS | TEMPERATURE: 97 F | HEIGHT: 63 IN

## 2022-02-08 DIAGNOSIS — F11.90 CHRONIC, CONTINUOUS USE OF OPIOIDS: ICD-10-CM

## 2022-02-08 DIAGNOSIS — M25.562 CHRONIC PAIN OF LEFT KNEE: ICD-10-CM

## 2022-02-08 DIAGNOSIS — G89.29 ABDOMINAL PAIN, CHRONIC, RIGHT UPPER QUADRANT: ICD-10-CM

## 2022-02-08 DIAGNOSIS — R10.11 ABDOMINAL PAIN, CHRONIC, RIGHT UPPER QUADRANT: ICD-10-CM

## 2022-02-08 DIAGNOSIS — G89.29 CHRONIC PAIN OF LEFT KNEE: ICD-10-CM

## 2022-02-08 DIAGNOSIS — M47.817 LUMBOSACRAL SPONDYLOSIS WITHOUT MYELOPATHY: Primary | ICD-10-CM

## 2022-02-08 PROCEDURE — 99213 OFFICE O/P EST LOW 20 MIN: CPT | Performed by: NURSE PRACTITIONER

## 2022-02-08 PROCEDURE — 4004F PT TOBACCO SCREEN RCVD TLK: CPT | Performed by: NURSE PRACTITIONER

## 2022-02-08 PROCEDURE — 3017F COLORECTAL CA SCREEN DOC REV: CPT | Performed by: NURSE PRACTITIONER

## 2022-02-08 PROCEDURE — G8427 DOCREV CUR MEDS BY ELIG CLIN: HCPCS | Performed by: NURSE PRACTITIONER

## 2022-02-08 PROCEDURE — G8419 CALC BMI OUT NRM PARAM NOF/U: HCPCS | Performed by: NURSE PRACTITIONER

## 2022-02-08 PROCEDURE — G8484 FLU IMMUNIZE NO ADMIN: HCPCS | Performed by: NURSE PRACTITIONER

## 2022-02-08 RX ORDER — HYDROCODONE BITARTRATE AND ACETAMINOPHEN 5; 325 MG/1; MG/1
1 TABLET ORAL 3 TIMES DAILY PRN
Qty: 90 TABLET | Refills: 0 | Status: SHIPPED | OUTPATIENT
Start: 2022-02-09 | End: 2022-03-07 | Stop reason: SDUPTHER

## 2022-02-08 ASSESSMENT — ENCOUNTER SYMPTOMS
BACK PAIN: 1
SORE THROAT: 0
ABDOMINAL PAIN: 0
SHORTNESS OF BREATH: 0

## 2022-02-08 NOTE — PROGRESS NOTES
Transportation Needs:     Lack of Transportation (Medical): Not on file    Lack of Transportation (Non-Medical): Not on file   Physical Activity:     Days of Exercise per Week: Not on file    Minutes of Exercise per Session: Not on file   Stress:     Feeling of Stress : Not on file   Social Connections:     Frequency of Communication with Friends and Family: Not on file    Frequency of Social Gatherings with Friends and Family: Not on file    Attends Buddhism Services: Not on file    Active Member of [address] [place] [address] [employer] or Organizations: Not on file    Attends Club or Organization Meetings: Not on file    Marital Status: Not on file   Intimate Partner Violence:     Fear of Current or Ex-Partner: Not on file    Emotionally Abused: Not on file    Physically Abused: Not on file    Sexually Abused: Not on file   Housing Stability:     Unable to Pay for Housing in the Last Year: Not on file    Number of [place] in the Last Year: Not on file    Unstable Housing in the Last Year: Not on file       Current Outpatient Medications on File Prior to Visit   Medication Sig Dispense Refill    magnesium oxide (MAG-OX) 400 (240 Mg) MG tablet Take 1 tablet by mouth daily 30 tablet 0    lidocaine (LMX) 4 % cream Apply a half dollar sized amount to intact skin topically up to twice daily as needed for pain 1 Tube 1    vitamin D (ERGOCALCIFEROL) 64023 units CAPS capsule Take 1 capsule by mouth once a week 12 capsule 1     No current facility-administered medications on file prior to visit. Pt presents today for a f/u of chronic left knee, abdominal (R>L), and mid back pain. She started taking some B12 and other vitamins. MRI left knee 9/9 + for tendinosis, no ligament or meniscus tearing. She uses biofreeze which helps for several hours. Pt feels pain level and functioning improves with prescribed medications and is able to do PT. Pt feels that weather changes aggravates the pain.   Pt c/o occasional generalized radiating numbness and tingling.   Abdomen pain has been a constant ache since undergoing abdominal surgery mesh surgical placement after incarcerated spigelian hernia repair on July 23, 2012 by Dr. Santoro. Was told mesh cannot be taken out due to scar tissue. She helps care for her mom. Says she was told in the past she has fibromyalgia.         Review of Systems   Constitutional: Negative for fever. HENT: Negative for congestion and sore throat. Respiratory: Negative for shortness of breath. Gastrointestinal: Negative for abdominal pain. Genitourinary: Negative for difficulty urinating. Musculoskeletal: Positive for back pain. Neurological: Negative for speech difficulty and headaches. Hematological: Negative for adenopathy. Psychiatric/Behavioral: Negative for agitation. All other systems reviewed and are negative. Objective:     Vitals:  Pulse 97   Temp 97 °F (36.1 °C)   Ht 5' 3\" (1.6 m)   Wt 148 lb (67.1 kg)   LMP 01/01/2009   SpO2 96%   BMI 26.22 kg/m²        Physical Exam  Vitals and nursing note reviewed.          Pt is alert and oriented x 3.  Recent and remote memory is intact.  Mood, judgement and affect are normal.  No signs of distress or SOB noted.  Visualized skin intact.  Sensation intact to light touch. Decreased ROM with flexion and extension of low back.  Tender with palpation to right lumbar spine with positive provacative maneuvers noted.  Negative SLR.   Pt is able to briefly heel walk and toe walk.   Strength, balance, and coordination are functional for ambulation. RLQ tender to palpation. Surgical scars noted. Left knee with decreased ROM.  Tenderness noted with palpation. Mild swelling noted with arthritic deformity noted.  Crepitus with ROM.  Gait antalgic.             Reviewed Dr. Yovany Mejia note from 7/2/21 :     XR L hip 6/29/2021: Other moderate degenerative changes bilaterally symmetric, joint space is preserved.  No fracture.   XR L knee 6/29/2021: Joint spaces preserved. XR C-spine 6/3/2020: Displaced heights maintained, no fracture, facet arthropathy, instability C3-C6. XR L-spine 6/3/2020: Degenerative disc disease, facet arthropathy, no fracture. XR L knee 6/3/2020 standing films demonstrate preservation of joint spaces, no fracture  MRI R hip 3/12/2020: Small osteophytes, degenerative changes within the labrum.  Left hip unremarkable.  Normal SI joints.  Minimal degenerative changes right hip, no fracture. MRI lumbar spine 8/6/19: No fracture.  T12-L1, L1 L2, L2 L3, L3 L4, L4-L5 normal canal and foramen.  L5-S1 mild canal stenosis, normal foramen. XR R Hip 12/26/18: hip joint spaces maintained. No fracture. EMG B LE 1/10/19: This study is limited, but normal. Although limited, there is no clear electrodiagnostic evidence for a generalized large fiber sensorimotor peripheral polyneuropathy or active lumbosacral motor radiculopathy. XR LS Spine 6/1/18: degenerative disc disease, lumbar facet arthropathy, no fracture. CT Abd Pelvis 4/14/18: stable right lower lung nodule, unchanged May 16. Hysterectomy. Remote RLQ and Left anterior abd wall surgical procedures. CT Abd Pelvis 5/23/16: lung nodules. Hepatic steatosis. Gallbladder sludge. Right kidney calcification. Non obstructing right renal calculi     UDS 1/4/8419: Free of illicits, consistent with [place]  UDS 6/3/2020: Free of substances, consistent with [place]  UDS 9/96/73: free of illicits, consistent with Norco.  Opioid risk tool score 0, low risk      Assessment:      Diagnosis Orders   1. Lumbosacral spondylosis without myelopathy  HYDROcodone-acetaminophen (NORCO) 5-325 MG per tablet   2. Abdominal pain, chronic, right upper quadrant  HYDROcodone-acetaminophen (NORCO) 5-325 MG per tablet   3. Chronic, continuous use of opioids  HYDROcodone-acetaminophen (NORCO) 5-325 MG per tablet   4.  Chronic pain of left knee  HYDROcodone-acetaminophen (NORCO) 5-325 MG per tablet       Plan: Periodic Controlled Substance Monitoring: Possible medication side effects, risk of tolerance/dependence & alternative treatments discussed. ,No signs of potential drug abuse or diversion identified. ,Assessed functional status. ,Obtaining appropriate analgesic effect of treatment. IVÁN Stanley    Orders Placed This Encounter   Medications    HYDROcodone-acetaminophen (NORCO) 5-325 MG per tablet     Sig: Take 1 tablet by mouth 3 times daily as needed for Pain for up to 30 days. Dispense:  90 tablet     Refill:  0     Reduce doses taken as pain becomes manageable       No orders of the defined types were placed in this encounter. Discussed options with the patient today. Recommend she discuss her vitamin regimen with her PCP. Continue Norco for chronic multijoint pain. Reviewed MRI left knee, not interested in injections.  Options limited for abdominal pain.  Completed PT.  All questions were answered.  Pt verbalized understanding and agrees with above plan. Utox reviewed and appropriate from 5/3/21. Narcan sent 5/3/21. After the visit the patient provided a very small urine specimen, then left in the office unannounced. The quantity is not sufficient to send for a sample.     Will continue medications for chronic pain as they help pt function with ADL and improve quality of life.  Discussed possible risks of opiate medication with pt, including but not limited to, constipation, nausea or vomiting, sedation, urinary retention, dependence and possible addiction. Pt agrees to use medication as directed.  Pt advised to not use opiates while driving or operating heavy equipment, or in situations where pt may harm him/herself or others.  Pt is aware that while on narcotics, pt needs to be seen monthly to reassess pain and need for continued medication.  NDP reviewed. Aaron Olson was reviewed.  This NP saw pt under direct supervision of Dr. Gena Sargent    Follow up:  Return in about 4 weeks (around 3/8/2022) for review meds and reassess pain.     Aubrie Plata, APRN - CNP

## 2022-03-07 ENCOUNTER — OFFICE VISIT (OUTPATIENT)
Dept: PAIN MANAGEMENT | Age: 57
End: 2022-03-07
Payer: COMMERCIAL

## 2022-03-07 VITALS
BODY MASS INDEX: 25.27 KG/M2 | WEIGHT: 148 LBS | SYSTOLIC BLOOD PRESSURE: 126 MMHG | TEMPERATURE: 97.3 F | DIASTOLIC BLOOD PRESSURE: 64 MMHG | HEIGHT: 64 IN

## 2022-03-07 DIAGNOSIS — M25.562 CHRONIC PAIN OF LEFT KNEE: ICD-10-CM

## 2022-03-07 DIAGNOSIS — R10.11 ABDOMINAL PAIN, CHRONIC, RIGHT UPPER QUADRANT: ICD-10-CM

## 2022-03-07 DIAGNOSIS — G89.29 ABDOMINAL PAIN, CHRONIC, RIGHT UPPER QUADRANT: ICD-10-CM

## 2022-03-07 DIAGNOSIS — M47.817 LUMBOSACRAL SPONDYLOSIS WITHOUT MYELOPATHY: ICD-10-CM

## 2022-03-07 DIAGNOSIS — F11.90 CHRONIC, CONTINUOUS USE OF OPIOIDS: ICD-10-CM

## 2022-03-07 DIAGNOSIS — G89.29 CHRONIC PAIN OF LEFT KNEE: ICD-10-CM

## 2022-03-07 PROCEDURE — 4004F PT TOBACCO SCREEN RCVD TLK: CPT | Performed by: NURSE PRACTITIONER

## 2022-03-07 PROCEDURE — 3017F COLORECTAL CA SCREEN DOC REV: CPT | Performed by: NURSE PRACTITIONER

## 2022-03-07 PROCEDURE — G8484 FLU IMMUNIZE NO ADMIN: HCPCS | Performed by: NURSE PRACTITIONER

## 2022-03-07 PROCEDURE — G8419 CALC BMI OUT NRM PARAM NOF/U: HCPCS | Performed by: NURSE PRACTITIONER

## 2022-03-07 PROCEDURE — G8427 DOCREV CUR MEDS BY ELIG CLIN: HCPCS | Performed by: NURSE PRACTITIONER

## 2022-03-07 PROCEDURE — 99214 OFFICE O/P EST MOD 30 MIN: CPT | Performed by: NURSE PRACTITIONER

## 2022-03-07 RX ORDER — HYDROCODONE BITARTRATE AND ACETAMINOPHEN 5; 325 MG/1; MG/1
1 TABLET ORAL 3 TIMES DAILY PRN
Qty: 90 TABLET | Refills: 0 | Status: SHIPPED | OUTPATIENT
Start: 2022-03-11 | End: 2022-04-07 | Stop reason: SDUPTHER

## 2022-03-07 ASSESSMENT — ENCOUNTER SYMPTOMS
BACK PAIN: 1
SHORTNESS OF BREATH: 0
SORE THROAT: 0
ABDOMINAL PAIN: 0

## 2022-03-07 NOTE — PROGRESS NOTES
Jaimie R Monday  (1965)    3/7/2022    Subjective:     Allyn Miller Monday is 49 y.o. female who complains today of:    Chief Complaint   Patient presents with    Back Pain     Lumbar    Other     Abdominal pain          Allergies:  Augmentin [amoxicillin-pot clavulanate], Dye [iodides], Macrobid [nitrofurantoin monohydrate macrocrystals], and Flagyl [metronidazole]    Past Medical History:   Diagnosis Date    Anxiety     GERD (gastroesophageal reflux disease)     History of cellulitis and abscess     Irritable bowel syndrome      Past Surgical History:   Procedure Laterality Date   [address], 2012    HYSTERECTOMY  2009    OTHER SURGICAL HISTORY  02/11/15    DR Ugarte  EXCISION OF LT LOWER QUAD NONHEALING SKIN LESION    SMALL INTESTINE SURGERY  2012    bowel obstruction @ [address] [facility] [address]  [id number]    DR. Hanh Hernandez     Family History   Problem Relation Age of Onset    Diabetes Mother     Heart Disease Mother         stints    Diabetes Sister     High Blood Pressure Sister      Social History     Socioeconomic History    Marital status:      Spouse name: Not on file    Number of children: Not on file    Years of education: Not on file    Highest education level: Not on file   Occupational History    Not on file   Tobacco Use    Smoking status: Current Some Day Smoker     Packs/day: 0.25     Years: 15.00     Pack years: 3.75     Types: Cigarettes    Smokeless tobacco: Never Used    Tobacco comment: 4/day    Substance and Sexual Activity    Alcohol use: No    Drug use: No    Sexual activity: Not on file   Other Topics Concern    Not on file   Social History Narrative    Not on file     Social Determinants of Health     Financial Resource Strain:     Difficulty of Paying Living Expenses: Not on file   Food Insecurity:     Worried About Running Out of Food in the Last Year: Not on file    [facility] in the Last Year: Not on file   Transportation Needs:     Lack of Transportation (Medical): Not on file    Lack of Transportation (Non-Medical): Not on file   Physical Activity:     Days of Exercise per Week: Not on file    Minutes of Exercise per Session: Not on file   Stress:     Feeling of Stress : Not on file   Social Connections:     Frequency of Communication with Friends and Family: Not on file    Frequency of Social Gatherings with Friends and Family: Not on file    Attends Roman Catholic Services: Not on file    Active Member of [address] [place] [address] [employer] or Organizations: Not on file    Attends Club or Organization Meetings: Not on file    Marital Status: Not on file   Intimate Partner Violence:     Fear of Current or Ex-Partner: Not on file    Emotionally Abused: Not on file    Physically Abused: Not on file    Sexually Abused: Not on file   Housing Stability:     Unable to Pay for Housing in the Last Year: Not on file    Number of [place] in the Last Year: Not on file    Unstable Housing in the Last Year: Not on file       Current Outpatient Medications on File Prior to Visit   Medication Sig Dispense Refill    magnesium oxide (MAG-OX) 400 (240 Mg) MG tablet Take 1 tablet by mouth daily 30 tablet 0    lidocaine (LMX) 4 % cream Apply a half dollar sized amount to intact skin topically up to twice daily as needed for pain 1 Tube 1    vitamin D (ERGOCALCIFEROL) 53773 units CAPS capsule Take 1 capsule by mouth once a week 12 capsule 1     No current facility-administered medications on file prior to visit. Pt presents today for a f/u of chronic left knee, abdominal (R>L), and mid back pain. Feels the weather is making things worse. She started taking some B12 and other vitamins. MRI left knee 9/9 + for tendinosis, no ligament or meniscus tearing. She uses biofreeze which helps for several hours. Pt feels pain level and functioning improves with prescribed medications and is able to do PT.  Pt feels that weather changes aggravates the pain. Pt c/o occasional generalized radiating numbness and tingling.   Abdomen pain has been a constant ache since undergoing abdominal surgery mesh surgical placement after incarcerated spigelian hernia repair on July 23, 2012 by Dr. Dhruv Kahn. Was told mesh cannot be taken out due to scar tissue. She helps care for her mom. Says she was told in the past she has fibromyalgia.         Review of Systems   Constitutional: Negative for fever. HENT: Negative for congestion and sore throat. Respiratory: Negative for shortness of breath. Gastrointestinal: Negative for abdominal pain. Genitourinary: Negative for difficulty urinating. Musculoskeletal: Positive for back pain. Neurological: Negative for speech difficulty and headaches. Hematological: Negative for adenopathy. Psychiatric/Behavioral: Negative for agitation. All other systems reviewed and are negative. Objective:     Vitals:  /64   Temp 97.3 °F (36.3 °C) (Infrared)   Ht 5' 4\" (1.626 m)   Wt 148 lb (67.1 kg)   LMP 01/01/2009   BMI 25.40 kg/m² Pain Score:   8      Physical Exam  Vitals and nursing note reviewed.          Pt is alert and oriented x 3.  Recent and remote memory is intact.  Mood, judgement and affect are normal.  No signs of distress or SOB noted.  Visualized skin intact.  Sensation intact to light touch. Decreased ROM with flexion and extension of low back.  Tender with palpation to right lumbar spine with positive provacative maneuvers noted.  Negative SLR.   Pt is able to briefly heel walk and toe walk.   Strength, balance, and coordination are functional for ambulation. RLQ tender to palpation. Surgical scars noted. Left knee with decreased ROM.  Tenderness noted with palpation. Mild swelling noted with arthritic deformity noted.  Crepitus with ROM.  Gait antalgic.        Assessment:      Diagnosis Orders   1.  Abdominal pain, chronic, right upper quadrant  Urine Drug Screen HYDROcodone-acetaminophen (NORCO) 5-325 MG per tablet   2. Lumbosacral spondylosis without myelopathy  Urine Drug Screen    HYDROcodone-acetaminophen (NORCO) 5-325 MG per tablet   3. Chronic, continuous use of opioids  Urine Drug Screen    HYDROcodone-acetaminophen (NORCO) 5-325 MG per tablet   4. Chronic pain of left knee  Urine Drug Screen    HYDROcodone-acetaminophen (NORCO) 5-325 MG per tablet       Plan:     Periodic Controlled Substance Monitoring: Possible medication side effects, risk of tolerance/dependence & alternative treatments discussed. ,No signs of potential drug abuse or diversion identified. ,Assessed functional status. ,Obtaining appropriate analgesic effect of treatment. ,Random urine drug screen sent today. Cuong Owens, APRN - CNP)    Orders Placed This Encounter   Medications    HYDROcodone-acetaminophen (NORCO) 5-325 MG per tablet     Sig: Take 1 tablet by mouth 3 times daily as needed for Pain for up to 30 days. Dispense:  90 tablet     Refill:  0     Reduce doses taken as pain becomes manageable       Orders Placed This Encounter   Procedures    Urine Drug Screen     Chronic pain/illicits     Discussed options with the patient today. No changes in chronic pain. Continue Norco for chronic multijoint pain. Routine UDS, took 1 Norco today and 3.5 yesterday. Last month UDS sample was QNS.  Options limited for abdominal pain.  Completed PT.  All questions were answered.  Pt verbalized understanding and agrees with above plan. Utox reviewed and appropriate from 5/3/21. Narcan sent 5/3/21.          Will continue medications for chronic pain as they help pt function with ADL and improve quality of life.  Discussed possible risks of opiate medication with pt, including but not limited to, constipation, nausea or vomiting, sedation, urinary retention, dependence and possible addiction. Pt agrees to use medication as directed.  Pt advised to not use opiates while driving or operating heavy equipment, or in situations where pt may harm him/herself or others.  Pt is aware that while on narcotics, pt needs to be seen monthly to reassess pain and need for continued medication.  NDP reviewed. Martínez Pandya was reviewed.  This NP saw pt under direct supervision of Dr. Rafia Bailey    Follow up:  Return in about 4 weeks (around 4/4/2022) for review meds and reassess pain.     Swati Cohen, APRN - CNP

## 2022-03-09 LAB
6-ACETYLMORPHINE, UR: NORMAL
AMPHETAMINE SCREEN, URINE: NORMAL
BARBITURATE SCREEN, URINE: NORMAL
BENZODIAZEPINE SCREEN, URINE: NORMAL
CANNABINOID SCREEN URINE: NORMAL
COCAINE METABOLITE, URINE: NORMAL
CREATININE URINE: NORMAL
EDDP, URINE: NORMAL
ETHANOL URINE: NORMAL
MDMA URINE: NORMAL
METHADONE SCREEN, URINE: NORMAL
METHAMPHETAMINE, URINE: NORMAL
OPIATES, URINE: NORMAL
OXYCODONE: NORMAL
PCP: NORMAL
PH, URINE: NORMAL
PHENCYCLIDINE, URINE: NORMAL
PROPOXYPHENE, URINE: NORMAL
TRICYCLIC ANTIDEPRESSANTS, UR: NORMAL

## 2022-04-07 ENCOUNTER — OFFICE VISIT (OUTPATIENT)
Dept: PAIN MANAGEMENT | Age: 57
End: 2022-04-07
Payer: COMMERCIAL

## 2022-04-07 VITALS — TEMPERATURE: 97.1 F | HEIGHT: 64 IN | BODY MASS INDEX: 25.1 KG/M2 | WEIGHT: 147 LBS

## 2022-04-07 DIAGNOSIS — G89.29 CHRONIC PAIN OF LEFT KNEE: ICD-10-CM

## 2022-04-07 DIAGNOSIS — M25.562 CHRONIC PAIN OF LEFT KNEE: ICD-10-CM

## 2022-04-07 DIAGNOSIS — M47.817 LUMBOSACRAL SPONDYLOSIS WITHOUT MYELOPATHY: ICD-10-CM

## 2022-04-07 DIAGNOSIS — G89.29 ABDOMINAL PAIN, CHRONIC, RIGHT UPPER QUADRANT: ICD-10-CM

## 2022-04-07 DIAGNOSIS — F11.90 CHRONIC, CONTINUOUS USE OF OPIOIDS: ICD-10-CM

## 2022-04-07 DIAGNOSIS — R10.11 ABDOMINAL PAIN, CHRONIC, RIGHT UPPER QUADRANT: ICD-10-CM

## 2022-04-07 PROCEDURE — 4004F PT TOBACCO SCREEN RCVD TLK: CPT | Performed by: NURSE PRACTITIONER

## 2022-04-07 PROCEDURE — G8427 DOCREV CUR MEDS BY ELIG CLIN: HCPCS | Performed by: NURSE PRACTITIONER

## 2022-04-07 PROCEDURE — G8419 CALC BMI OUT NRM PARAM NOF/U: HCPCS | Performed by: NURSE PRACTITIONER

## 2022-04-07 PROCEDURE — 3017F COLORECTAL CA SCREEN DOC REV: CPT | Performed by: NURSE PRACTITIONER

## 2022-04-07 PROCEDURE — 99213 OFFICE O/P EST LOW 20 MIN: CPT | Performed by: NURSE PRACTITIONER

## 2022-04-07 RX ORDER — HYDROCODONE BITARTRATE AND ACETAMINOPHEN 5; 325 MG/1; MG/1
1 TABLET ORAL 3 TIMES DAILY PRN
Qty: 90 TABLET | Refills: 0 | Status: SHIPPED | OUTPATIENT
Start: 2022-04-10 | End: 2022-05-10 | Stop reason: SDUPTHER

## 2022-04-07 ASSESSMENT — ENCOUNTER SYMPTOMS
BACK PAIN: 1
ABDOMINAL PAIN: 0
SHORTNESS OF BREATH: 0
SORE THROAT: 0

## 2022-04-07 NOTE — PROGRESS NOTES
Jaimie RAGSDALE Monday  (1965)    4/7/2022    Subjective:     Ernestene Paget Monday is 49 y.o. female who complains today of:    Chief Complaint   Patient presents with    Back Pain         Allergies:  Augmentin [amoxicillin-pot clavulanate], Dye [iodides], Macrobid [nitrofurantoin monohydrate macrocrystals], and Flagyl [metronidazole]    Past Medical History:   Diagnosis Date    Anxiety     GERD (gastroesophageal reflux disease)     History of cellulitis and abscess     Irritable bowel syndrome      Past Surgical History:   Procedure Laterality Date   [facility]    HYSTERECTOMY  2009    OTHER SURGICAL HISTORY  02/11/15    DR Ugarte  EXCISION OF LT LOWER QUAD NONHEALING SKIN LESION    SMALL INTESTINE SURGERY  2012    bowel obstruction @ [facility] Dr. Corrinne Boring  [id number]    DR. Nicholas Siddiqui     Family History   Problem Relation Age of Onset    Diabetes Mother     Heart Disease Mother         stints    Diabetes Sister     High Blood Pressure Sister      Social History     Socioeconomic History    Marital status:      Spouse name: Not on file    Number of children: Not on file    Years of education: Not on file    Highest education level: Not on file   Occupational History    Not on file   Tobacco Use    Smoking status: Current Some Day Smoker     Packs/day: 0.25     Years: 15.00     Pack years: 3.75     Types: Cigarettes    Smokeless tobacco: Never Used    Tobacco comment: 4/day    Substance and Sexual Activity    Alcohol use: No    Drug use: No    Sexual activity: Not on file   Other Topics Concern    Not on file   Social History Narrative    Not on file     Social Determinants of Health     Financial Resource Strain:     Difficulty of Paying Living Expenses: Not on file   Food Insecurity:     Worried About Running Out of Food in the Last Year: Not on file    Denisa of Food in the Last Year: Not on file Transportation Needs:     Lack of Transportation (Medical): Not on file    Lack of Transportation (Non-Medical): Not on file   Physical Activity:     Days of Exercise per Week: Not on file    Minutes of Exercise per Session: Not on file   Stress:     Feeling of Stress : Not on file   Social Connections:     Frequency of Communication with Friends and Family: Not on file    Frequency of Social Gatherings with Friends and Family: Not on file    Attends Jew Services: Not on file    Active Member of [address] [place] [address] [employer] or Organizations: Not on file    Attends Club or Organization Meetings: Not on file    Marital Status: Not on file   Intimate Partner Violence:     Fear of Current or Ex-Partner: Not on file    Emotionally Abused: Not on file    Physically Abused: Not on file    Sexually Abused: Not on file   Housing Stability:     Unable to Pay for Housing in the Last Year: Not on file    Number of [place] in the Last Year: Not on file    Unstable Housing in the Last Year: Not on file       Current Outpatient Medications on File Prior to Visit   Medication Sig Dispense Refill    lidocaine (LMX) 4 % cream Apply a half dollar sized amount to intact skin topically up to twice daily as needed for pain 1 Tube 1    vitamin D (ERGOCALCIFEROL) 98451 units CAPS capsule Take 1 capsule by mouth once a week 12 capsule 1    magnesium oxide (MAG-OX) 400 (240 Mg) MG tablet Take 1 tablet by mouth daily 30 tablet 0     No current facility-administered medications on file prior to visit. Pt presents today for a f/u of chronic left knee, abdominal (R>L), and mid back pain. Has been having more LLQ after lifting a heavy object. Feels the weather is making things worse. She started taking some B12 and other vitamins. MRI left knee 9/9 + for tendinosis, no ligament or meniscus tearing. She uses biofreeze which helps for several hours.    Pt feels pain level and functioning improves with prescribed medications and is able to do PT. Pt feels that weather changes aggravates the pain. Pt c/o occasional generalized radiating numbness and tingling.   Abdomen pain has been a constant ache since undergoing abdominal surgery mesh surgical placement after incarcerated spigelian hernia repair on July 23, 2012 by Dr. Martin Presnelson. Was told mesh cannot be taken out due to scar tissue. She helps care for her mom. Says she was told in the past she has fibromyalgia.         Review of Systems   Constitutional: Negative for fever. HENT: Negative for congestion and sore throat. Respiratory: Negative for shortness of breath. Gastrointestinal: Negative for abdominal pain. Genitourinary: Negative for difficulty urinating. Musculoskeletal: Positive for back pain. Neurological: Negative for speech difficulty and headaches. Hematological: Negative for adenopathy. Psychiatric/Behavioral: Negative for agitation. All other systems reviewed and are negative. Objective:     Vitals:  Temp 97.1 °F (36.2 °C)   Ht 5' 4\" (1.626 m)   Wt 147 lb (66.7 kg) Comment: PER PT  LMP 01/01/2009   BMI 25.23 kg/m² Pain Score:   7 (STOMACH)      Physical Exam  Vitals and nursing note reviewed.          Pt is alert and oriented x 3.  Recent and remote memory is intact.  Mood, judgement and affect are normal.  No signs of distress or SOB noted.  Visualized skin intact.  Sensation intact to light touch. Decreased ROM with flexion and extension of low back.  Tender with palpation to right lumbar spine with positive provacative maneuvers noted.  Negative SLR.   Pt is able to briefly heel walk and toe walk.   Strength, balance, and coordination are functional for ambulation. RLQ tender to palpation. Surgical scars noted. Left knee with decreased ROM.  Tenderness noted with palpation. Mild swelling noted with arthritic deformity noted.  Crepitus with ROM.  Gait antalgic.        Assessment:      Diagnosis Orders   1.  Abdominal pain, chronic, right upper quadrant  HYDROcodone-acetaminophen (NORCO) 5-325 MG per tablet   2. Lumbosacral spondylosis without myelopathy  HYDROcodone-acetaminophen (NORCO) 5-325 MG per tablet   3. Chronic, continuous use of opioids  HYDROcodone-acetaminophen (NORCO) 5-325 MG per tablet   4. Chronic pain of left knee  HYDROcodone-acetaminophen (NORCO) 5-325 MG per tablet       Plan:     Periodic Controlled Substance Monitoring: Possible medication side effects, risk of tolerance/dependence & alternative treatments discussed. ,No signs of potential drug abuse or diversion identified. ,Assessed functional status. ,Obtaining appropriate analgesic effect of treatment. IVÁN Pulido - CNP)    Orders Placed This Encounter   Medications    HYDROcodone-acetaminophen (NORCO) 5-325 MG per tablet     Sig: Take 1 tablet by mouth 3 times daily as needed for Pain for up to 30 days. Dispense:  90 tablet     Refill:  0     Reduce doses taken as pain becomes manageable       No orders of the defined types were placed in this encounter. Discussed options with the patient today.  Continue Norco for chronic multijoint pain. Advised to follow up with GI regarding abdominal pain. Options limited for abdominal pain.  Completed PT.  All questions were answered.  Pt verbalized understanding and agrees with above plan. Utox reviewed and appropriate from 3/10/22. Narcan sent 5/3/21.        Will continue medications for chronic pain as they help pt function with ADL and improve quality of life.  Discussed possible risks of opiate medication with pt, including but not limited to, constipation, nausea or vomiting, sedation, urinary retention, dependence and possible addiction. Pt agrees to use medication as directed.  Pt advised to not use opiates while driving or operating heavy equipment, or in situations where pt may harm him/herself or others.  Pt is aware that while on narcotics, pt needs to be seen monthly to reassess pain and need for continued medication.  NDP reviewed. Rolande Kawasaki was reviewed.  This NP saw pt under direct supervision of Dr. Perry Horton    Follow up:  Return in about 4 weeks (around 5/5/2022) for review meds and reassess pain.     IVÁN Alston - CNP

## 2022-05-10 ENCOUNTER — OFFICE VISIT (OUTPATIENT)
Dept: PAIN MANAGEMENT | Age: 57
End: 2022-05-10
Payer: COMMERCIAL

## 2022-05-10 VITALS
WEIGHT: 147 LBS | DIASTOLIC BLOOD PRESSURE: 70 MMHG | BODY MASS INDEX: 25.1 KG/M2 | TEMPERATURE: 97.1 F | HEIGHT: 64 IN | SYSTOLIC BLOOD PRESSURE: 120 MMHG

## 2022-05-10 DIAGNOSIS — F11.90 CHRONIC, CONTINUOUS USE OF OPIOIDS: ICD-10-CM

## 2022-05-10 DIAGNOSIS — R10.11 ABDOMINAL PAIN, CHRONIC, RIGHT UPPER QUADRANT: ICD-10-CM

## 2022-05-10 DIAGNOSIS — G89.29 CHRONIC PAIN OF LEFT KNEE: ICD-10-CM

## 2022-05-10 DIAGNOSIS — M25.562 CHRONIC PAIN OF LEFT KNEE: ICD-10-CM

## 2022-05-10 DIAGNOSIS — M47.817 LUMBOSACRAL SPONDYLOSIS WITHOUT MYELOPATHY: Primary | ICD-10-CM

## 2022-05-10 DIAGNOSIS — G89.29 ABDOMINAL PAIN, CHRONIC, RIGHT UPPER QUADRANT: ICD-10-CM

## 2022-05-10 PROCEDURE — 4004F PT TOBACCO SCREEN RCVD TLK: CPT | Performed by: NURSE PRACTITIONER

## 2022-05-10 PROCEDURE — G8419 CALC BMI OUT NRM PARAM NOF/U: HCPCS | Performed by: NURSE PRACTITIONER

## 2022-05-10 PROCEDURE — 3017F COLORECTAL CA SCREEN DOC REV: CPT | Performed by: NURSE PRACTITIONER

## 2022-05-10 PROCEDURE — G8427 DOCREV CUR MEDS BY ELIG CLIN: HCPCS | Performed by: NURSE PRACTITIONER

## 2022-05-10 PROCEDURE — 99213 OFFICE O/P EST LOW 20 MIN: CPT | Performed by: NURSE PRACTITIONER

## 2022-05-10 RX ORDER — HYDROCODONE BITARTRATE AND ACETAMINOPHEN 5; 325 MG/1; MG/1
1 TABLET ORAL 3 TIMES DAILY PRN
Qty: 90 TABLET | Refills: 0 | Status: SHIPPED | OUTPATIENT
Start: 2022-05-10 | End: 2022-06-09 | Stop reason: SDUPTHER

## 2022-05-10 ASSESSMENT — ENCOUNTER SYMPTOMS
SORE THROAT: 0
SHORTNESS OF BREATH: 0
ABDOMINAL PAIN: 0
BACK PAIN: 1

## 2022-05-10 NOTE — PROGRESS NOTES
Jaimie RAGSDALE Monday  (1965)    5/10/2022    Subjective:     Luis Fernando Gauthier Monday is 49 y.o. female who complains today of:    Chief Complaint   Patient presents with    Back Pain         Allergies:  Augmentin [amoxicillin-pot clavulanate], Dye [iodides], Macrobid [nitrofurantoin monohydrate macrocrystals], and Flagyl [metronidazole]    Past Medical History:   Diagnosis Date    Anxiety     GERD (gastroesophageal reflux disease)     History of cellulitis and abscess     Irritable bowel syndrome      Past Surgical History:   Procedure Laterality Date   [facility], 2012    HYSTERECTOMY  2009    OTHER SURGICAL HISTORY  02/11/15    DR Ugarte  EXCISION OF LT LOWER QUAD NONHEALING SKIN LESION    SMALL INTESTINE SURGERY  2012    bowel obstruction @ [facility] Dr. Inez Saleh  [id number]    DR. Dunn Marking     Family History   Problem Relation Age of Onset    Diabetes Mother     Heart Disease Mother         stints    Diabetes Sister     High Blood Pressure Sister      Social History     Socioeconomic History    Marital status:      Spouse name: Not on file    Number of children: Not on file    Years of education: Not on file    Highest education level: Not on file   Occupational History    Not on file   Tobacco Use    Smoking status: Current Some Day Smoker     Packs/day: 0.25     Years: 15.00     Pack years: 3.75     Types: Cigarettes    Smokeless tobacco: Never Used    Tobacco comment: 4/day    Substance and Sexual Activity    Alcohol use: No    Drug use: No    Sexual activity: Not on file   Other Topics Concern    Not on file   Social History Narrative    Not on file     Social Determinants of Health     Financial Resource Strain:     Difficulty of Paying Living Expenses: Not on file   Food Insecurity:     Worried About Running Out of Food in the Last Year: Not on file    Denisa of Food in the Last Year: Not on file Transportation Needs:     Lack of Transportation (Medical): Not on file    Lack of Transportation (Non-Medical): Not on file   Physical Activity:     Days of Exercise per Week: Not on file    Minutes of Exercise per Session: Not on file   Stress:     Feeling of Stress : Not on file   Social Connections:     Frequency of Communication with Friends and Family: Not on file    Frequency of Social Gatherings with Friends and Family: Not on file    Attends Jew Services: Not on file    Active Member of [address] [place] [address] [employer] or Organizations: Not on file    Attends Club or Organization Meetings: Not on file    Marital Status: Not on file   Intimate Partner Violence:     Fear of Current or Ex-Partner: Not on file    Emotionally Abused: Not on file    Physically Abused: Not on file    Sexually Abused: Not on file   Housing Stability:     Unable to Pay for Housing in the Last Year: Not on file    Number of [place] in the Last Year: Not on file    Unstable Housing in the Last Year: Not on file       Current Outpatient Medications on File Prior to Visit   Medication Sig Dispense Refill    magnesium oxide (MAG-OX) 400 (240 Mg) MG tablet Take 1 tablet by mouth daily 30 tablet 0    lidocaine (LMX) 4 % cream Apply a half dollar sized amount to intact skin topically up to twice daily as needed for pain 1 Tube 1    vitamin D (ERGOCALCIFEROL) 29914 units CAPS capsule Take 1 capsule by mouth once a week 12 capsule 1     No current facility-administered medications on file prior to visit. Pt presents today for a f/u of chronic left knee, abdominal (R>L), and mid back pain. Has been having more LLQ after lifting a heavy object. Feels the weather is making things worse. She started taking some B12 and other vitamins. MRI left knee 9/9 + for tendinosis, no ligament or meniscus tearing. She uses biofreeze which helps for several hours.    Pt feels pain level and functioning improves with prescribed medications and is able to do PT. Pt feels that weather changes aggravates the pain. Pt c/o occasional generalized radiating numbness and tingling.   Abdomen pain has been a constant ache since undergoing abdominal surgery mesh surgical placement after incarcerated spigelian hernia repair on July 23, 2012 by Dr. Santoro. Was told mesh cannot be taken out due to scar tissue. She helps care for her mom. Says she was told in the past she has fibromyalgia.         Review of Systems   Constitutional: Negative for fever. HENT: Negative for congestion and sore throat. Respiratory: Negative for shortness of breath. Gastrointestinal: Negative for abdominal pain. Genitourinary: Negative for difficulty urinating. Musculoskeletal: Positive for back pain. Neurological: Negative for speech difficulty and headaches. Hematological: Negative for adenopathy. Psychiatric/Behavioral: Negative for agitation. All other systems reviewed and are negative. Objective:     Vitals:  /70 (Site: Right Upper Arm)   Temp 97.1 °F (36.2 °C) (Temporal)   Ht 5' 4\" (1.626 m)   Wt 147 lb (66.7 kg)   LMP 01/01/2009   BMI 25.23 kg/m² Pain Score:   7      Physical Exam  Vitals and nursing note reviewed.              Pt is alert and oriented x 3.  Recent and remote memory is intact.  Mood, judgement and affect are normal.  No signs of distress or SOB noted.  Visualized skin intact.  Sensation intact to light touch. Decreased ROM with flexion and extension of low back.  Tender with palpation to right lumbar spine with positive provacative maneuvers noted.  Negative SLR.   Pt is able to briefly heel walk and toe walk.   Strength, balance, and coordination are functional for ambulation. RLQ tender to palpation. Surgical scars noted. Left knee with decreased ROM.  Tenderness noted with palpation. Mild swelling noted with arthritic deformity noted.  Crepitus with ROM.  Gait antalgic.        Assessment:      Diagnosis Orders   1. Lumbosacral spondylosis without myelopathy  HYDROcodone-acetaminophen (NORCO) 5-325 MG per tablet   2. Abdominal pain, chronic, right upper quadrant  HYDROcodone-acetaminophen (NORCO) 5-325 MG per tablet   3. Chronic, continuous use of opioids  HYDROcodone-acetaminophen (NORCO) 5-325 MG per tablet   4. Chronic pain of left knee  HYDROcodone-acetaminophen (NORCO) 5-325 MG per tablet       Plan:     Periodic Controlled Substance Monitoring: Possible medication side effects, risk of tolerance/dependence & alternative treatments discussed. ,No signs of potential drug abuse or diversion identified. ,Assessed functional status. ,Obtaining appropriate analgesic effect of treatment. Seth Owens, APRN - CNP)    Orders Placed This Encounter   Medications    HYDROcodone-acetaminophen (NORCO) 5-325 MG per tablet     Sig: Take 1 tablet by mouth 3 times daily as needed for Pain for up to 30 days. Dispense:  90 tablet     Refill:  0     Reduce doses taken as pain becomes manageable       No orders of the defined types were placed in this encounter. Discussed options with the patient today.  Continue Norco for chronic multijoint pain. Advised to follow up with GI regarding abdominal pain, has not done this yet. Options limited for abdominal pain.  Completed PT.  All questions were answered.  Pt verbalized understanding and agrees with above plan. Utox reviewed and appropriate from 3/10/22. Narcan sent 5/3/21.        Will continue medications for chronic pain as they help pt function with ADL and improve quality of life.  Discussed possible risks of opiate medication with pt, including but not limited to, constipation, nausea or vomiting, sedation, urinary retention, dependence and possible addiction. Pt agrees to use medication as directed.  Pt advised to not use opiates while driving or operating heavy equipment, or in situations where pt may harm him/herself or others.  Pt is aware that while on narcotics, pt needs to be seen monthly to reassess pain and need for continued medication.  NDP reviewed. Brant Henry was reviewed.  This NP saw pt under direct supervision of Dr. Magdiel Johnson    Follow up:  Return in about 4 weeks (around 6/7/2022) for review meds and reassess pain.     Mainor Mullins, IVÁN - CNP

## 2022-06-09 ENCOUNTER — OFFICE VISIT (OUTPATIENT)
Dept: PAIN MANAGEMENT | Age: 57
End: 2022-06-09
Payer: COMMERCIAL

## 2022-06-09 VITALS
DIASTOLIC BLOOD PRESSURE: 80 MMHG | BODY MASS INDEX: 25.1 KG/M2 | TEMPERATURE: 97.5 F | HEIGHT: 64 IN | WEIGHT: 147 LBS | SYSTOLIC BLOOD PRESSURE: 122 MMHG

## 2022-06-09 DIAGNOSIS — R10.11 ABDOMINAL PAIN, CHRONIC, RIGHT UPPER QUADRANT: ICD-10-CM

## 2022-06-09 DIAGNOSIS — G89.29 CHRONIC PAIN OF LEFT KNEE: ICD-10-CM

## 2022-06-09 DIAGNOSIS — G89.29 ABDOMINAL PAIN, CHRONIC, RIGHT UPPER QUADRANT: ICD-10-CM

## 2022-06-09 DIAGNOSIS — M25.562 CHRONIC PAIN OF LEFT KNEE: ICD-10-CM

## 2022-06-09 DIAGNOSIS — F11.90 CHRONIC, CONTINUOUS USE OF OPIOIDS: ICD-10-CM

## 2022-06-09 DIAGNOSIS — M47.817 LUMBOSACRAL SPONDYLOSIS WITHOUT MYELOPATHY: ICD-10-CM

## 2022-06-09 PROCEDURE — G8419 CALC BMI OUT NRM PARAM NOF/U: HCPCS | Performed by: NURSE PRACTITIONER

## 2022-06-09 PROCEDURE — 99214 OFFICE O/P EST MOD 30 MIN: CPT | Performed by: NURSE PRACTITIONER

## 2022-06-09 PROCEDURE — 4004F PT TOBACCO SCREEN RCVD TLK: CPT | Performed by: NURSE PRACTITIONER

## 2022-06-09 PROCEDURE — G8427 DOCREV CUR MEDS BY ELIG CLIN: HCPCS | Performed by: NURSE PRACTITIONER

## 2022-06-09 PROCEDURE — 3017F COLORECTAL CA SCREEN DOC REV: CPT | Performed by: NURSE PRACTITIONER

## 2022-06-09 RX ORDER — HYDROCODONE BITARTRATE AND ACETAMINOPHEN 5; 325 MG/1; MG/1
1 TABLET ORAL 3 TIMES DAILY PRN
Qty: 60 TABLET | Refills: 0 | Status: SHIPPED | OUTPATIENT
Start: 2022-06-09 | End: 2022-06-28 | Stop reason: SDUPTHER

## 2022-06-09 ASSESSMENT — ENCOUNTER SYMPTOMS
BACK PAIN: 1
SHORTNESS OF BREATH: 0
SORE THROAT: 0
ABDOMINAL PAIN: 0

## 2022-06-09 NOTE — PROGRESS NOTES
Jaimie RAGSDALE Monday  (1965)    6/9/2022    Subjective:     Jay Found Monday is 49 y.o. female who complains today of:    Chief Complaint   Patient presents with    Back Pain    Abdominal Pain         Allergies:  Augmentin [amoxicillin-pot clavulanate], Dye [iodides], Macrobid [nitrofurantoin monohydrate macrocrystals], and Flagyl [metronidazole]    Past Medical History:   Diagnosis Date    Anxiety     GERD (gastroesophageal reflux disease)     History of cellulitis and abscess     Irritable bowel syndrome      Past Surgical History:   Procedure Laterality Date   [address] [facility] [address], 2012    HYSTERECTOMY (CERVIX STATUS UNKNOWN)  2009    OTHER SURGICAL HISTORY  02/11/15    DR [facility]  EXCISION OF LT LOWER QUAD NONHEALING SKIN LESION    SMALL INTESTINE SURGERY  2012    bowel obstruction @ [address] [facility] [address]  [id number]    DR. Erich Pedro     Family History   Problem Relation Age of Onset    Diabetes Mother     Heart Disease Mother         stints    Diabetes Sister     High Blood Pressure Sister      Social History     Socioeconomic History    Marital status:      Spouse name: Not on file    Number of children: Not on file    Years of education: Not on file    Highest education level: Not on file   Occupational History    Not on file   Tobacco Use    Smoking status: Current Some Day Smoker     Packs/day: 0.25     Years: 15.00     Pack years: 3.75     Types: Cigarettes    Smokeless tobacco: Never Used    Tobacco comment: 4/day    Substance and Sexual Activity    Alcohol use: No    Drug use: No    Sexual activity: Not on file   Other Topics Concern    Not on file   Social History Narrative    Not on file     Social Determinants of Health     Financial Resource Strain:     Difficulty of Paying Living Expenses: Not on file   Food Insecurity:     Worried About Running Out of Food in the Last Year: Not on file    Denisa of Food in the Last Year: Not on file   Transportation Needs:     Lack of Transportation (Medical): Not on file    Lack of Transportation (Non-Medical): Not on file   Physical Activity:     Days of Exercise per Week: Not on file    Minutes of Exercise per Session: Not on file   Stress:     Feeling of Stress : Not on file   Social Connections:     Frequency of Communication with Friends and Family: Not on file    Frequency of Social Gatherings with Friends and Family: Not on file    Attends Sikh Services: Not on file    Active Member of [address] [place] [address] [employer] or Organizations: Not on file    Attends Club or Organization Meetings: Not on file    Marital Status: Not on file   Intimate Partner Violence:     Fear of Current or Ex-Partner: Not on file    Emotionally Abused: Not on file    Physically Abused: Not on file    Sexually Abused: Not on file   Housing Stability:     Unable to Pay for Housing in the Last Year: Not on file    Number of [place] in the Last Year: Not on file    Unstable Housing in the Last Year: Not on file       Current Outpatient Medications on File Prior to Visit   Medication Sig Dispense Refill    lidocaine (LMX) 4 % cream Apply a half dollar sized amount to intact skin topically up to twice daily as needed for pain 1 Tube 1    magnesium oxide (MAG-OX) 400 (240 Mg) MG tablet Take 1 tablet by mouth daily 30 tablet 0    vitamin D (ERGOCALCIFEROL) 22432 units CAPS capsule Take 1 capsule by mouth once a week (Patient not taking: Reported on 6/9/2022) 12 capsule 1     No current facility-administered medications on file prior to visit. Pt presents today for a f/u of chronic left knee, abdominal (R>L), and mid back pain. Continues having same LLQ abdominal pain, says its the mesh, has not had it evaluated. Feels the weather continues making things worse. She started taking some B12 and other vitamins. MRI left knee 9/9 + for tendinosis, no ligament or meniscus tearing.  She uses biofreeze which helps for several hours. Pt feels pain level and functioning improves with prescribed medications and is able to do PT. Pt feels that weather changes aggravates the pain. Pt c/o occasional generalized radiating numbness and tingling.   Abdomen pain has been a constant ache since undergoing abdominal surgery mesh surgical placement after incarcerated spigelian hernia repair on July 23, 2012 by Dr. DEREK WADDELL. Was told mesh cannot be taken out due to scar tissue. She helps care for her mom. Says she was told in the past she has fibromyalgia.         Review of Systems   Constitutional: Negative for fever. HENT: Negative for congestion and sore throat. Respiratory: Negative for shortness of breath. Gastrointestinal: Negative for abdominal pain. Genitourinary: Negative for difficulty urinating. Musculoskeletal: Positive for back pain. Neurological: Negative for speech difficulty and headaches. Hematological: Negative for adenopathy. Psychiatric/Behavioral: Negative for agitation. All other systems reviewed and are negative. Objective:     Vitals:  /80 (Site: Right Upper Arm)   Temp 97.5 °F (36.4 °C) (Temporal)   Ht 5' 4\" (1.626 m)   Wt 147 lb (66.7 kg)   LMP 01/01/2009   BMI 25.23 kg/m² Pain Score:   6      Physical Exam  Vitals and nursing note reviewed.            Pt is alert and oriented x 3.  Recent and remote memory is intact.  Mood, judgement and affect are normal.  No signs of distress or SOB noted.  Visualized skin intact.  Sensation intact to light touch. Decreased ROM with flexion and extension of low back.  Tender with palpation to right lumbar spine with positive provacative maneuvers noted.  Negative SLR.   Pt is able to briefly heel walk and toe walk.   Strength, balance, and coordination are functional for ambulation. RLQ tender to palpation. Surgical scars noted. Left knee with decreased ROM.  Tenderness noted with palpation.  Mild swelling noted with arthritic deformity noted.  Crepitus with ROM.  Gait antalgic.        Assessment:      Diagnosis Orders   1. Abdominal pain, chronic, right upper quadrant  HYDROcodone-acetaminophen (NORCO) 5-325 MG per tablet   2. Lumbosacral spondylosis without myelopathy  HYDROcodone-acetaminophen (NORCO) 5-325 MG per tablet   3. Chronic, continuous use of opioids  HYDROcodone-acetaminophen (NORCO) 5-325 MG per tablet   4. Chronic pain of left knee  HYDROcodone-acetaminophen (NORCO) 5-325 MG per tablet       Plan:     Periodic Controlled Substance Monitoring: Possible medication side effects, risk of tolerance/dependence & alternative treatments discussed. ,No signs of potential drug abuse or diversion identified. ,Assessed functional status. ,Obtaining appropriate analgesic effect of treatment. Georgette Owens, APRN - CNP)    Orders Placed This Encounter   Medications    HYDROcodone-acetaminophen (NORCO) 5-325 MG per tablet     Sig: Take 1 tablet by mouth 3 times daily as needed for Pain for up to 20 days. Sending partial Rx today. Patient will call at the end of the month for another partial Rx due to her travel plans. Dispense:  60 tablet     Refill:  0     Reduce doses taken as pain becomes manageable       No orders of the defined types were placed in this encounter. Discussed options with the patient today. Will be on vacation 7/1-10.  We will send partial Rx today, and patient will call at the end of the month for another partial Rx. Continue Norco for chronic multijoint pain. Advised again to follow up with GI or PCP regarding abdominal pain, has not done this yet. Options limited for abdominal pain.  Completed PT.  All questions were answered.  Pt verbalized understanding and agrees with above plan.  Utox reviewed and appropriate from 3/10/22. Narcan sent 5/3/21.        Will continue medications for chronic pain as they help pt function with ADL and improve quality of life.  Discussed possible risks of opiate medication with pt, including but not limited to, constipation, nausea or vomiting, sedation, urinary retention, dependence and possible addiction. Pt agrees to use medication as directed. Pt advised to not use opiates while driving or operating heavy equipment, or in situations where pt may harm him/herself or others.  Pt is aware that while on narcotics, pt needs to be seen monthly to reassess pain and need for continued medication.  NDP reviewed. Mauro Duong was reviewed.  This NP saw pt under direct supervision of Dr. Kadi Duval    Follow up:  Return in about 4 weeks (around 7/7/2022) for review meds and reassess pain.     Hilda Puente, APRN - CNP

## 2022-06-28 ENCOUNTER — TELEPHONE (OUTPATIENT)
Dept: PAIN MANAGEMENT | Age: 57
End: 2022-06-28

## 2022-06-28 DIAGNOSIS — F11.90 CHRONIC, CONTINUOUS USE OF OPIOIDS: ICD-10-CM

## 2022-06-28 DIAGNOSIS — G89.29 ABDOMINAL PAIN, CHRONIC, RIGHT UPPER QUADRANT: ICD-10-CM

## 2022-06-28 DIAGNOSIS — M25.562 CHRONIC PAIN OF LEFT KNEE: ICD-10-CM

## 2022-06-28 DIAGNOSIS — M47.817 LUMBOSACRAL SPONDYLOSIS WITHOUT MYELOPATHY: ICD-10-CM

## 2022-06-28 DIAGNOSIS — G89.29 CHRONIC PAIN OF LEFT KNEE: ICD-10-CM

## 2022-06-28 DIAGNOSIS — R10.11 ABDOMINAL PAIN, CHRONIC, RIGHT UPPER QUADRANT: ICD-10-CM

## 2022-06-28 RX ORDER — HYDROCODONE BITARTRATE AND ACETAMINOPHEN 5; 325 MG/1; MG/1
TABLET ORAL
Qty: 60 TABLET | OUTPATIENT
Start: 2022-06-28

## 2022-06-28 RX ORDER — HYDROCODONE BITARTRATE AND ACETAMINOPHEN 5; 325 MG/1; MG/1
1 TABLET ORAL 3 TIMES DAILY PRN
Qty: 60 TABLET | Refills: 0 | Status: SHIPPED | OUTPATIENT
Start: 2022-06-29 | End: 2022-07-19 | Stop reason: SDUPTHER

## 2022-06-28 NOTE — TELEPHONE ENCOUNTER
Pharmacy called regarding directions for hydrocodone. They state:  Sending partial Rx today. Patient will call at the end of the month for another partial Rx due to her travel plans. They state it also came with a fill date of tomorrow. They cannot fill it today without changing the fill date.

## 2022-06-28 NOTE — TELEPHONE ENCOUNTER
The instructions they are referring to were for the RX sent 3 weeks ago. The rx I sent today stated \"partial Rx today due to her travel plans. \"    I did not request the RX to be filled today. The fill date is tomorrow. Is the patient requesting it to be filled today?

## 2022-06-28 NOTE — TELEPHONE ENCOUNTER
Requested Prescriptions     Pending Prescriptions Disp Refills    HYDROcodone-acetaminophen (NORCO) 5-325 MG per tablet 60 tablet 0     Sig: Take 1 tablet by mouth 3 times daily as needed for Pain for up to 20 days. Sending partial Rx today. Patient will call at the end of the month for another partial Rx due to her travel plans.        Patient last seen on:  6/9/22  Date of last surgery:  n/a  Date of last refill:  6/9/22  Pain level:  n/a  Patient complaining of:  Pt requesting refill until next appt  Future appts: 7/19/22

## 2022-07-19 ENCOUNTER — OFFICE VISIT (OUTPATIENT)
Dept: PAIN MANAGEMENT | Age: 57
End: 2022-07-19
Payer: COMMERCIAL

## 2022-07-19 VITALS
DIASTOLIC BLOOD PRESSURE: 82 MMHG | WEIGHT: 147 LBS | HEIGHT: 63 IN | TEMPERATURE: 97.2 F | SYSTOLIC BLOOD PRESSURE: 118 MMHG | BODY MASS INDEX: 26.05 KG/M2

## 2022-07-19 DIAGNOSIS — G89.29 ABDOMINAL PAIN, CHRONIC, RIGHT UPPER QUADRANT: ICD-10-CM

## 2022-07-19 DIAGNOSIS — M47.817 LUMBOSACRAL SPONDYLOSIS WITHOUT MYELOPATHY: Primary | ICD-10-CM

## 2022-07-19 DIAGNOSIS — F11.90 CHRONIC, CONTINUOUS USE OF OPIOIDS: ICD-10-CM

## 2022-07-19 DIAGNOSIS — R10.11 ABDOMINAL PAIN, CHRONIC, RIGHT UPPER QUADRANT: ICD-10-CM

## 2022-07-19 DIAGNOSIS — M25.562 CHRONIC PAIN OF LEFT KNEE: ICD-10-CM

## 2022-07-19 DIAGNOSIS — G89.29 CHRONIC PAIN OF LEFT KNEE: ICD-10-CM

## 2022-07-19 PROCEDURE — G8427 DOCREV CUR MEDS BY ELIG CLIN: HCPCS | Performed by: NURSE PRACTITIONER

## 2022-07-19 PROCEDURE — 99213 OFFICE O/P EST LOW 20 MIN: CPT | Performed by: NURSE PRACTITIONER

## 2022-07-19 PROCEDURE — G8419 CALC BMI OUT NRM PARAM NOF/U: HCPCS | Performed by: NURSE PRACTITIONER

## 2022-07-19 PROCEDURE — 3017F COLORECTAL CA SCREEN DOC REV: CPT | Performed by: NURSE PRACTITIONER

## 2022-07-19 PROCEDURE — 4004F PT TOBACCO SCREEN RCVD TLK: CPT | Performed by: NURSE PRACTITIONER

## 2022-07-19 RX ORDER — HYDROCODONE BITARTRATE AND ACETAMINOPHEN 5; 325 MG/1; MG/1
1 TABLET ORAL 3 TIMES DAILY PRN
Qty: 90 TABLET | Refills: 0 | Status: SHIPPED | OUTPATIENT
Start: 2022-07-19 | End: 2022-08-18 | Stop reason: SDUPTHER

## 2022-07-19 ASSESSMENT — ENCOUNTER SYMPTOMS
SORE THROAT: 0
SHORTNESS OF BREATH: 0
ABDOMINAL PAIN: 0
BACK PAIN: 1

## 2022-07-19 NOTE — PROGRESS NOTES
Jaimie R Monday  (1965)    7/19/2022    Subjective:     Marylu Del Rio Monday is 49 y.o. female who complains today of:    Chief Complaint   Patient presents with    Back Pain    Leg Pain    Abdominal Pain         Allergies:  Augmentin [amoxicillin-pot clavulanate], Dye [iodides], Macrobid [nitrofurantoin monohydrate macrocrystals], and Flagyl [metronidazole]    Past Medical History:   Diagnosis Date    Anxiety     GERD (gastroesophageal reflux disease)     History of cellulitis and abscess     Irritable bowel syndrome      Past Surgical History:   Procedure Laterality Date    [address] [facility], 2012    HYSTERECTOMY (CERVIX STATUS UNKNOWN)  2009    OTHER SURGICAL HISTORY  02/11/15    DR Ugarte  EXCISION OF LT LOWER QUAD NONHEALING SKIN LESION    SMALL INTESTINE SURGERY  2012    bowel obstruction @ [address] [facility] [address] [place]  [id number]    DR. MAGALLON     Family History   Problem Relation Age of Onset    Diabetes Mother     Heart Disease Mother         stints    Diabetes Sister     High Blood Pressure Sister      Social History     Socioeconomic History    Marital status:      Spouse name: Not on file    Number of children: Not on file    Years of education: Not on file    Highest education level: Not on file   Occupational History    Not on file   Tobacco Use    Smoking status: Some Days     Packs/day: 0.25     Years: 15.00     Pack years: 3.75     Types: Cigarettes    Smokeless tobacco: Never    Tobacco comments:     4/day    Substance and Sexual Activity    Alcohol use: No    Drug use: No    Sexual activity: Not on file   Other Topics Concern    Not on file   Social History Narrative    Not on file     Social Determinants of Health     Financial Resource Strain: Not on file   Food Insecurity: Not on file   Transportation Needs: Not on file   Physical Activity: Not on file   Stress: Not on file   Social Connections: Not on file   Intimate Partner Violence: Not on file   Housing Stability: Not on file       Current Outpatient Medications on File Prior to Visit   Medication Sig Dispense Refill    lidocaine (LMX) 4 % cream Apply a half dollar sized amount to intact skin topically up to twice daily as needed for pain 1 Tube 1    magnesium oxide (MAG-OX) 400 (240 Mg) MG tablet Take 1 tablet by mouth daily 30 tablet 0    vitamin D (ERGOCALCIFEROL) 89940 units CAPS capsule Take 1 capsule by mouth once a week (Patient not taking: No sig reported) 12 capsule 1     No current facility-administered medications on file prior to visit. Pt presents today for a f/u of chronic left knee, abdominal (R>L), and mid back pain. She was on vacation but got sick and had to quarantine but negative covid. Continues having same LLQ abdominal pain, says its the mesh, has not had it evaluated. Feels the weather continues making things worse. She started taking some B12 and other vitamins. MRI left knee 9/9 + for tendinosis, no ligament or meniscus tearing. She uses biofreeze which helps for several hours. Pt feels pain level and functioning improves with prescribed medications and is able to do PT. Pt feels that weather changes aggravates the pain. Pt c/o occasional generalized radiating numbness and tingling. Abdomen pain has been a constant ache since undergoing abdominal surgery mesh surgical placement after incarcerated spigelian hernia repair on July 23, 2012 by Dr. Santoro. Was told mesh cannot be taken out due to scar tissue. She helps care for her mom. Says she was told in the past she has fibromyalgia. Review of Systems   Constitutional:  Negative for fever. HENT:  Negative for congestion and sore throat. Respiratory:  Negative for shortness of breath. Gastrointestinal:  Negative for abdominal pain. Genitourinary:  Negative for difficulty urinating. Musculoskeletal:  Positive for back pain.    Neurological:  Negative for speech difficulty and headaches. Hematological:  Negative for adenopathy. Psychiatric/Behavioral:  Negative for agitation. All other systems reviewed and are negative. Objective:     Vitals:  /82 (Site: Right Upper Arm, Position: Sitting)   Temp 97.2 °F (36.2 °C)   Ht 5' 3\" (1.6 m)   Wt 147 lb (66.7 kg)   LMP 01/01/2009   BMI 26.04 kg/m² Pain Score:   6      Physical Exam  Vitals and nursing note reviewed. Pt is alert and oriented x 3. Recent and remote memory is intact. Mood, judgement and affect are normal.  No signs of distress or SOB noted. Visualized skin intact. Sensation intact to light touch. Decreased ROM with flexion and extension of low back. Tender with palpation to right lumbar spine with positive provacative maneuvers noted. Negative SLR. Pt is able to briefly heel walk and toe walk. Strength, balance, and coordination are functional for ambulation. RLQ tender to palpation. Surgical scars noted. Left knee with decreased ROM. Tenderness noted with palpation. Mild swelling noted with arthritic deformity noted. Crepitus with ROM. Gait antalgic. Assessment:      Diagnosis Orders   1. Lumbosacral spondylosis without myelopathy  HYDROcodone-acetaminophen (NORCO) 5-325 MG per tablet      2. Abdominal pain, chronic, right upper quadrant  HYDROcodone-acetaminophen (NORCO) 5-325 MG per tablet      3. Chronic, continuous use of opioids  HYDROcodone-acetaminophen (NORCO) 5-325 MG per tablet      4. Chronic pain of left knee  HYDROcodone-acetaminophen (NORCO) 5-325 MG per tablet          Plan:     Periodic Controlled Substance Monitoring: Possible medication side effects, risk of tolerance/dependence & alternative treatments discussed., No signs of potential drug abuse or diversion identified. , Assessed functional status., Obtaining appropriate analgesic effect of treatment.  Fox Owens, APRN - CNP)    Orders Placed This Encounter   Medications    HYDROcodone-acetaminophen ([address]) 5-325 MG per tablet     Sig: Take 1 tablet by mouth 3 times daily as needed for Pain for up to 30 days. Dispense:  90 tablet     Refill:  0     Reduce doses taken as pain becomes manageable       No orders of the defined types were placed in this encounter. Discussed options with the patient today. Continue Norco for chronic multijoint pain. Advised again to follow up with GI or PCP regarding abdominal pain, has not done this yet. Options limited for abdominal pain. Completed PT. All questions were answered. Pt verbalized understanding and agrees with above plan. Utox reviewed and appropriate from 3/10/22. Narcan sent 5/3/21. Will continue medications for chronic pain as they help pt function with ADL and improve quality of life. Discussed possible risks of opiate medication with pt, including but not limited to, constipation, nausea or vomiting, sedation, urinary retention, dependence and possible addiction. Pt agrees to use medication as directed. Pt advised to not use opiates while driving or operating heavy equipment, or in situations where pt may harm him/herself or others. Pt is aware that while on narcotics, pt needs to be seen monthly to reassess pain and need for continued medication. NDP reviewed. OARRS was reviewed. This NP saw pt under direct supervision of Dr. Gilbert Swain. Follow up:  Return in about 4 weeks (around 8/16/2022) for review meds and reassess pain.     IVÁN Alexandre - CNP

## 2022-07-20 ENCOUNTER — TELEPHONE (OUTPATIENT)
Dept: PAIN MANAGEMENT | Age: 57
End: 2022-07-20

## 2022-07-20 NOTE — TELEPHONE ENCOUNTER
Prior authorization submitted for Hydrocodone-acetaminophen (Norco) 5-325 mg tablet to Cover My Meds Key: [id number] - [place] Case ID: [id number] - Rx #: [id number]

## 2022-07-20 NOTE — TELEPHONE ENCOUNTER
Approval for Hydrocodone-Acetaminphen 5-325 mg Approvedtoday  CaseId:[id number];Status:Approved; Review Type:Prior Auth; Coverage Start Date:06/20/2022; Coverage End Date:07/20/2023;

## 2022-08-18 ENCOUNTER — OFFICE VISIT (OUTPATIENT)
Dept: PAIN MANAGEMENT | Age: 57
End: 2022-08-18
Payer: COMMERCIAL

## 2022-08-18 VITALS
WEIGHT: 147 LBS | TEMPERATURE: 96.9 F | HEART RATE: 70 BPM | BODY MASS INDEX: 25.1 KG/M2 | OXYGEN SATURATION: 99 % | DIASTOLIC BLOOD PRESSURE: 68 MMHG | SYSTOLIC BLOOD PRESSURE: 128 MMHG | HEIGHT: 64 IN

## 2022-08-18 DIAGNOSIS — R10.11 ABDOMINAL PAIN, CHRONIC, RIGHT UPPER QUADRANT: Primary | ICD-10-CM

## 2022-08-18 DIAGNOSIS — M47.817 LUMBOSACRAL SPONDYLOSIS WITHOUT MYELOPATHY: ICD-10-CM

## 2022-08-18 DIAGNOSIS — M25.562 CHRONIC PAIN OF LEFT KNEE: ICD-10-CM

## 2022-08-18 DIAGNOSIS — G89.29 CHRONIC PAIN OF LEFT KNEE: ICD-10-CM

## 2022-08-18 DIAGNOSIS — G89.29 ABDOMINAL PAIN, CHRONIC, RIGHT UPPER QUADRANT: Primary | ICD-10-CM

## 2022-08-18 DIAGNOSIS — F11.90 CHRONIC, CONTINUOUS USE OF OPIOIDS: ICD-10-CM

## 2022-08-18 PROCEDURE — 4004F PT TOBACCO SCREEN RCVD TLK: CPT | Performed by: NURSE PRACTITIONER

## 2022-08-18 PROCEDURE — G8419 CALC BMI OUT NRM PARAM NOF/U: HCPCS | Performed by: NURSE PRACTITIONER

## 2022-08-18 PROCEDURE — 3017F COLORECTAL CA SCREEN DOC REV: CPT | Performed by: NURSE PRACTITIONER

## 2022-08-18 PROCEDURE — G8427 DOCREV CUR MEDS BY ELIG CLIN: HCPCS | Performed by: NURSE PRACTITIONER

## 2022-08-18 PROCEDURE — 99213 OFFICE O/P EST LOW 20 MIN: CPT | Performed by: NURSE PRACTITIONER

## 2022-08-18 RX ORDER — HYDROCODONE BITARTRATE AND ACETAMINOPHEN 5; 325 MG/1; MG/1
1 TABLET ORAL 3 TIMES DAILY PRN
Qty: 90 TABLET | Refills: 0 | Status: SHIPPED | OUTPATIENT
Start: 2022-08-18 | End: 2022-09-22 | Stop reason: SDUPTHER

## 2022-08-18 ASSESSMENT — ENCOUNTER SYMPTOMS
DIARRHEA: 0
EYES NEGATIVE: 1
ABDOMINAL PAIN: 1
SHORTNESS OF BREATH: 0
COUGH: 0
CONSTIPATION: 0
TROUBLE SWALLOWING: 0

## 2022-08-18 NOTE — PROGRESS NOTES
Jaimie RAGSDALE Monday  (1965)    8/18/2022    Subjective:     Lynette Bailey Monday is 49 y.o. female who complains today of:    Chief Complaint   Patient presents with    1 Month Follow-Up         Allergies:  Augmentin [amoxicillin-pot clavulanate], Dye [iodides], Macrobid [nitrofurantoin monohydrate macrocrystals], and Flagyl [metronidazole]    Past Medical History:   Diagnosis Date    Anxiety     GERD (gastroesophageal reflux disease)     History of cellulitis and abscess     Irritable bowel syndrome      Past Surgical History:   Procedure Laterality Date    [address] [facility] [place], 2012    HYSTERECTOMY (CERVIX STATUS UNKNOWN)  2009    OTHER SURGICAL HISTORY  02/11/15    DR Ugarte  EXCISION OF LT LOWER QUAD NONHEALING SKIN LESION    SMALL INTESTINE SURGERY  2012    bowel obstruction @ [address] [place] [address] Dr. Steve Rodrigues  [id number]    DR. MAGALLON     Family History   Problem Relation Age of Onset    Diabetes Mother     Heart Disease Mother         stints    Diabetes Sister     High Blood Pressure Sister      Social History     Socioeconomic History    Marital status:      Spouse name: Not on file    Number of children: Not on file    Years of education: Not on file    Highest education level: Not on file   Occupational History    Not on file   Tobacco Use    Smoking status: Some Days     Packs/day: 0.25     Years: 15.00     Pack years: 3.75     Types: Cigarettes    Smokeless tobacco: Never    Tobacco comments:     4/day    Substance and Sexual Activity    Alcohol use: No    Drug use: No    Sexual activity: Not on file   Other Topics Concern    Not on file   Social History Narrative    Not on file     Social Determinants of Health     Financial Resource Strain: Not on file   Food Insecurity: Not on file   Transportation Needs: Not on file   Physical Activity: Not on file   Stress: Not on file   Social Connections: Not on file   Intimate Partner Violence: Not on file Housing Stability: Not on file       Current Outpatient Medications on File Prior to Visit   Medication Sig Dispense Refill    magnesium oxide (MAG-OX) 400 (240 Mg) MG tablet Take 1 tablet by mouth daily 30 tablet 0    lidocaine (LMX) 4 % cream Apply a half dollar sized amount to intact skin topically up to twice daily as needed for pain 1 Tube 1    vitamin D (ERGOCALCIFEROL) 08372 units CAPS capsule Take 1 capsule by mouth once a week (Patient not taking: No sig reported) 12 capsule 1     No current facility-administered medications on file prior to visit. Pt presents today for a f/u of her pain. PCP is Dr. Lara Flores DO. Pt last saw Hermenia Osgood, NP  for chronic left knee, abdominal (R>L), and mid back pain. Continues having same LLQ abdominal pain, says its the mesh, has not had it evaluated. She started taking some B12 and other vitamins and continues to use these. MRI left knee 9/9 + for tendinosis, no ligament or meniscus tearing. She uses biofreeze which helps for several hours. Abdomen pain has been a constant ache since undergoing abdominal surgery mesh surgical placement after incarcerated spigelian hernia repair on July 23, 2012 by Dr. Ernestina Clarke. Was told mesh cannot be taken out due to scar tissue. She helps care for her mom. Says she was told in the past she has fibromyalgia. She says she does have Lt thigh numbness but hasn't changed. She uses Norco 5mg TID PRN pain. She will alternate with motrin and tylenol as well. Pt feels pain level with her medication is 5/10, and can get \"to tears\" without medication. Pt feels that too much activity, some times doesn't know what makes the pain worse, and Biofreeze, medication, OTC motrin makes the pain better. Pt feels her medication helps   her function and improve her quality of life, specifically says allows to do ADL's and care for mom. Pt denies radiating numbness and tingling. Denies recent falls, injuries or trauma.   Pt denies new weakness. Pt reports PT has been tried \"I've tried everything\". Review of Systems   Constitutional: Negative. Negative for fatigue. HENT: Negative. Negative for trouble swallowing. Eyes: Negative. Respiratory:  Negative for cough and shortness of breath. Cardiovascular:  Negative for chest pain. Gastrointestinal:  Positive for abdominal pain. Negative for constipation and diarrhea. Endocrine: Negative. Genitourinary: Negative. Skin: Negative. Neurological:  Negative for dizziness, weakness and headaches. Hematological: Negative. Psychiatric/Behavioral: Negative. XR L hip 6/29/2021: Other moderate degenerative changes bilaterally symmetric, joint space is preserved. No fracture. XR L knee 6/29/2021: Joint spaces preserved. XR C-spine 6/3/2020: Displaced heights maintained, no fracture, facet arthropathy, instability C3-C6. XR L-spine 6/3/2020: Degenerative disc disease, facet arthropathy, no fracture. XR L knee 6/3/2020 standing films demonstrate preservation of joint spaces, no fracture  MRI R hip 3/12/2020: Small osteophytes, degenerative changes within the labrum. Left hip unremarkable. Normal SI joints. Minimal degenerative changes right hip, no fracture. MRI lumbar spine 8/6/19: No fracture. T12-L1, L1 L2, L2 L3, L3 L4, L4-L5 normal canal and foramen. L5-S1 mild canal stenosis, normal foramen. XR R Hip 12/26/18: hip joint spaces maintained. No fracture. EMG B LE 1/10/19: This study is limited, but normal. Although limited, there is no clear electrodiagnostic evidence for a generalized large fiber sensorimotor peripheral polyneuropathy or active lumbosacral motor radiculopathy. XR LS Spine 6/1/18: degenerative disc disease, lumbar facet arthropathy, no fracture. CT Abd Pelvis 4/14/18: stable right lower lung nodule, unchanged May 16. Hysterectomy. Remote RLQ and Left anterior abd wall surgical procedures. CT Abd Pelvis 5/23/16: lung nodules. Hepatic steatosis. Gallbladder sludge. Right kidney calcification. Non obstructing right renal calculi     UDS 9/8/1915: Free of illicits, consistent with [place]  UDS 6/3/2020: Free of substances, consistent with [place]  UDS 9/60/74: free of illicits, consistent with Norco.  Opioid risk tool score 0, low risk     Objective:     Vitals:  /68   Pulse 70   Temp 96.9 °F (36.1 °C) (Infrared)   Ht 5' 4\" (1.626 m)   Wt 147 lb (66.7 kg)   LMP 01/01/2009   SpO2 99%   BMI 25.23 kg/m² Pain Score:   5      Physical Exam  Vitals and nursing note reviewed. This is a pleasant female who answers questions appropriately and follows commands. Pt is alert and oriented x 3. Recent and remote memory is intact. Mood and affect, judgement and insight are normal.  No signs of distress, no dyspnea or SOB noted. HEENT: PERRL. Neck is supple, trachea midline. No lymphadenopathy noted. Abdomin soft and round. Tender with palpation in all four quadrants - greatest in LLQ today. Surgical scars noted. Decreased ROM with flexion and extension of low back. Tender with palpation to lumbar spine with palpation on left with positive provacative maneuvers noted. Negative SLR. Decreased ROM Lt hip with discomfort. Tightness in both hamstrings noted. Balance and coordination normal.  Strength is functional for ambulation. Cranial nerves II-XII are intact. Assessment:      Diagnosis Orders   1. Abdominal pain, chronic, right upper quadrant  HYDROcodone-acetaminophen (NORCO) 5-325 MG per tablet      2. Lumbosacral spondylosis without myelopathy  HYDROcodone-acetaminophen (NORCO) 5-325 MG per tablet      3. Chronic, continuous use of opioids  HYDROcodone-acetaminophen (NORCO) 5-325 MG per tablet      4.  Chronic pain of left knee  HYDROcodone-acetaminophen (NORCO) 5-325 MG per tablet          Plan:     Periodic Controlled Substance Monitoring: Possible medication side effects, risk of tolerance/dependence & alternative treatments discussed., No signs of potential drug abuse or diversion identified. , Assessed functional status., Obtaining appropriate analgesic effect of treatment. (Alina Davis, APRN - CNP)    Orders Placed This Encounter   Medications    HYDROcodone-acetaminophen (NORCO) 5-325 MG per tablet     Sig: Take 1 tablet by mouth 3 times daily as needed for Pain for up to 30 days. Dispense:  90 tablet     Refill:  0     Reduce doses taken as pain becomes manageable       No orders of the defined types were placed in this encounter. Discussed options with the patient today. Anatomic model pathology was shown and reviewed with pt. Options are limited. She has seen GI. Will continue her current dose of Norco 5mg TID PRN pain as it helps her function. All questions were answered. Discussed home exercise program.  Relevant imaging and pain generators reviewed. Pt verbalized understanding and agrees with above plan. Pt has chronic pain. Utox reviewed and appropriate from March 2022. ORT score 0 - low risk. Narcan Rx sent in May 2021. Will continue medications for chronic pain that has been previously directed as they do help pt function with ADL and improve quality of life. Pt is aware goal is to use the least amount of medication possible to allow pt to function and help with quality of life as discussed in detail. Ideal to keep MME below 50 which it is. Have discussed proper disposal of narcotic medication. Advised to have medications in safe place and locked up/in lock box. Discussed possible risks of opiate medication with pt, including, but not limited to possible constipation, nausea or vomiting, sedation, urinary retention, dependence and possible addiction. Pt agrees to use medication as prescribed/as directed. Pt advised to not use opiates while driving or operating heavy equipment, or in situations where pt may harm him/herself or others.   Pt is aware that while on narcotics, pt needs to be seen to reassess pain and reassess need for continued medication at that time. NDP reviewed. OARRS was reviewed. This NP saw pt under direct supervision of Dr. Valentino Carson. Follow up:  Return in about 4 weeks (around 9/15/2022) for review meds and reassess pain.     Karen Clark, IVÁN - CNP

## 2022-09-22 ENCOUNTER — OFFICE VISIT (OUTPATIENT)
Dept: PAIN MANAGEMENT | Age: 57
End: 2022-09-22
Payer: COMMERCIAL

## 2022-09-22 VITALS
TEMPERATURE: 97.1 F | DIASTOLIC BLOOD PRESSURE: 82 MMHG | BODY MASS INDEX: 25.1 KG/M2 | SYSTOLIC BLOOD PRESSURE: 114 MMHG | HEIGHT: 64 IN | WEIGHT: 147 LBS

## 2022-09-22 DIAGNOSIS — M47.817 LUMBOSACRAL SPONDYLOSIS WITHOUT MYELOPATHY: ICD-10-CM

## 2022-09-22 DIAGNOSIS — G89.29 CHRONIC PAIN OF LEFT KNEE: ICD-10-CM

## 2022-09-22 DIAGNOSIS — R10.11 ABDOMINAL PAIN, CHRONIC, RIGHT UPPER QUADRANT: ICD-10-CM

## 2022-09-22 DIAGNOSIS — M25.562 CHRONIC PAIN OF LEFT KNEE: ICD-10-CM

## 2022-09-22 DIAGNOSIS — F11.90 CHRONIC, CONTINUOUS USE OF OPIOIDS: ICD-10-CM

## 2022-09-22 DIAGNOSIS — G89.29 ABDOMINAL PAIN, CHRONIC, RIGHT UPPER QUADRANT: ICD-10-CM

## 2022-09-22 PROCEDURE — 99214 OFFICE O/P EST MOD 30 MIN: CPT | Performed by: NURSE PRACTITIONER

## 2022-09-22 PROCEDURE — 4004F PT TOBACCO SCREEN RCVD TLK: CPT | Performed by: NURSE PRACTITIONER

## 2022-09-22 PROCEDURE — G8427 DOCREV CUR MEDS BY ELIG CLIN: HCPCS | Performed by: NURSE PRACTITIONER

## 2022-09-22 PROCEDURE — G8419 CALC BMI OUT NRM PARAM NOF/U: HCPCS | Performed by: NURSE PRACTITIONER

## 2022-09-22 PROCEDURE — 3017F COLORECTAL CA SCREEN DOC REV: CPT | Performed by: NURSE PRACTITIONER

## 2022-09-22 RX ORDER — HYDROCODONE BITARTRATE AND ACETAMINOPHEN 5; 325 MG/1; MG/1
1 TABLET ORAL 3 TIMES DAILY PRN
Qty: 90 TABLET | Refills: 0 | Status: SHIPPED | OUTPATIENT
Start: 2022-09-22 | End: 2022-10-24 | Stop reason: SDUPTHER

## 2022-09-22 ASSESSMENT — ENCOUNTER SYMPTOMS
SHORTNESS OF BREATH: 0
DIARRHEA: 0
TROUBLE SWALLOWING: 0
COUGH: 0
BACK PAIN: 1
ABDOMINAL PAIN: 1
CONSTIPATION: 0
EYES NEGATIVE: 1

## 2022-09-22 NOTE — PROGRESS NOTES
Stability: Not on file       Current Outpatient Medications on File Prior to Visit   Medication Sig Dispense Refill    lidocaine (LMX) 4 % cream Apply a half dollar sized amount to intact skin topically up to twice daily as needed for pain 1 Tube 1    magnesium oxide (MAG-OX) 400 (240 Mg) MG tablet Take 1 tablet by mouth daily 30 tablet 0    vitamin D (ERGOCALCIFEROL) 79515 units CAPS capsule Take 1 capsule by mouth once a week (Patient not taking: No sig reported) 12 capsule 1     No current facility-administered medications on file prior to visit. Pt presents today for a f/u of her pain. PCP is Dr. Hunter Gleason DO. Pt last saw this NP  for chronic left knee, abdominal (R>L), and mid back pain. She has had some worse LLQ pain today d/t less meds she believes. She says the pain can radiate to her belly button. Pushing pressures on LLQ helps. She started taking some B12 and other vitamins and continues to use these. MRI left knee 9/9 + for tendinosis, no ligament or meniscus tearing. She uses biofreeze which helps for several hours. Abdomen pain has been a constant ache since undergoing abdominal surgery mesh surgical placement after incarcerated spigelian hernia repair on July 23, 2012 by Dr. Phoebe Byrd. Was told mesh cannot be taken out due to scar tissue. She helps care for her mom and has permanent custody of her grand children she says. Says she was told in the past she has fibromyalgia. She says she does have Lt thigh numbness but hasn't changed. She uses Norco 5mg TID PRN pain. She will alternate with motrin and tylenol as well. She says she made her medication last/extended it as it was due to be refilled 5 days ago. She notices pain worse in abdomen with less medication. Pt feels pain level with her medication is 3/10, and 8/10 without medication. Pt feels that lack of meds, weather change, too much activity makes the pain worse, and pushing on LLQ, medication makes the pain better. Pt feels her medication helps   her function and improve her quality of life, specifically says allows caring for family and do ADL's. Pt denies radiating numbness and tingling. Denies recent falls, injuries or trauma. Pt denies new weakness. Pt reports PT has been done. Review of Systems   Constitutional: Negative. Negative for fatigue. HENT: Negative. Negative for trouble swallowing. Eyes: Negative. Respiratory:  Negative for cough and shortness of breath. Cardiovascular:  Negative for chest pain. Gastrointestinal:  Positive for abdominal pain. Negative for constipation and diarrhea. Endocrine: Negative. Genitourinary: Negative. Musculoskeletal:  Positive for arthralgias and back pain. Skin: Negative. Neurological:  Negative for dizziness, weakness and headaches. Hematological: Negative. Psychiatric/Behavioral: Negative. XR L hip 6/29/2021: Other moderate degenerative changes bilaterally symmetric, joint space is preserved. No fracture. XR L knee 6/29/2021: Joint spaces preserved. XR C-spine 6/3/2020: Displaced heights maintained, no fracture, facet arthropathy, instability C3-C6. XR L-spine 6/3/2020: Degenerative disc disease, facet arthropathy, no fracture. XR L knee 6/3/2020 standing films demonstrate preservation of joint spaces, no fracture  MRI R hip 3/12/2020: Small osteophytes, degenerative changes within the labrum. Left hip unremarkable. Normal SI joints. Minimal degenerative changes right hip, no fracture. MRI lumbar spine 8/6/19: No fracture. T12-L1, L1 L2, L2 L3, L3 L4, L4-L5 normal canal and foramen. L5-S1 mild canal stenosis, normal foramen. XR R Hip 12/26/18: hip joint spaces maintained. No fracture. EMG B LE 1/10/19: This study is limited, but normal. Although limited, there is no clear electrodiagnostic evidence for a generalized large fiber sensorimotor peripheral polyneuropathy or active lumbosacral motor radiculopathy.   XR LS Spine HYDROcodone-acetaminophen (NORCO) 5-325 MG per tablet      4. Chronic pain of left knee  HYDROcodone-acetaminophen (NORCO) 5-325 MG per tablet          Plan:     Periodic Controlled Substance Monitoring: Possible medication side effects, risk of tolerance/dependence & alternative treatments discussed., No signs of potential drug abuse or diversion identified. , Assessed functional status., Obtaining appropriate analgesic effect of treatment. (Alina Davis, APRN - CNP)    Orders Placed This Encounter   Medications    HYDROcodone-acetaminophen (NORCO) 5-325 MG per tablet     Sig: Take 1 tablet by mouth 3 times daily as needed for Pain for up to 30 days. Dispense:  90 tablet     Refill:  0     Reduce doses taken as pain becomes manageable       No orders of the defined types were placed in this encounter. Discussed options with the patient today. Anatomic model pathology was shown and reviewed with pt. She has seen GI. Options are limited. Will continue her current dose of Norco 5mg TID PRN pain as it helps her function. All questions were answered. Discussed home exercise program.  Relevant imaging and pain generators reviewed. Pt verbalized understanding and agrees with above plan. Pt has chronic pain. Utox reviewed and appropriate from March 2022. ORT score 0 - low risk. Narcan Rx sent in May 2021. Will continue medications for chronic pain that has been previously directed as they do help pt function with ADL and improve quality of life. Pt is aware goal is to use the least amount of medication possible to allow pt to function and help with quality of life as discussed in detail. Ideal to keep MME below 50 which it is. Have discussed proper disposal of narcotic medication. Advised to have medications in safe place and locked up/in lock box.   Discussed possible risks of opiate medication with pt, including, but not limited to possible constipation, nausea or vomiting, sedation, urinary retention, dependence and possible addiction. Pt agrees to use medication as prescribed/as directed. Pt advised to not use opiates while driving or operating heavy equipment, or in situations where pt may harm him/herself or others. Pt is aware that while on narcotics, pt needs to be seen to reassess pain and reassess need for continued medication at that time. NDP reviewed. OARRS was reviewed. This NP saw pt under direct supervision of Dr. Blanka Del Real. Follow up:  Return in about 4 weeks (around 10/20/2022) for review meds and reassess pain.     [place] Holli Clark, APRN - CNP

## 2022-10-24 ENCOUNTER — OFFICE VISIT (OUTPATIENT)
Dept: PAIN MANAGEMENT | Age: 57
End: 2022-10-24
Payer: COMMERCIAL

## 2022-10-24 VITALS
HEIGHT: 64 IN | SYSTOLIC BLOOD PRESSURE: 128 MMHG | TEMPERATURE: 97.3 F | DIASTOLIC BLOOD PRESSURE: 82 MMHG | BODY MASS INDEX: 25.1 KG/M2 | WEIGHT: 147 LBS

## 2022-10-24 DIAGNOSIS — R10.11 ABDOMINAL PAIN, CHRONIC, RIGHT UPPER QUADRANT: ICD-10-CM

## 2022-10-24 DIAGNOSIS — G89.29 ABDOMINAL PAIN, CHRONIC, RIGHT UPPER QUADRANT: ICD-10-CM

## 2022-10-24 DIAGNOSIS — M25.562 CHRONIC PAIN OF LEFT KNEE: ICD-10-CM

## 2022-10-24 DIAGNOSIS — G89.29 CHRONIC PAIN OF LEFT KNEE: ICD-10-CM

## 2022-10-24 DIAGNOSIS — M47.817 LUMBOSACRAL SPONDYLOSIS WITHOUT MYELOPATHY: ICD-10-CM

## 2022-10-24 DIAGNOSIS — F11.90 CHRONIC, CONTINUOUS USE OF OPIOIDS: ICD-10-CM

## 2022-10-24 DIAGNOSIS — M46.1 SACROILIAC INFLAMMATION (HCC): Primary | ICD-10-CM

## 2022-10-24 PROCEDURE — 3017F COLORECTAL CA SCREEN DOC REV: CPT | Performed by: NURSE PRACTITIONER

## 2022-10-24 PROCEDURE — 99214 OFFICE O/P EST MOD 30 MIN: CPT | Performed by: NURSE PRACTITIONER

## 2022-10-24 PROCEDURE — G8484 FLU IMMUNIZE NO ADMIN: HCPCS | Performed by: NURSE PRACTITIONER

## 2022-10-24 PROCEDURE — 4004F PT TOBACCO SCREEN RCVD TLK: CPT | Performed by: NURSE PRACTITIONER

## 2022-10-24 PROCEDURE — G8419 CALC BMI OUT NRM PARAM NOF/U: HCPCS | Performed by: NURSE PRACTITIONER

## 2022-10-24 PROCEDURE — G8427 DOCREV CUR MEDS BY ELIG CLIN: HCPCS | Performed by: NURSE PRACTITIONER

## 2022-10-24 RX ORDER — HYDROCODONE BITARTRATE AND ACETAMINOPHEN 5; 325 MG/1; MG/1
1 TABLET ORAL 3 TIMES DAILY PRN
Qty: 90 TABLET | Refills: 0 | Status: SHIPPED | OUTPATIENT
Start: 2022-10-24 | End: 2022-11-17 | Stop reason: SDUPTHER

## 2022-10-24 ASSESSMENT — ENCOUNTER SYMPTOMS
COUGH: 0
CONSTIPATION: 0
GASTROINTESTINAL NEGATIVE: 1
EYES NEGATIVE: 1
SHORTNESS OF BREATH: 0
BACK PAIN: 1
DIARRHEA: 0
TROUBLE SWALLOWING: 0

## 2022-10-24 NOTE — PROGRESS NOTES
Jaimie RAGSDALE Monday  (1965)    10/24/2022    Subjective:     Yusuf Ahn Monday is 56 y.o. female who complains today of:    Chief Complaint   Patient presents with    1 Month Follow-Up    Back Pain    Knee Pain     Both knee    Abdominal Pain         Allergies:  Augmentin [amoxicillin-pot clavulanate], Dye [iodides], Macrobid [nitrofurantoin monohydrate macrocrystals], and Flagyl [metronidazole]    Past Medical History:   Diagnosis Date    Anxiety     GERD (gastroesophageal reflux disease)     History of cellulitis and abscess     Irritable bowel syndrome      Past Surgical History:   Procedure Laterality Date    [address] [facility] [place], 2012    HYSTERECTOMY (CERVIX STATUS UNKNOWN)  2009    OTHER SURGICAL HISTORY  02/11/15    DR Ugarte  EXCISION OF LT LOWER QUAD NONHEALING SKIN LESION    SMALL INTESTINE SURGERY  2012    bowel obstruction @ [address] [place] [address] Dr. Surjit Carlson  [id number]    DR. MAGALLON     Family History   Problem Relation Age of Onset    Diabetes Mother     Heart Disease Mother         stints    Diabetes Sister     High Blood Pressure Sister      Social History     Socioeconomic History    Marital status:      Spouse name: Not on file    Number of children: Not on file    Years of education: Not on file    Highest education level: Not on file   Occupational History    Not on file   Tobacco Use    Smoking status: Some Days     Packs/day: 0.25     Years: 15.00     Pack years: 3.75     Types: Cigarettes    Smokeless tobacco: Never    Tobacco comments:     4/day    Substance and Sexual Activity    Alcohol use: No    Drug use: No    Sexual activity: Not on file   Other Topics Concern    Not on file   Social History Narrative    Not on file     Social Determinants of Health     Financial Resource Strain: Not on file   Food Insecurity: Not on file   Transportation Needs: Not on file   Physical Activity: Not on file   Stress: Not on file   Social Connections: Not on file   Intimate Partner Violence: Not on file   Housing Stability: Not on file       Current Outpatient Medications on File Prior to Visit   Medication Sig Dispense Refill    vitamin D (ERGOCALCIFEROL) 43854 units CAPS capsule Take 1 capsule by mouth once a week 12 capsule 1    magnesium oxide (MAG-OX) 400 (240 Mg) MG tablet Take 1 tablet by mouth daily 30 tablet 0    lidocaine (LMX) 4 % cream Apply a half dollar sized amount to intact skin topically up to twice daily as needed for pain (Patient not taking: Reported on 10/24/2022) 1 Tube 1     No current facility-administered medications on file prior to visit. Pt presents today for a f/u of her pain. PCP is Dr. Ayleen Cruz DO. Pt last saw this NP for chronic left knee, abdominal (R>L), and mid back pain. She says she had more pain in Rt lower buttock with cleaning yesterday. She says she will get pain in that area with walking. She started taking some B12 and other vitamins and continues to use these. MRI left knee 9/9 positive for tendinosis, no ligament or meniscus tearing. She uses biofreeze which helps for several hours. Abdomen pain has been a constant ache since undergoing abdominal surgery mesh surgical placement after incarcerated spigelian hernia repair on July 23, 2012 by Dr. Santoro. Was told mesh cannot be taken out due to scar tissue. She helps care for her mom and has permanent custody of her grand children she says. Says she was told in the past she has fibromyalgia. She says she does have Lt thigh numbness but hasn't changed. She uses Norco 5mg TID PRN pain. She will alternate with motrin and tylenol as well. She last took norco in middle of the night/early this morning - used less to make pills last until her appointment. She notices pain worse in abdomen with less medication. Pt feels pain level with her medication is 4/10, and 8/10 without medication.   Pt feels that laundry, cleaning makes the pain worse, and medication, Tylenol/motrin, Biofreeze makes the pain better. Pt feels her medication helps   her function and improve her quality of life, specifically says allows to clean and do ADL's. Pt denies radiating numbness and tingling. Denies recent falls, injuries or trauma. Pt denies new weakness. Pt reports PT has been tried. Review of Systems   Constitutional: Negative. Negative for fatigue. HENT: Negative. Negative for trouble swallowing. Eyes: Negative. Respiratory:  Negative for cough and shortness of breath. Cardiovascular:  Negative for chest pain. Gastrointestinal: Negative. Negative for constipation and diarrhea. Endocrine: Negative. Genitourinary: Negative. Musculoskeletal:  Positive for back pain and neck pain. Skin: Negative. Neurological:  Negative for dizziness, weakness and headaches. Hematological: Negative. Psychiatric/Behavioral: Negative. XR L hip 6/29/2021: Other moderate degenerative changes bilaterally symmetric, joint space is preserved. No fracture. XR L knee 6/29/2021: Joint spaces preserved. XR C-spine 6/3/2020: Displaced heights maintained, no fracture, facet arthropathy, instability C3-C6. XR L-spine 6/3/2020: Degenerative disc disease, facet arthropathy, no fracture. XR L knee 6/3/2020 standing films demonstrate preservation of joint spaces, no fracture  MRI R hip 3/12/2020: Small osteophytes, degenerative changes within the labrum. Left hip unremarkable. Normal SI joints. Minimal degenerative changes right hip, no fracture. MRI lumbar spine 8/6/19: No fracture. T12-L1, L1 L2, L2 L3, L3 L4, L4-L5 normal canal and foramen. L5-S1 mild canal stenosis, normal foramen. XR R Hip 12/26/18: hip joint spaces maintained. No fracture. EMG B LE 1/10/19:  This study is limited, but normal. Although limited, there is no clear electrodiagnostic evidence for a generalized large fiber sensorimotor peripheral polyneuropathy or active lumbosacral motor radiculopathy. XR LS Spine 6/1/18: degenerative disc disease, lumbar facet arthropathy, no fracture. CT Abd Pelvis 4/14/18: stable right lower lung nodule, unchanged May 16. Hysterectomy. Remote RLQ and Left anterior abd wall surgical procedures. CT Abd Pelvis 5/23/16: lung nodules. Hepatic steatosis. Gallbladder sludge. Right kidney calcification. Non obstructing right renal calculi     UDS 9/9/1756: Free of illicits, consistent with [place]  UDS 6/3/2020: Free of substances, consistent with [place]  UDS 1/05/16: free of illicits, consistent with Norco.  Opioid risk tool score 0, low risk  Objective:     Vitals:  /82 (Site: Right Upper Arm)   Temp 97.3 °F (36.3 °C) (Temporal)   Ht 5' 4\" (1.626 m)   Wt 147 lb (66.7 kg)   LMP 01/01/2009   BMI 25.23 kg/m² Pain Score:   8      Physical Exam  Vitals and nursing note reviewed. This is a pleasant female who answers questions appropriately and follows commands. Pt is alert and oriented x 3. Recent and remote memory is intact. Mood and affect, judgement and insight are normal.  No signs of distress, no dyspnea or SOB noted. HEENT: PERRL. Neck is supple, trachea midline. No lymphadenopathy noted. Abdomin soft and round. Tender with palpation in all four quadrants - greatest in LLQ. SLR increases LLQ pain. Surgical scars noted. Decreased ROM with flexion and extension of low back. Tender with palpation to lumbar spine with palpation. Tender with palpation over Rt SI  joint and positive Orestes's test.   Decreased ROM Lt hip with discomfort. Tightness in both hamstrings noted. Balance and coordination normal.  Strength is functional for ambulation. Cranial nerves II-XII are intact. Assessment:      Diagnosis Orders   1. Sacroiliac inflammation (HCC)  Urine Drug Screen    HYDROcodone-acetaminophen (NORCO) 5-325 MG per tablet      2.  Abdominal pain, chronic, right upper quadrant  Urine Drug Screen    HYDROcodone-acetaminophen (NORCO) 5-325 MG per tablet      3. Lumbosacral spondylosis without myelopathy  Urine Drug Screen    HYDROcodone-acetaminophen (NORCO) 5-325 MG per tablet      4. Chronic, continuous use of opioids  Urine Drug Screen    HYDROcodone-acetaminophen (NORCO) 5-325 MG per tablet      5. Chronic pain of left knee  Urine Drug Screen    HYDROcodone-acetaminophen (NORCO) 5-325 MG per tablet          Plan:     Periodic Controlled Substance Monitoring: Possible medication side effects, risk of tolerance/dependence & alternative treatments discussed., No signs of potential drug abuse or diversion identified. , Assessed functional status., Obtaining appropriate analgesic effect of treatment. , Random urine drug screen sent today. (Alina Davis, APRN - CNP)    Orders Placed This Encounter   Medications    HYDROcodone-acetaminophen (NORCO) 5-325 MG per tablet     Sig: Take 1 tablet by mouth 3 times daily as needed for Pain for up to 30 days. Dispense:  90 tablet     Refill:  0     Reduce doses taken as pain becomes manageable         Orders Placed This Encounter   Procedures    Urine Drug Screen     Chronic pain/illicits       Discussed options with the patient today. Anatomic model pathology was shown and reviewed with pt. Will continue her current dose of Norco 5mg TID PRN pain as it helps her function. Options are limited. Offered Rt SI joint injection which she declined. SI joint exercises given and discussed. All questions were answered. Discussed home exercise program.  Relevant imaging and pain generators reviewed. Pt verbalized understanding and agrees with above plan. Pt has chronic pain. Utox reviewed and appropriate from March 2022. ORT score 0 - low risk. Narcan Rx sent in May 2021. Will check Utox and f/u with report - last took norco early this morning. Will continue medications for chronic pain that has been previously directed as they do help pt function with ADL and improve quality of life.  Pt is aware goal is to use the least amount of medication possible to allow pt to function and help with quality of life as discussed in detail. Ideal to keep MME below 50 which it is. Have discussed proper disposal of narcotic medication. Advised to have medications in safe place and locked up/in lock box. Discussed possible risks of opiate medication with pt, including, but not limited to possible constipation, nausea or vomiting, sedation, urinary retention, dependence and possible addiction. Pt agrees to use medication as prescribed/as directed. Pt advised to not use opiates while driving or operating heavy equipment, or in situations where pt may harm him/herself or others. Pt is aware that while on narcotics, pt needs to be seen to reassess pain and reassess need for continued medication at that time. NDP reviewed. OARRS was reviewed. This NP saw pt under direct supervision of Dr. Joyce Odom. Follow up:  Return in about 4 weeks (around 11/21/2022) for review meds and reassess pain.     Duke Clark, IVÁN - CNP

## 2022-11-17 ENCOUNTER — OFFICE VISIT (OUTPATIENT)
Dept: PAIN MANAGEMENT | Age: 57
End: 2022-11-17
Payer: COMMERCIAL

## 2022-11-17 VITALS
WEIGHT: 146 LBS | BODY MASS INDEX: 24.92 KG/M2 | TEMPERATURE: 97.1 F | SYSTOLIC BLOOD PRESSURE: 128 MMHG | HEIGHT: 64 IN | DIASTOLIC BLOOD PRESSURE: 70 MMHG

## 2022-11-17 DIAGNOSIS — G89.29 CHRONIC PAIN OF LEFT KNEE: ICD-10-CM

## 2022-11-17 DIAGNOSIS — F11.90 CHRONIC, CONTINUOUS USE OF OPIOIDS: ICD-10-CM

## 2022-11-17 DIAGNOSIS — M47.817 LUMBOSACRAL SPONDYLOSIS WITHOUT MYELOPATHY: ICD-10-CM

## 2022-11-17 DIAGNOSIS — R10.11 ABDOMINAL PAIN, CHRONIC, RIGHT UPPER QUADRANT: ICD-10-CM

## 2022-11-17 DIAGNOSIS — M46.1 SACROILIAC INFLAMMATION (HCC): ICD-10-CM

## 2022-11-17 DIAGNOSIS — G89.29 ABDOMINAL PAIN, CHRONIC, RIGHT UPPER QUADRANT: ICD-10-CM

## 2022-11-17 DIAGNOSIS — M25.562 CHRONIC PAIN OF LEFT KNEE: ICD-10-CM

## 2022-11-17 PROBLEM — J31.0 CHRONIC RHINITIS: Status: ACTIVE | Noted: 2022-11-17

## 2022-11-17 PROBLEM — K76.0 NONALCOHOLIC FATTY LIVER DISEASE: Status: ACTIVE | Noted: 2022-11-17

## 2022-11-17 PROBLEM — R05.3 CHRONIC COUGH: Status: ACTIVE | Noted: 2022-11-17

## 2022-11-17 PROCEDURE — G8419 CALC BMI OUT NRM PARAM NOF/U: HCPCS | Performed by: NURSE PRACTITIONER

## 2022-11-17 PROCEDURE — 4004F PT TOBACCO SCREEN RCVD TLK: CPT | Performed by: NURSE PRACTITIONER

## 2022-11-17 PROCEDURE — G8427 DOCREV CUR MEDS BY ELIG CLIN: HCPCS | Performed by: NURSE PRACTITIONER

## 2022-11-17 PROCEDURE — G8484 FLU IMMUNIZE NO ADMIN: HCPCS | Performed by: NURSE PRACTITIONER

## 2022-11-17 PROCEDURE — 99214 OFFICE O/P EST MOD 30 MIN: CPT | Performed by: NURSE PRACTITIONER

## 2022-11-17 PROCEDURE — 3017F COLORECTAL CA SCREEN DOC REV: CPT | Performed by: NURSE PRACTITIONER

## 2022-11-17 RX ORDER — HYDROCODONE BITARTRATE AND ACETAMINOPHEN 5; 325 MG/1; MG/1
1 TABLET ORAL 3 TIMES DAILY PRN
Qty: 90 TABLET | Refills: 0 | Status: SHIPPED | OUTPATIENT
Start: 2022-11-23 | End: 2022-12-23

## 2022-11-17 ASSESSMENT — ENCOUNTER SYMPTOMS
GASTROINTESTINAL NEGATIVE: 1
COUGH: 0
TROUBLE SWALLOWING: 0
BACK PAIN: 1
SHORTNESS OF BREATH: 0
DIARRHEA: 0
CONSTIPATION: 0
EYES NEGATIVE: 1

## 2022-11-17 NOTE — PROGRESS NOTES
Jaimie RAGSDALE Monday  (1965)    11/17/2022    Subjective:     Bertrand Dyson Monday is 56 y.o. female who complains today of:    Chief Complaint   Patient presents with    Back Pain         Allergies:  Augmentin [amoxicillin-pot clavulanate], Dye [iodides], Macrobid [nitrofurantoin monohydrate macrocrystals], and Flagyl [metronidazole]    Past Medical History:   Diagnosis Date    Anxiety     GERD (gastroesophageal reflux disease)     History of cellulitis and abscess     Irritable bowel syndrome      Past [facility] History:   [facility]    [address] [facility]    HYSTERECTOMY (CERVIX STATUS UNKNOWN)  2009    OTHER SURGICAL HISTORY  02/11/15    DR Christopher Pulido  EXCISION OF LT LOWER QUAD NONHEALING SKIN LESION    SMALL INTESTINE SURGERY  2012    bowel obstruction @ [address] [place] [address] Dr. Vandana Gordillo  [id number]    DR. MAGALLON     Family History   Problem Relation Age of Onset    Diabetes Mother     Heart Disease Mother         stints    Diabetes Sister     High Blood Pressure Sister      Social History     Socioeconomic History    Marital status:      Spouse name: Not on file    Number of children: Not on file    Years of education: Not on file    Highest education level: Not on file   Occupational History    Not on file   Tobacco Use    Smoking status: Some Days     Packs/day: 0.25     Years: 15.00     Pack years: 3.75     Types: Cigarettes    Smokeless tobacco: Never    Tobacco comments:     4/day    Substance and Sexual Activity    Alcohol use: No    Drug use: No    Sexual activity: Not on file   Other Topics Concern    Not on file   Social History Narrative    Not on file     Social Determinants of Health     Financial Resource Strain: Not on file   Food Insecurity: Not on file   Transportation Needs: Not on file   Physical Activity: Not on file   Stress: Not on file   Social Connections: Not on file   Intimate Partner Violence: Not on file   Housing Stability: Not on file       Current Outpatient Medications on File Prior to Visit   Medication Sig Dispense Refill    lidocaine (LMX) 4 % cream Apply a half dollar sized amount to intact skin topically up to twice daily as needed for pain 1 Tube 1    vitamin D (ERGOCALCIFEROL) 15032 units CAPS capsule Take 1 capsule by mouth once a week 12 capsule 1    magnesium oxide (MAG-OX) 400 (240 Mg) MG tablet Take 1 tablet by mouth daily 30 tablet 0     No current facility-administered medications on file prior to visit. Pt presents today for a f/u of her pain. PCP is Dr. Kesha Cee DO. Pt last saw this NP for chronic left knee, abdominal (R>L), and mid back pain. She was given SI joint exercises and says they have helped. She is having more pain from the weather change and her fibromyalgia recently she thinks. She started taking some B12 and other vitamins and continues to use these. MRI left knee 9/9 positive for tendinosis, no ligament or meniscus tearing. She uses biofreeze which helps for several hours. Abdomen pain has been a constant ache since undergoing abdominal surgery mesh surgical placement after incarcerated spigelian hernia repair on July 23, 2012 by Dr. Isidro Peralta. Was told mesh cannot be taken out due to scar tissue. She helps care for her mom and has permanent custody of her grand children she says. Says she was told in the past she has fibromyalgia. She says she does have Lt thigh numbness but hasn't changed. She uses Norco 5mg TID PRN pain. She will alternate with motrin and tylenol as well. She notices pain worse in abdomen with less medication. Pt feels pain level with her medication is 5/10, and worse without medication. Pt feels that weather change, stress, caring for family/home makes the pain worse, and medication, stretching makes the pain better. Pt feels her medication helps   her function and improve her quality of life, specifically says allows to do ADL's, cook.  Pt denies change in Lt thigh numbness that comes and goes. Denies recent falls, injuries or trauma. Pt denies new weakness. Pt reports PT has been tried. Review of Systems   Constitutional: Negative. Negative for fatigue. HENT: Negative. Negative for trouble swallowing. Eyes: Negative. Respiratory:  Negative for cough and shortness of breath. Cardiovascular:  Negative for chest pain. Gastrointestinal: Negative. Negative for constipation and diarrhea. Endocrine: Negative. Genitourinary: Negative. Musculoskeletal:  Positive for arthralgias and back pain. Skin: Negative. Neurological:  Positive for numbness. Negative for dizziness, weakness and headaches. Hematological: Negative. Psychiatric/Behavioral: Negative. Reviewed Dr. Hough Proffer notes and reports:  XR L hip 6/29/2021: Other moderate degenerative changes bilaterally symmetric, joint space is preserved. No fracture. XR L knee 6/29/2021: Joint spaces preserved. XR C-spine 6/3/2020: Displaced heights maintained, no fracture, facet arthropathy, instability C3-C6. XR L-spine 6/3/2020: Degenerative disc disease, facet arthropathy, no fracture. XR L knee 6/3/2020 standing films demonstrate preservation of joint spaces, no fracture  MRI R hip 3/12/2020: Small osteophytes, degenerative changes within the labrum. Left hip unremarkable. Normal SI joints. Minimal degenerative changes right hip, no fracture. MRI lumbar spine 8/6/19: No fracture. T12-L1, L1 L2, L2 L3, L3 L4, L4-L5 normal canal and foramen. L5-S1 mild canal stenosis, normal foramen. XR R Hip 12/26/18: hip joint spaces maintained. No fracture. EMG B LE 1/10/19: This study is limited, but normal. Although limited, there is no clear electrodiagnostic evidence for a generalized large fiber sensorimotor peripheral polyneuropathy or active lumbosacral motor radiculopathy.   XR LS Spine 6/1/18: degenerative disc disease, lumbar facet arthropathy, no fracture. CT Abd Pelvis 4/14/18: stable right lower lung nodule, unchanged May 16. Hysterectomy. Remote RLQ and Left anterior abd wall surgical procedures. CT Abd Pelvis 5/23/16: lung nodules. Hepatic steatosis. Gallbladder sludge. Right kidney calcification. Non obstructing right renal calculi     UDS 6/0/0947: Free of illicits, consistent with [place]  UDS 6/3/2020: Free of substances, consistent with [place]  UDS 1/31/88: free of illicits, consistent with Norco.  Opioid risk tool score 0, low risk  Objective:     Vitals:  /70   Temp 97.1 °F (36.2 °C)   Ht 5' 4\" (1.626 m)   Wt 146 lb (66.2 kg)   LMP 01/01/2009   BMI 25.06 kg/m² Pain Score:   5      Physical Exam  Vitals and nursing note reviewed. This is a pleasant female who answers questions appropriately and follows commands. Pt is alert and oriented x 3. Recent and remote memory is intact. Mood and affect, judgement and insight are normal.  No signs of distress, no dyspnea or SOB noted. HEENT: PERRL. Neck is supple, trachea midline. No lymphadenopathy noted. Abdomin soft and round. Tender with palpation  LLQ. SLR increases LLQ pain. Surgical scars noted. Decreased ROM with flexion and extension of low back. Tender with palpation to lumbar spine with palpation. Tender with palpation over Rt SI  joint and positive Orestes's test.   Decreased ROM Lt hip with discomfort. Tightness in both hamstrings noted. Balance and coordination normal.  Strength is functional for ambulation. Cranial nerves II-XII are intact. Assessment:      Diagnosis Orders   1. Abdominal pain, chronic, right upper quadrant  HYDROcodone-acetaminophen (NORCO) 5-325 MG per tablet      2. Lumbosacral spondylosis without myelopathy  HYDROcodone-acetaminophen (NORCO) 5-325 MG per tablet      3. Chronic, continuous use of opioids  HYDROcodone-acetaminophen (NORCO) 5-325 MG per tablet      4.  Chronic pain of left knee  HYDROcodone-acetaminophen (NORCO) 5-325 MG per tablet      5. Sacroiliac inflammation (HCC)  HYDROcodone-acetaminophen (NORCO) 5-325 MG per tablet          Plan:     Periodic Controlled Substance Monitoring: Possible medication side effects, risk of tolerance/dependence & alternative treatments discussed., No signs of potential drug abuse or diversion identified. , Assessed functional status., Obtaining appropriate analgesic effect of treatment. (Alina Davis, APRN - CNP)    Orders Placed This Encounter   Medications    HYDROcodone-acetaminophen (NORCO) 5-325 MG per tablet     Sig: Take 1 tablet by mouth 3 times daily as needed for Pain for up to 30 days. Dispense:  90 tablet     Refill:  0     Reduce doses taken as pain becomes manageable       No orders of the defined types were placed in this encounter. Discussed options with the patient today. Anatomic model pathology was shown and reviewed with pt. Will continue her current dose of Norco 5mg TID PRN pain as it helps her function. Options are limited. She is not wanting injections. Continue HEP  All questions were answered. Discussed home exercise program.  Relevant imaging and pain generators reviewed. Pt verbalized understanding and agrees with above plan. Pt has chronic pain. Utox reviewed and appropriate from October 2022. ORT score 0 - low risk. Narcan Rx sent in May 2021. Will continue medications for chronic pain that has been previously directed as they do help pt function with ADL and improve quality of life. Pt is aware goal is to use the least amount of medication possible to allow pt to function and help with quality of life as discussed in detail. Ideal to keep MME below 50 which it is. Have discussed proper disposal of narcotic medication. Advised to have medications in safe place and locked up/in lock box.   Discussed possible risks of opiate medication with pt, including, but not limited to possible constipation, nausea or vomiting, sedation, urinary retention, dependence and possible addiction. Pt agrees to use medication as prescribed/as directed. Pt advised to not use opiates while driving or operating heavy equipment, or in situations where pt may harm him/herself or others. Pt is aware that while on narcotics, pt needs to be seen to reassess pain and reassess need for continued medication at that time. NDP reviewed. OARRS was reviewed. This NP saw pt under direct supervision of Dr. Jade Wells. Follow up:  Return in about 4 weeks (around 12/15/2022) for review meds and reassess pain.     Sera Clark, APRN - CNP

## 2022-12-20 ENCOUNTER — OFFICE VISIT (OUTPATIENT)
Dept: PAIN MANAGEMENT | Age: 57
End: 2022-12-20
Payer: COMMERCIAL

## 2022-12-20 VITALS
BODY MASS INDEX: 24.92 KG/M2 | HEART RATE: 76 BPM | HEIGHT: 64 IN | DIASTOLIC BLOOD PRESSURE: 80 MMHG | WEIGHT: 146 LBS | OXYGEN SATURATION: 97 % | SYSTOLIC BLOOD PRESSURE: 118 MMHG | TEMPERATURE: 97.1 F

## 2022-12-20 DIAGNOSIS — M46.1 SACROILIAC INFLAMMATION (HCC): ICD-10-CM

## 2022-12-20 DIAGNOSIS — G89.29 CHRONIC PAIN OF LEFT KNEE: ICD-10-CM

## 2022-12-20 DIAGNOSIS — G89.29 ABDOMINAL PAIN, CHRONIC, RIGHT UPPER QUADRANT: ICD-10-CM

## 2022-12-20 DIAGNOSIS — R10.11 ABDOMINAL PAIN, CHRONIC, RIGHT UPPER QUADRANT: ICD-10-CM

## 2022-12-20 DIAGNOSIS — M47.817 LUMBOSACRAL SPONDYLOSIS WITHOUT MYELOPATHY: Primary | ICD-10-CM

## 2022-12-20 DIAGNOSIS — M79.7 FIBROMYALGIA: ICD-10-CM

## 2022-12-20 DIAGNOSIS — F11.90 CHRONIC, CONTINUOUS USE OF OPIOIDS: ICD-10-CM

## 2022-12-20 DIAGNOSIS — M25.562 CHRONIC PAIN OF LEFT KNEE: ICD-10-CM

## 2022-12-20 PROCEDURE — G8484 FLU IMMUNIZE NO ADMIN: HCPCS | Performed by: NURSE PRACTITIONER

## 2022-12-20 PROCEDURE — 4004F PT TOBACCO SCREEN RCVD TLK: CPT | Performed by: NURSE PRACTITIONER

## 2022-12-20 PROCEDURE — 99214 OFFICE O/P EST MOD 30 MIN: CPT | Performed by: NURSE PRACTITIONER

## 2022-12-20 PROCEDURE — 3017F COLORECTAL CA SCREEN DOC REV: CPT | Performed by: NURSE PRACTITIONER

## 2022-12-20 PROCEDURE — G8427 DOCREV CUR MEDS BY ELIG CLIN: HCPCS | Performed by: NURSE PRACTITIONER

## 2022-12-20 PROCEDURE — G8419 CALC BMI OUT NRM PARAM NOF/U: HCPCS | Performed by: NURSE PRACTITIONER

## 2022-12-20 RX ORDER — HYDROCODONE BITARTRATE AND ACETAMINOPHEN 5; 325 MG/1; MG/1
1 TABLET ORAL 3 TIMES DAILY PRN
Qty: 90 TABLET | Refills: 0 | Status: SHIPPED | OUTPATIENT
Start: 2022-12-23 | End: 2023-01-22

## 2022-12-20 RX ORDER — DULOXETIN HYDROCHLORIDE 20 MG/1
20 CAPSULE, DELAYED RELEASE ORAL DAILY
Qty: 30 CAPSULE | Refills: 0 | Status: SHIPPED | OUTPATIENT
Start: 2022-12-20 | End: 2023-01-19

## 2022-12-20 ASSESSMENT — ENCOUNTER SYMPTOMS
DIARRHEA: 0
SHORTNESS OF BREATH: 0
COUGH: 0
GASTROINTESTINAL NEGATIVE: 1
CONSTIPATION: 0
BACK PAIN: 1
EYES NEGATIVE: 1
TROUBLE SWALLOWING: 0

## 2022-12-20 NOTE — PROGRESS NOTES
Jaimie RAGSDALE Monday  (1965)    12/20/2022    Subjective:     Ivonne Keith Monday is 56 y.o. female who complains today of:    Chief Complaint   Patient presents with    Back Pain         Allergies:  Augmentin [amoxicillin-pot clavulanate], Dye [iodides], Macrobid [nitrofurantoin monohydrate macrocrystals], and Flagyl [metronidazole]    Past Medical History:   Diagnosis Date    Anxiety     GERD (gastroesophageal reflux disease)     History of cellulitis and abscess     Irritable bowel syndrome      Past Surgical History:   Procedure Laterality Date    [address] [facility] [place], 2012    HYSTERECTOMY (CERVIX STATUS UNKNOWN)  2009    OTHER SURGICAL HISTORY  02/11/15    DR Velma Benz  EXCISION OF LT LOWER QUAD NONHEALING SKIN LESION    SMALL INTESTINE SURGERY  2012    bowel obstruction @ [address] [place] [address] Dr. Franco Adler  [id number]    DR. MAGALLON     Family History   Problem Relation Age of Onset    Diabetes Mother     Heart Disease Mother         stints    Diabetes Sister     High Blood Pressure Sister      Social History     Socioeconomic History    Marital status:      Spouse name: Not on file    Number of children: Not on file    Years of education: Not on file    Highest education level: Not on file   Occupational History    Not on file   Tobacco Use    Smoking status: Some Days     Packs/day: 0.25     Years: 15.00     Pack years: 3.75     Types: Cigarettes    Smokeless tobacco: Never    Tobacco comments:     4/day    Substance and Sexual Activity    Alcohol use: No    Drug use: No    Sexual activity: Not on file   Other Topics Concern    Not on file   Social History Narrative    Not on file     Social Determinants of Health     Financial Resource Strain: Not on file   Food Insecurity: Not on file   Transportation Needs: Not on file   Physical Activity: Not on file   Stress: Not on file   Social Connections: Not on file   Intimate Partner Violence: Not on file   Housing Stability: Not on file       Current Outpatient Medications on File Prior to Visit   Medication Sig Dispense Refill    lidocaine (LMX) 4 % cream Apply a half dollar sized amount to intact skin topically up to twice daily as needed for pain 1 Tube 1    vitamin D (ERGOCALCIFEROL) 62418 units CAPS capsule Take 1 capsule by mouth once a week 12 capsule 1    magnesium oxide (MAG-OX) 400 (240 Mg) MG tablet Take 1 tablet by mouth daily 30 tablet 0     No current facility-administered medications on file prior to visit. Pt presents today for a f/u of her pain. PCP is Dr. Jessica Oliver DO. Pt last saw this NP for chronic left knee, abdominal (R>L), and mid back pain. She says she has been \"sore\" recently. Been busy with [place]. She was given SI joint exercises and says they have helped. She is having more pain from the weather change and her fibromyalgia recently she thinks. She started taking some B12 and other vitamins and continues to use these. MRI left knee 9/9 positive for tendinosis, no ligament or meniscus tearing. She uses biofreeze which helps for several hours. Abdomen pain has been a constant ache since undergoing abdominal surgery mesh surgical placement after incarcerated spigelian hernia repair on July 23, 2012 by Dr. Santoro. Was told mesh cannot be taken out due to scar tissue. She helps care for her mom and has permanent custody of her grand children she says. Says she was told in the past she has fibromyalgia. She says she does have Lt thigh numbness but hasn't changed. She says she tried Lyrica and felt she had panic attacks with this. She uses Norco 5mg TID PRN pain. She will alternate with motrin and tylenol as well. She notices pain worse in abdomen with less medication. Pt feels pain level with her medication is 3/10, and 9/10 without medication. Pt feels that stress, weather change, sleep makes the pain worse, and medication, change of position makes the pain better. Pt feels her medication helps   her function and improve her quality of life, specifically says allows to move and care for family. Pt denies radiating numbness and tingling, but says gets throbbing/pain in legs at night  Denies recent falls, injuries or trauma. Pt denies new weakness. Pt reports PT has been tried. Review of Systems   Constitutional: Negative. Negative for fatigue. HENT: Negative. Negative for trouble swallowing. Eyes: Negative. Respiratory:  Negative for cough and shortness of breath. Cardiovascular:  Negative for chest pain. Gastrointestinal: Negative. Negative for constipation and diarrhea. Endocrine: Negative. Genitourinary: Negative. Musculoskeletal:  Positive for back pain and neck pain. Skin: Negative. Neurological:  Negative for dizziness, weakness and headaches. Hematological: Negative. Psychiatric/Behavioral: Negative. Reviewed Dr. Susie Sharp notes and reports:  XR L hip 6/29/2021: Other moderate degenerative changes bilaterally symmetric, joint space is preserved. No fracture. XR L knee 6/29/2021: Joint spaces preserved. XR C-spine 6/3/2020: Displaced heights maintained, no fracture, facet arthropathy, instability C3-C6. XR L-spine 6/3/2020: Degenerative disc disease, facet arthropathy, no fracture. XR L knee 6/3/2020 standing films demonstrate preservation of joint spaces, no fracture  MRI R hip 3/12/2020: Small osteophytes, degenerative changes within the labrum. Left hip unremarkable. Normal SI joints. Minimal degenerative changes right hip, no fracture. MRI lumbar spine 8/6/19: No fracture. T12-L1, L1 L2, L2 L3, L3 L4, L4-L5 normal canal and foramen. L5-S1 mild canal stenosis, normal foramen. XR R Hip 12/26/18: hip joint spaces maintained. No fracture. EMG B LE 1/10/19:  This study is limited, but normal. Although limited, there is no clear electrodiagnostic evidence for a generalized large fiber sensorimotor peripheral polyneuropathy or active lumbosacral motor radiculopathy. XR LS Spine 6/1/18: degenerative disc disease, lumbar facet arthropathy, no fracture. CT Abd Pelvis 4/14/18: stable right lower lung nodule, unchanged May 16. Hysterectomy. Remote RLQ and Left anterior abd wall surgical procedures. CT Abd Pelvis 5/23/16: lung nodules. Hepatic steatosis. Gallbladder sludge. Right kidney calcification. Non obstructing right renal calculi     UDS 9/8/9655: Free of illicits, consistent with [place]  UDS 6/3/2020: Free of substances, consistent with [place]  UDS 1/24/79: free of illicits, consistent with Norco.  Opioid risk tool score 0, low risk  Objective:     Vitals:  /80 (Site: Right Upper Arm, Position: Sitting, Cuff Size: Small Adult)   Pulse 76   Temp 97.1 °F (36.2 °C) (Temporal)   Ht 5' 4\" (1.626 m)   Wt 146 lb (66.2 kg)   LMP 01/01/2009   SpO2 97%   BMI 25.06 kg/m² Pain Score:   3      Physical Exam  Vitals and nursing note reviewed. This is a pleasant female who answers questions appropriately and follows commands. Pt is alert and oriented x 3. Recent and remote memory is intact. Mood and affect, judgement and insight are normal.  No signs of distress, no dyspnea or SOB noted. HEENT: PERRL. Neck is supple, trachea midline. No lymphadenopathy noted. Abdomin soft and round. Tender with palpation LLQ. SLR increases LLQ pain. Surgical scars noted. Decreased ROM with flexion and extension of low back. Tender with palpation to lumbar spine with palpation. Tender with palpation over Rt SI  joint and positive Orestes's test.   Decreased ROM Lt hip with discomfort. Tightness in both hamstrings noted. Balance and coordination normal.  Strength is functional for ambulation. Cranial nerves II-XII are intact. Assessment:      Diagnosis Orders   1.  Lumbosacral spondylosis without myelopathy  HYDROcodone-acetaminophen (NORCO) 5-325 MG per tablet    DULoxetine (CYMBALTA) 20 MG extended release capsule 2. Abdominal pain, chronic, right upper quadrant  HYDROcodone-acetaminophen (NORCO) 5-325 MG per tablet      3. Chronic, continuous use of opioids  HYDROcodone-acetaminophen (NORCO) 5-325 MG per tablet      4. Chronic pain of left knee  HYDROcodone-acetaminophen (NORCO) 5-325 MG per tablet      5. Sacroiliac inflammation (HCC)  HYDROcodone-acetaminophen (NORCO) 5-325 MG per tablet      6. Fibromyalgia  DULoxetine (CYMBALTA) 20 MG extended release capsule          Plan:     Periodic Controlled Substance Monitoring: Possible medication side effects, risk of tolerance/dependence & alternative treatments discussed., No signs of potential drug abuse or diversion identified. , Assessed functional status., Obtaining appropriate analgesic effect of treatment. (Alina Davis, APRN - CNP)    Orders Placed This Encounter   Medications    HYDROcodone-acetaminophen (NORCO) 5-325 MG per tablet     Sig: Take 1 tablet by mouth 3 times daily as needed for Pain for up to 30 days. Dispense:  90 tablet     Refill:  0     Reduce doses taken as pain becomes manageable    DULoxetine (CYMBALTA) 20 MG extended release capsule     Sig: Take 1 capsule by mouth daily     Dispense:  30 capsule     Refill:  0       No orders of the defined types were placed in this encounter. Discussed options with the patient today. Anatomic model pathology was shown and reviewed with pt. Will start trial of cymbalta 20mg daily for pain and fibro- if worsening depression discontinue and discussed possible SE. Will continue her current dose of Norco 5mg TID PRN pain as it helps her function. Options are limited. She is not wanting injections. Continue HEP. All questions were answered. Discussed home exercise program.  Relevant imaging and pain generators reviewed. Pt verbalized understanding and agrees with above plan. Pt has chronic pain. Utox reviewed and appropriate from October 2022. ORT score 0 - low risk.  Narcan Rx sent in May 2021.    Will continue medications for chronic pain that has been previously directed as they do help pt function with ADL and improve quality of life. Pt is aware goal is to use the least amount of medication possible to allow pt to function and help with quality of life as discussed in detail. Ideal to keep MME below 50 which it is. Have discussed proper disposal of narcotic medication. Advised to have medications in safe place and locked up/in lock box. Discussed possible risks of opiate medication with pt, including, but not limited to possible constipation, nausea or vomiting, sedation, urinary retention, dependence and possible addiction. Pt agrees to use medication as prescribed/as directed. Pt advised to not use opiates while driving or operating heavy equipment, or in situations where pt may harm him/herself or others. Pt is aware that while on narcotics, pt needs to be seen to reassess pain and reassess need for continued medication at that time. NDP reviewed. OARRS was reviewed. This NP saw pt under direct supervision of Dr. Lisset Dillon. Follow up:  Return in about 4 weeks (around 1/17/2023) for review meds and reassess pain.     Joseluis Clark, APRN - CNP

## 2023-01-19 ENCOUNTER — OFFICE VISIT (OUTPATIENT)
Dept: PAIN MANAGEMENT | Age: 58
End: 2023-01-19
Payer: COMMERCIAL

## 2023-01-19 VITALS
SYSTOLIC BLOOD PRESSURE: 118 MMHG | WEIGHT: 147 LBS | TEMPERATURE: 96.9 F | HEIGHT: 63 IN | BODY MASS INDEX: 26.05 KG/M2 | DIASTOLIC BLOOD PRESSURE: 78 MMHG

## 2023-01-19 DIAGNOSIS — M46.1 SACROILIAC INFLAMMATION (HCC): ICD-10-CM

## 2023-01-19 DIAGNOSIS — G89.29 ABDOMINAL PAIN, CHRONIC, RIGHT UPPER QUADRANT: ICD-10-CM

## 2023-01-19 DIAGNOSIS — R10.11 ABDOMINAL PAIN, CHRONIC, RIGHT UPPER QUADRANT: ICD-10-CM

## 2023-01-19 DIAGNOSIS — F11.90 CHRONIC, CONTINUOUS USE OF OPIOIDS: ICD-10-CM

## 2023-01-19 DIAGNOSIS — M79.7 FIBROMYALGIA: ICD-10-CM

## 2023-01-19 DIAGNOSIS — G89.29 CHRONIC PAIN OF LEFT KNEE: ICD-10-CM

## 2023-01-19 DIAGNOSIS — M47.817 LUMBOSACRAL SPONDYLOSIS WITHOUT MYELOPATHY: ICD-10-CM

## 2023-01-19 DIAGNOSIS — M25.562 CHRONIC PAIN OF LEFT KNEE: ICD-10-CM

## 2023-01-19 PROCEDURE — 4004F PT TOBACCO SCREEN RCVD TLK: CPT | Performed by: NURSE PRACTITIONER

## 2023-01-19 PROCEDURE — G8427 DOCREV CUR MEDS BY ELIG CLIN: HCPCS | Performed by: NURSE PRACTITIONER

## 2023-01-19 PROCEDURE — 99214 OFFICE O/P EST MOD 30 MIN: CPT | Performed by: NURSE PRACTITIONER

## 2023-01-19 PROCEDURE — G8484 FLU IMMUNIZE NO ADMIN: HCPCS | Performed by: NURSE PRACTITIONER

## 2023-01-19 PROCEDURE — G8419 CALC BMI OUT NRM PARAM NOF/U: HCPCS | Performed by: NURSE PRACTITIONER

## 2023-01-19 PROCEDURE — 3017F COLORECTAL CA SCREEN DOC REV: CPT | Performed by: NURSE PRACTITIONER

## 2023-01-19 RX ORDER — DULOXETIN HYDROCHLORIDE 20 MG/1
20 CAPSULE, DELAYED RELEASE ORAL DAILY
Qty: 30 CAPSULE | Refills: 0 | Status: CANCELLED | OUTPATIENT
Start: 2023-01-19 | End: 2023-02-18

## 2023-01-19 RX ORDER — HYDROCODONE BITARTRATE AND ACETAMINOPHEN 5; 325 MG/1; MG/1
1 TABLET ORAL 3 TIMES DAILY PRN
Qty: 90 TABLET | Refills: 0 | Status: SHIPPED | OUTPATIENT
Start: 2023-01-22 | End: 2023-02-21

## 2023-01-19 ASSESSMENT — ENCOUNTER SYMPTOMS
DIARRHEA: 0
CONSTIPATION: 0
COUGH: 0
SHORTNESS OF BREATH: 0
EYES NEGATIVE: 1
ABDOMINAL PAIN: 1
TROUBLE SWALLOWING: 0
BACK PAIN: 1

## 2023-01-19 NOTE — PROGRESS NOTES
Jaimie RAGSDALE Monday  (1965)    1/19/2023    Subjective:     Maia Kellogg Monday is 56 y.o. female who complains today of:    Chief Complaint   Patient presents with    Shoulder Pain     Both shoulders    Back Pain         Allergies:  Augmentin [amoxicillin-pot clavulanate], Dye [iodides], Macrobid [nitrofurantoin monohydrate macrocrystals], and Flagyl [metronidazole]    Past Medical History:   Diagnosis Date    Anxiety     GERD (gastroesophageal reflux disease)     History of cellulitis and abscess     Irritable bowel syndrome      Past Surgical History:   Procedure Laterality Date    [address] [facility] [place], 2012    HYSTERECTOMY (CERVIX STATUS UNKNOWN)  2009    OTHER SURGICAL HISTORY  02/11/15    DR Perla Valle  EXCISION OF LT LOWER QUAD NONHEALING SKIN LESION    SMALL INTESTINE SURGERY  2012    bowel obstruction @ [address] [place] [address] Dr. Tobi Mckinnon  [id number]    DR. MAGALLON     Family History   Problem Relation Age of Onset    Diabetes Mother     Heart Disease Mother         stints    Diabetes Sister     High Blood Pressure Sister      Social History     Socioeconomic History    Marital status:      Spouse name: Not on file    Number of children: Not on file    Years of education: Not on file    Highest education level: Not on file   Occupational History    Not on file   Tobacco Use    Smoking status: Some Days     Packs/day: 0.25     Years: 15.00     Pack years: 3.75     Types: Cigarettes    Smokeless tobacco: Never    Tobacco comments:     4/day    Substance and Sexual Activity    Alcohol use: No    Drug use: No    Sexual activity: Not on file   Other Topics Concern    Not on file   Social History Narrative    Not on file     Social Determinants of Health     Financial Resource Strain: Not on file   Food Insecurity: Not on file   Transportation Needs: Not on file   Physical Activity: Not on file   Stress: Not on file   Social Connections: Not on file   Intimate Partner Violence: Not on file   Housing Stability: Not on file       Current Outpatient Medications on File Prior to Visit   Medication Sig Dispense Refill    DULoxetine (CYMBALTA) 20 MG extended release capsule Take 1 capsule by mouth daily 30 capsule 0    lidocaine (LMX) 4 % cream Apply a half dollar sized amount to intact skin topically up to twice daily as needed for pain 1 Tube 1    vitamin D (ERGOCALCIFEROL) 01341 units CAPS capsule Take 1 capsule by mouth once a week 12 capsule 1    magnesium oxide (MAG-OX) 400 (240 Mg) MG tablet Take 1 tablet by mouth daily 30 tablet 0     No current facility-administered medications on file prior to visit. Pt presents today for a f/u of her pain. PCP is Dr. Loren Ziegler DO. Pt last saw this NP for chronic left knee, abdominal (R>L), and mid back pain. She says she hasn't started her cymbalta d/t reading side effects. She is scared she will have a panic attack as she gets anxiety with new things. She has a lot on her plate. She was given SI joint exercises and says they have helped. She is having more pain from the weather change and her fibromyalgia recently she thinks. She started taking some B12 and other vitamins and continues to use these. MRI left knee 9/9 positive for tendinosis, no ligament or meniscus tearing. She uses biofreeze which helps for several hours. Abdomen pain has been a constant ache since undergoing abdominal surgery mesh surgical placement after incarcerated spigelian hernia repair on July 23, 2012 by Dr. Jarrell Erickson. Was told mesh cannot be taken out due to scar tissue. She helps care for her mom and has permanent custody of her grand children she says. Says she was told in the past she has fibromyalgia. She says she does have Lt thigh numbness but hasn't changed. She says she tried Lyrica and felt she had panic attacks with this. She uses Norco 5mg TID PRN pain. She will alternate with motrin and tylenol as well.   She notices pain worse in abdomen with less medication.    Pt feels pain level with her medication is 3/10, and worse without medication.  Pt feels that weather change, stress, sleep makes the pain worse, and change of position, position with pillows, norco makes the pain better.   Pt feels her medication helps   her function and improve her quality of life, specifically says allows to do ADL's. Pt admits to Lt thigh numbness but denies radiating numbness and tingling.  Denies recent falls, injuries or trauma.  Pt denies new weakness. Pt reports PT has been done.         Review of Systems   Constitutional: Negative.  Negative for fatigue.   HENT: Negative.  Negative for trouble swallowing.    Eyes: Negative.    Respiratory:  Negative for cough and shortness of breath.    Cardiovascular:  Negative for chest pain.   Gastrointestinal:  Positive for abdominal pain. Negative for constipation and diarrhea.   Endocrine: Negative.    Genitourinary: Negative.    Musculoskeletal:  Positive for arthralgias, back pain and neck pain.   Skin: Negative.    Neurological:  Negative for dizziness, weakness and headaches.   Hematological: Negative.    Psychiatric/Behavioral: Negative.       Reviewed Dr. Sullivan's notes and reports:  XR L hip 6/29/2021: Other moderate degenerative changes bilaterally symmetric, joint space is preserved.  No fracture.  XR L knee 6/29/2021: Joint spaces preserved.  XR C-spine 6/3/2020: Displaced heights maintained, no fracture, facet arthropathy, instability C3-C6.  XR L-spine 6/3/2020: Degenerative disc disease, facet arthropathy, no fracture.  XR L knee 6/3/2020 standing films demonstrate preservation of joint spaces, no fracture  MRI R hip 3/12/2020: Small osteophytes, degenerative changes within the labrum.  Left hip unremarkable.  Normal SI joints.  Minimal degenerative changes right hip, no fracture.  MRI lumbar spine 8/6/19: No fracture.  T12-L1, L1 L2, L2 L3, L3 L4, L4-L5 normal canal and foramen.  L5-S1 mild canal  stenosis, normal foramen. XR R Hip 12/26/18: hip joint spaces maintained. No fracture. EMG B LE 1/10/19: This study is limited, but normal. Although limited, there is no clear electrodiagnostic evidence for a generalized large fiber sensorimotor peripheral polyneuropathy or active lumbosacral motor radiculopathy. XR LS Spine 6/1/18: degenerative disc disease, lumbar facet arthropathy, no fracture. CT Abd Pelvis 4/14/18: stable right lower lung nodule, unchanged May 16. Hysterectomy. Remote RLQ and Left anterior abd wall surgical procedures. CT Abd Pelvis 5/23/16: lung nodules. Hepatic steatosis. Gallbladder sludge. Right kidney calcification. Non obstructing right renal calculi     UDS 8/4/9076: Free of illicits, consistent with [place]  UDS 6/3/2020: Free of substances, consistent with [place]  UDS 3/85/76: free of illicits, consistent with Norco.  Opioid risk tool score 0, low risk  Objective:     Vitals:  /78   Temp 96.9 °F (36.1 °C)   Ht 5' 3\" (1.6 m)   Wt 147 lb (66.7 kg)   LMP 01/01/2009   BMI 26.04 kg/m² Pain Score:   3      Physical Exam  Vitals and nursing note reviewed. This is a pleasant female who answers questions appropriately and follows commands. Pt is alert and oriented x 3. Recent and remote memory is intact. Mood and affect, judgement and insight are normal.  No signs of distress, no dyspnea or SOB noted. HEENT: PERRL. Neck is supple, trachea midline. No lymphadenopathy noted. Abdomin soft and round. Tender with palpation LLQ. SLR increases LLQ pain. Surgical scars noted. Decreased ROM with flexion and extension of low back. Tender with palpation to lumbar spine with palpation. Tender with palpation over Rt SI  joint and positive Orestes's test.   Decreased ROM Lt hip with discomfort. Tightness in both hamstrings noted. Balance and coordination normal.  Strength is functional for ambulation. Cranial nerves II-XII are intact. Assessment:      Diagnosis Orders   1. Abdominal pain, chronic, right upper quadrant  HYDROcodone-acetaminophen (NORCO) 5-325 MG per tablet      2. Lumbosacral spondylosis without myelopathy  HYDROcodone-acetaminophen (NORCO) 5-325 MG per tablet      3. Chronic, continuous use of opioids  HYDROcodone-acetaminophen (NORCO) 5-325 MG per tablet      4. Chronic pain of left knee  HYDROcodone-acetaminophen (NORCO) 5-325 MG per tablet      5. Sacroiliac inflammation (HCC)  HYDROcodone-acetaminophen (NORCO) 5-325 MG per tablet      6. Fibromyalgia            Plan:     Periodic Controlled Substance Monitoring: Possible medication side effects, risk of tolerance/dependence & alternative treatments discussed., No signs of potential drug abuse or diversion identified. , Assessed functional status., Obtaining appropriate analgesic effect of treatment. (Alina Davis, APRN - CNP)    Orders Placed This Encounter   Medications    HYDROcodone-acetaminophen (NORCO) 5-325 MG per tablet     Sig: Take 1 tablet by mouth 3 times daily as needed for Pain for up to 30 days. Dispense:  90 tablet     Refill:  0     Reduce doses taken as pain becomes manageable       No orders of the defined types were placed in this encounter. Discussed options with the patient today. Anatomic model pathology was shown and reviewed with pt. She will start cymbalta when feels comfortable to do so. Discussed possible SE as discussed. Will continue her current dose of Norco 5mg TID PRN pain as it helps her function. Options are limited. She is not wanting injections. Continue HEP. All questions were answered. Discussed home exercise program.  Relevant imaging and pain generators reviewed. Pt verbalized understanding and agrees with above plan. Pt has chronic pain. Utox reviewed and appropriate from October 2022. ORT score 0 - low risk. Narcan Rx sent in May 2021.     Will continue medications for chronic pain that has been previously directed as they do help pt function with ADL and improve quality of life. Pt is aware goal is to use the least amount of medication possible to allow pt to function and help with quality of life as discussed in detail. Ideal to keep MME below 50 which it is. Have discussed proper disposal of narcotic medication. Advised to have medications in safe place and locked up/in lock box. Discussed possible risks of opiate medication with pt, including, but not limited to possible constipation, nausea or vomiting, sedation, urinary retention, dependence and possible addiction. Pt agrees to use medication as prescribed/as directed. Pt advised to not use opiates while driving or operating heavy equipment, or in situations where pt may harm him/herself or others. Pt is aware that while on narcotics, pt needs to be seen to reassess pain and reassess need for continued medication at that time. NDP reviewed. OARRS was reviewed. This NP saw pt under direct supervision of Dr. Cynthia Caraballo. Follow up:  Return in about 4 weeks (around 2/16/2023) for review meds and reassess pain.     Nola Clark, APRN - CNP

## 2023-02-16 ENCOUNTER — OFFICE VISIT (OUTPATIENT)
Dept: PAIN MANAGEMENT | Age: 58
End: 2023-02-16
Payer: COMMERCIAL

## 2023-02-16 VITALS
DIASTOLIC BLOOD PRESSURE: 72 MMHG | WEIGHT: 147 LBS | TEMPERATURE: 97.2 F | HEIGHT: 63 IN | SYSTOLIC BLOOD PRESSURE: 128 MMHG | BODY MASS INDEX: 26.05 KG/M2

## 2023-02-16 DIAGNOSIS — M46.1 SACROILIAC INFLAMMATION (HCC): ICD-10-CM

## 2023-02-16 DIAGNOSIS — M25.562 CHRONIC PAIN OF LEFT KNEE: ICD-10-CM

## 2023-02-16 DIAGNOSIS — R10.11 ABDOMINAL PAIN, CHRONIC, RIGHT UPPER QUADRANT: ICD-10-CM

## 2023-02-16 DIAGNOSIS — M47.817 LUMBOSACRAL SPONDYLOSIS WITHOUT MYELOPATHY: ICD-10-CM

## 2023-02-16 DIAGNOSIS — G89.29 ABDOMINAL PAIN, CHRONIC, RIGHT UPPER QUADRANT: ICD-10-CM

## 2023-02-16 DIAGNOSIS — G89.29 CHRONIC PAIN OF LEFT KNEE: ICD-10-CM

## 2023-02-16 DIAGNOSIS — F11.90 CHRONIC, CONTINUOUS USE OF OPIOIDS: ICD-10-CM

## 2023-02-16 PROCEDURE — 4004F PT TOBACCO SCREEN RCVD TLK: CPT | Performed by: NURSE PRACTITIONER

## 2023-02-16 PROCEDURE — 3017F COLORECTAL CA SCREEN DOC REV: CPT | Performed by: NURSE PRACTITIONER

## 2023-02-16 PROCEDURE — G8419 CALC BMI OUT NRM PARAM NOF/U: HCPCS | Performed by: NURSE PRACTITIONER

## 2023-02-16 PROCEDURE — 99214 OFFICE O/P EST MOD 30 MIN: CPT | Performed by: NURSE PRACTITIONER

## 2023-02-16 PROCEDURE — G8484 FLU IMMUNIZE NO ADMIN: HCPCS | Performed by: NURSE PRACTITIONER

## 2023-02-16 PROCEDURE — G8427 DOCREV CUR MEDS BY ELIG CLIN: HCPCS | Performed by: NURSE PRACTITIONER

## 2023-02-16 RX ORDER — HYDROCODONE BITARTRATE AND ACETAMINOPHEN 5; 325 MG/1; MG/1
1 TABLET ORAL 3 TIMES DAILY PRN
Qty: 90 TABLET | Refills: 0 | Status: SHIPPED | OUTPATIENT
Start: 2023-02-21 | End: 2023-03-23

## 2023-02-16 ASSESSMENT — ENCOUNTER SYMPTOMS
BACK PAIN: 1
COUGH: 0
EYES NEGATIVE: 1
DIARRHEA: 0
CONSTIPATION: 0
SHORTNESS OF BREATH: 0
GASTROINTESTINAL NEGATIVE: 1
TROUBLE SWALLOWING: 0

## 2023-02-16 NOTE — PROGRESS NOTES
Jaimie RAGSDALE Monday  (1965)    2/16/2023    Subjective:     Maria Luisa Pressley Monday is 56 y.o. female who complains today of:    Chief Complaint   Patient presents with    Follow-up     Abdominal pain, chronic, right upper quadrant    Back Pain    Shoulder Pain     bilat         Allergies:  Augmentin [amoxicillin-pot clavulanate], Dye [iodides], Macrobid [nitrofurantoin monohydrate macrocrystals], and Flagyl [metronidazole]    Past Medical History:   Diagnosis Date    Anxiety     GERD (gastroesophageal reflux disease)     History of cellulitis and abscess     Irritable bowel syndrome      Past Surgical History:   Procedure Laterality Date    [id number] [facility], 2012    HYSTERECTOMY (CERVIX STATUS UNKNOWN)  2009    OTHER SURGICAL HISTORY  02/11/15    DR Ugarte  EXCISION OF LT LOWER QUAD NONHEALING SKIN LESION    SMALL INTESTINE SURGERY  2012    bowel obstruction @ [address] [place] [address] Dr. Angeles Gibson  [id number]    DR. MAGALLON     Family History   Problem Relation Age of Onset    Diabetes Mother     Heart Disease Mother         stints    Diabetes Sister     High Blood Pressure Sister      Social History     Socioeconomic History    Marital status:      Spouse name: Not on file    Number of children: Not on file    Years of education: Not on file    Highest education level: Not on file   Occupational History    Not on file   Tobacco Use    Smoking status: Some Days     Packs/day: 0.25     Years: 15.00     Pack years: 3.75     Types: Cigarettes    Smokeless tobacco: Never    Tobacco comments:     4/day    Substance and Sexual Activity    Alcohol use: No    Drug use: No    Sexual activity: Not on file   Other Topics Concern    Not on file   Social History Narrative    Not on file     Social Determinants of Health     Financial Resource Strain: Not on file   Food Insecurity: Not on file   Transportation Needs: Not on file   Physical Activity: Not on file   Stress: Not on file Social Connections: Not on file   Intimate Partner Violence: Not on file   Housing Stability: Not on file       Current Outpatient Medications on File Prior to Visit   Medication Sig Dispense Refill    lidocaine (LMX) 4 % cream Apply a half dollar sized amount to intact skin topically up to twice daily as needed for pain 1 Tube 1    vitamin D (ERGOCALCIFEROL) 08311 units CAPS capsule Take 1 capsule by mouth once a week 12 capsule 1    DULoxetine (CYMBALTA) 20 MG extended release capsule Take 1 capsule by mouth daily 30 capsule 0    magnesium oxide (MAG-OX) 400 (240 Mg) MG tablet Take 1 tablet by mouth daily 30 tablet 0     No current facility-administered medications on file prior to visit. Pt presents today for a f/u of her pain. PCP is Dr. Michael Valle DO. Pt last saw this NP for chronic left knee, abdominal (R>L), and mid back pain. She says her sciatic Sx started in Rt leg a few days ago. She says her dad recently had heart surgery and is caring for him. She has been putting on ANANYA hose on and off and doing a lot of bending and laundry. She says she did try cymbalta and \"worked fine\" but if she thinks about it she panics. She was given SI joint exercises and says they have helped. She is having more pain from the weather change and her fibromyalgia recently she thinks. She started taking some B12 and other vitamins and continues to use these. MRI left knee 9/9 positive for tendinosis, no ligament or meniscus tearing. She uses biofreeze which helps for several hours. Abdomen pain has been a constant ache since undergoing abdominal surgery mesh surgical placement after incarcerated spigelian hernia repair on July 23, 2012 by Dr. Santoro. Was told mesh cannot be taken out due to scar tissue. She helps care for her mom and has permanent custody of her grand children she says. Says she was told in the past she has fibromyalgia. She says she does have Lt thigh numbness but hasn't changed.    She says she tried Lyrica and felt she had panic attacks with this. She uses Norco 5mg TID PRN pain. She will alternate with motrin and tylenol as well. She notices pain worse in abdomen with less medication. Pt feels pain level with her medication is 2/10, and 9/10 without medication. Pt feels that bending, standing straight, weather change, laundry, caring of parents who live with her makes the pain worse, and heating pad, stretching, medication makes the pain better. Pt feels her medication helps   her function and improve her quality of life, specifically says allows to care for family and do ADL's. Pt admits to occasional Rt LE radiating numbness and tingling. Denies recent falls, injuries or trauma. Pt denies new weakness. Pt reports PT has been done in the past.         Review of Systems   Constitutional: Negative. Negative for fatigue. HENT: Negative. Negative for trouble swallowing. Eyes: Negative. Respiratory:  Negative for cough and shortness of breath. Cardiovascular:  Negative for chest pain. Gastrointestinal: Negative. Negative for constipation and diarrhea. Endocrine: Negative. Genitourinary: Negative. Musculoskeletal:  Positive for arthralgias and back pain. Skin: Negative. Neurological:  Positive for numbness. Negative for dizziness, weakness and headaches. Hematological: Negative. Psychiatric/Behavioral: Negative. Reviewed Dr. Laughlin List notes and reports:  XR L hip 6/29/2021: Other moderate degenerative changes bilaterally symmetric, joint space is preserved. No fracture. XR L knee 6/29/2021: Joint spaces preserved. XR C-spine 6/3/2020: Displaced heights maintained, no fracture, facet arthropathy, instability C3-C6. XR L-spine 6/3/2020: Degenerative disc disease, facet arthropathy, no fracture.   XR L knee 6/3/2020 standing films demonstrate preservation of joint spaces, no fracture  MRI R hip 3/12/2020: Small osteophytes, degenerative changes within the labrum. Left hip unremarkable. Normal SI joints. Minimal degenerative changes right hip, no fracture. MRI lumbar spine 8/6/19: No fracture. T12-L1, L1 L2, L2 L3, L3 L4, L4-L5 normal canal and foramen. L5-S1 mild canal stenosis, normal foramen. XR R Hip 12/26/18: hip joint spaces maintained. No fracture. EMG B LE 1/10/19: This study is limited, but normal. Although limited, there is no clear electrodiagnostic evidence for a generalized large fiber sensorimotor peripheral polyneuropathy or active lumbosacral motor radiculopathy. XR LS Spine 6/1/18: degenerative disc disease, lumbar facet arthropathy, no fracture. CT Abd Pelvis 4/14/18: stable right lower lung nodule, unchanged May 16. Hysterectomy. Remote RLQ and Left anterior abd wall surgical procedures. CT Abd Pelvis 5/23/16: lung nodules. Hepatic steatosis. Gallbladder sludge. Right kidney calcification. Non obstructing right renal calculi     UDS 2/7/0892: Free of illicits, consistent with [place]  UDS 6/3/2020: Free of substances, consistent with [place]  UDS 1/43/91: free of illicits, consistent with Norco.  Opioid risk tool score 0, low risk  Objective:     Vitals:  /72 (Site: Right Upper Arm, Position: Sitting)   Temp 97.2 °F (36.2 °C) (Temporal)   Ht 5' 3\" (1.6 m)   Wt 147 lb (66.7 kg)   LMP 01/01/2009   BMI 26.04 kg/m² Pain Score:   9      Physical Exam  Vitals and nursing note reviewed. This is a pleasant female who answers questions appropriately and follows commands. Pt is alert and oriented x 3. Recent and remote memory is intact. Mood and affect, judgement and insight are normal.  No signs of distress, no dyspnea or SOB noted. HEENT: PERRL. Neck is supple, trachea midline. No lymphadenopathy noted. Abdomin soft and round. Tender with palpation LLQ. Positive SLR on Rt today and increases LLQ pain. Surgical scars noted. Decreased ROM with flexion and extension of low back.   Tender with palpation to lumbar spine with palpation. Tender with palpation over Rt SI  joint and positive Orestes's test.   Decreased ROM Lt hip with discomfort. Tightness in both hamstrings noted. Balance and coordination normal.  Strength is functional for ambulation. Cranial nerves II-XII are intact. Assessment:      Diagnosis Orders   1. Abdominal pain, chronic, right upper quadrant  HYDROcodone-acetaminophen (NORCO) 5-325 MG per tablet      2. Lumbosacral spondylosis without myelopathy  HYDROcodone-acetaminophen (NORCO) 5-325 MG per tablet      3. Chronic, continuous use of opioids  HYDROcodone-acetaminophen (NORCO) 5-325 MG per tablet      4. Chronic pain of left knee  HYDROcodone-acetaminophen (NORCO) 5-325 MG per tablet      5. Sacroiliac inflammation (HCC)  HYDROcodone-acetaminophen (NORCO) 5-325 MG per tablet          Plan:     Periodic Controlled Substance Monitoring: Possible medication side effects, risk of tolerance/dependence & alternative treatments discussed., No signs of potential drug abuse or diversion identified. , Assessed functional status., Obtaining appropriate analgesic effect of treatment. (Alina Davis, APRN - CNP)    Orders Placed This Encounter   Medications    HYDROcodone-acetaminophen (NORCO) 5-325 MG per tablet     Sig: Take 1 tablet by mouth 3 times daily as needed for Pain for up to 30 days. Do not fill until 2/21/23 for 30 days     Dispense:  90 tablet     Refill:  0     Reduce doses taken as pain becomes manageable         No orders of the defined types were placed in this encounter. Discussed options with the patient today. Anatomic model pathology was shown and reviewed with pt. Will continue her current dose of Norco 5mg TID PRN pain as it helps her function. Options are limited. She is not wanting injections. Continue HEP. Offered referral to psychology, but pt declined. All questions were answered. Discussed home exercise program.  Relevant imaging and pain generators reviewed.  Pt verbalized understanding and agrees with above plan. Pt has chronic pain. Utox reviewed and appropriate from October 2022. ORT score 0 - low risk. Narcan Rx sent in May 2021. Will continue medications for chronic pain that has been previously directed as they do help pt function with ADL and improve quality of life. Pt is aware goal is to use the least amount of medication possible to allow pt to function and help with quality of life as discussed in detail. Ideal to keep MME below 50 which it is. Have discussed proper disposal of narcotic medication. Advised to have medications in safe place and locked up/in lock box. Discussed possible risks of opiate medication with pt, including, but not limited to possible constipation, nausea or vomiting, sedation, urinary retention, dependence and possible addiction. Pt agrees to use medication as prescribed/as directed. Pt advised to not use opiates while driving or operating heavy equipment, or in situations where pt may harm him/herself or others. Pt is aware that while on narcotics, pt needs to be seen to reassess pain and reassess need for continued medication at that time. NDP reviewed. OARRS was reviewed. This NP saw pt under direct supervision of Dr. Jane White. Follow up:  Return in about 4 weeks (around 3/16/2023) for review meds and reassess pain.     William Clark, IVÁN - CNP

## 2023-03-16 ENCOUNTER — OFFICE VISIT (OUTPATIENT)
Dept: PAIN MANAGEMENT | Age: 58
End: 2023-03-16
Payer: COMMERCIAL

## 2023-03-16 VITALS — BODY MASS INDEX: 25.1 KG/M2 | HEIGHT: 64 IN | WEIGHT: 147 LBS | TEMPERATURE: 97.1 F

## 2023-03-16 DIAGNOSIS — M46.1 SACROILIAC INFLAMMATION (HCC): ICD-10-CM

## 2023-03-16 DIAGNOSIS — M47.817 LUMBOSACRAL SPONDYLOSIS WITHOUT MYELOPATHY: ICD-10-CM

## 2023-03-16 DIAGNOSIS — F11.90 CHRONIC, CONTINUOUS USE OF OPIOIDS: ICD-10-CM

## 2023-03-16 DIAGNOSIS — M25.562 CHRONIC PAIN OF LEFT KNEE: ICD-10-CM

## 2023-03-16 DIAGNOSIS — G89.29 CHRONIC PAIN OF LEFT KNEE: ICD-10-CM

## 2023-03-16 DIAGNOSIS — R10.11 ABDOMINAL PAIN, CHRONIC, RIGHT UPPER QUADRANT: ICD-10-CM

## 2023-03-16 DIAGNOSIS — G89.29 ABDOMINAL PAIN, CHRONIC, RIGHT UPPER QUADRANT: ICD-10-CM

## 2023-03-16 PROCEDURE — G8484 FLU IMMUNIZE NO ADMIN: HCPCS | Performed by: NURSE PRACTITIONER

## 2023-03-16 PROCEDURE — G8419 CALC BMI OUT NRM PARAM NOF/U: HCPCS | Performed by: NURSE PRACTITIONER

## 2023-03-16 PROCEDURE — 99214 OFFICE O/P EST MOD 30 MIN: CPT | Performed by: NURSE PRACTITIONER

## 2023-03-16 PROCEDURE — 3017F COLORECTAL CA SCREEN DOC REV: CPT | Performed by: NURSE PRACTITIONER

## 2023-03-16 PROCEDURE — 4004F PT TOBACCO SCREEN RCVD TLK: CPT | Performed by: NURSE PRACTITIONER

## 2023-03-16 PROCEDURE — G8427 DOCREV CUR MEDS BY ELIG CLIN: HCPCS | Performed by: NURSE PRACTITIONER

## 2023-03-16 RX ORDER — HYDROCODONE BITARTRATE AND ACETAMINOPHEN 5; 325 MG/1; MG/1
1 TABLET ORAL 3 TIMES DAILY PRN
Qty: 90 TABLET | Refills: 0 | Status: SHIPPED | OUTPATIENT
Start: 2023-03-23 | End: 2023-04-22

## 2023-03-16 ASSESSMENT — ENCOUNTER SYMPTOMS
CONSTIPATION: 0
DIARRHEA: 0
SHORTNESS OF BREATH: 0
ABDOMINAL PAIN: 1
BACK PAIN: 1
COUGH: 0
EYES NEGATIVE: 1
TROUBLE SWALLOWING: 0

## 2023-03-16 NOTE — PROGRESS NOTES
Jaimie RAGSDALE Monday  (1965)    3/16/2023    Subjective:     Luis Villalta Monday is 56 y.o. female who complains today of:    Chief Complaint   Patient presents with    Back Pain    Abdominal Pain         Allergies:  Augmentin [amoxicillin-pot clavulanate], Dye [iodides], Macrobid [nitrofurantoin monohydrate macrocrystals], and Flagyl [metronidazole]    Past Medical History:   Diagnosis Date    Anxiety     GERD (gastroesophageal reflux disease)     History of cellulitis and abscess     Irritable bowel syndrome      Past Surgical History:   Procedure Laterality Date    [address] [facility], 2012    HYSTERECTOMY (CERVIX STATUS UNKNOWN)  2009    OTHER SURGICAL HISTORY  02/11/15    DR Judge Marc  EXCISION OF LT LOWER QUAD NONHEALING SKIN LESION    SMALL INTESTINE SURGERY  2012    bowel obstruction @ [address] [place] [address] Dr. Ashley Wolfe  [id number]    DR. MAGALLON     Family History   Problem Relation Age of Onset    Diabetes Mother     Heart Disease Mother         stints    Diabetes Sister     High Blood Pressure Sister      Social History     Socioeconomic History    Marital status:      Spouse name: Not on file    Number of children: Not on file    Years of education: Not on file    Highest education level: Not on file   Occupational History    Not on file   Tobacco Use    Smoking status: Some Days     Packs/day: 0.25     Years: 15.00     Pack years: 3.75     Types: Cigarettes    Smokeless tobacco: Never    Tobacco comments:     4/day    Substance and Sexual Activity    Alcohol use: No    Drug use: No    Sexual activity: Not on file   Other Topics Concern    Not on file   Social History Narrative    Not on file     Social Determinants of Health     Financial Resource Strain: Not on file   Food Insecurity: Not on file   Transportation Needs: Not on file   Physical Activity: Not on file   Stress: Not on file   Social Connections: Not on file   Intimate Partner Violence: Not on file   Housing Stability: Not on file       Current Outpatient Medications on File Prior to Visit   Medication Sig Dispense Refill    lidocaine (LMX) 4 % cream Apply a half dollar sized amount to intact skin topically up to twice daily as needed for pain 1 Tube 1    vitamin D (ERGOCALCIFEROL) 51105 units CAPS capsule Take 1 capsule by mouth once a week 12 capsule 1    DULoxetine (CYMBALTA) 20 MG extended release capsule Take 1 capsule by mouth daily 30 capsule 0    magnesium oxide (MAG-OX) 400 (240 Mg) MG tablet Take 1 tablet by mouth daily 30 tablet 0     No current facility-administered medications on file prior to visit. Pt presents today for a f/u of her pain. PCP is Dr. Yany Lomax DO. Pt last saw this NP for chronic left knee, abdominal (R>L), and mid back pain. She says her  bought her CBD gummies and hemp oil topical and she brings this in to see if ok to try. Says biofreeze isn't helping any more. There is no THC in the gummies according to product label. She has good and bad days. She can get throbbing in shoulders and back. She feels like sitting still can be worse for her. Change of position seems to help. She says her sciatic Sx started in Rt leg a few days ago. She says her dad recently had heart surgery and is caring for him. She will use ANANYA hose. She says she did try cymbalta and \"worked fine\" but if she thinks about it she panics. She was given SI joint exercises and says they have helped. Weather affects pain. She started taking some B12 and other vitamins and continues to use these. MRI left knee 9/9 positive for tendinosis, no ligament or meniscus tearing. Abdomen pain has been a constant ache since undergoing abdominal surgery mesh surgical placement after incarcerated spigelian hernia repair on July 23, 2012 by Dr. Santoro. Was told mesh cannot be taken out due to scar tissue. She helps care for her mom and has permanent custody of her grand children she says. Says she was told in the past she has fibromyalgia. She says she does have Lt thigh numbness but hasn't changed. She says she tried Lyrica and felt she had panic attacks with this. She uses Norco 5mg TID PRN pain. She will alternate with motrin and tylenol as well. She notices pain worse in abdomen with less medication. She wonders about increase of milligrams of norco - says tried taking 1 1/2 tab one day it it helped, lasted longer. Pt feels pain level with her medication is 6/10, and \"pretty bad\" without medication. Pt feels that prolonged sitting, lifting Rt arm, too much activity, weather change makes the pain worse, and medication, change of position makes the pain better. Pt feels her medication helps   her function and improve her quality of life, specifically says allows to do ADL's. Pt denies radiating numbness and tingling. Denies recent falls, injuries or trauma. Pt denies new weakness. Pt reports PT has been done. Review of Systems   Constitutional: Negative. Negative for fatigue. HENT: Negative. Negative for trouble swallowing. Eyes: Negative. Respiratory:  Negative for cough and shortness of breath. Cardiovascular:  Negative for chest pain. Gastrointestinal:  Positive for abdominal pain. Negative for constipation and diarrhea. Endocrine: Negative. Genitourinary: Negative. Musculoskeletal:  Positive for arthralgias, back pain and neck pain. Skin: Negative. Neurological:  Negative for dizziness, weakness and headaches. Hematological: Negative. Psychiatric/Behavioral: Negative. Reviewed Dr. Mickie Downs notes and reports:  XR L hip 6/29/2021: Other moderate degenerative changes bilaterally symmetric, joint space is preserved. No fracture. XR L knee 6/29/2021: Joint spaces preserved. XR C-spine 6/3/2020: Displaced heights maintained, no fracture, facet arthropathy, instability C3-C6.   XR L-spine 6/3/2020: Degenerative disc disease, facet arthropathy, no fracture. XR L knee 6/3/2020 standing films demonstrate preservation of joint spaces, no fracture  MRI R hip 3/12/2020: Small osteophytes, degenerative changes within the labrum. Left hip unremarkable. Normal SI joints. Minimal degenerative changes right hip, no fracture. MRI lumbar spine 8/6/19: No fracture. T12-L1, L1 L2, L2 L3, L3 L4, L4-L5 normal canal and foramen. L5-S1 mild canal stenosis, normal foramen. XR R Hip 12/26/18: hip joint spaces maintained. No fracture. EMG B LE 1/10/19: This study is limited, but normal. Although limited, there is no clear electrodiagnostic evidence for a generalized large fiber sensorimotor peripheral polyneuropathy or active lumbosacral motor radiculopathy. XR LS Spine 6/1/18: degenerative disc disease, lumbar facet arthropathy, no fracture. CT Abd Pelvis 4/14/18: stable right lower lung nodule, unchanged May 16. Hysterectomy. Remote RLQ and Left anterior abd wall surgical procedures. CT Abd Pelvis 5/23/16: lung nodules. Hepatic steatosis. Gallbladder sludge. Right kidney calcification. Non obstructing right renal calculi     UDS 8/8/8969: Free of illicits, consistent with [place]  UDS 6/3/2020: Free of substances, consistent with [place]  UDS 6/09/98: free of illicits, consistent with Norco.  Opioid risk tool score 0, low risk    Objective:     Vitals:  Temp 97.1 °F (36.2 °C)   Ht 5' 4\" (1.626 m)   Wt 147 lb (66.7 kg)   LMP 01/01/2009   BMI 25.23 kg/m² Pain Score:   6      Physical Exam  Vitals and nursing note reviewed. This is a pleasant female who answers questions appropriately and follows commands. Pt is alert and oriented x 3. Recent and remote memory is intact. Mood and affect, judgement and insight are normal.  No signs of distress, no dyspnea or SOB noted. HEENT: PERRL. Neck is supple, trachea midline. No lymphadenopathy noted. Abdomin soft and round. Tender with palpation LLQ. SLR increases LLQ pain. Surgical scars noted. Decreased ROM with flexion and extension of low back. Tender with palpation to lumbar spine with palpation. Tender with palpation over Rt SI  joint and positive Orestes's test.   Decreased ROM Lt hip with discomfort. Tightness in both hamstrings noted. Balance and coordination normal.  Strength is functional for ambulation. Cranial nerves II-XII are intact. Assessment:      Diagnosis Orders   1. Abdominal pain, chronic, right upper quadrant  HYDROcodone-acetaminophen (NORCO) 5-325 MG per tablet      2. Lumbosacral spondylosis without myelopathy  HYDROcodone-acetaminophen (NORCO) 5-325 MG per tablet      3. Chronic, continuous use of opioids  HYDROcodone-acetaminophen (NORCO) 5-325 MG per tablet      4. Chronic pain of left knee  HYDROcodone-acetaminophen (NORCO) 5-325 MG per tablet      5. Sacroiliac inflammation (HCC)  HYDROcodone-acetaminophen (NORCO) 5-325 MG per tablet          Plan:     Periodic Controlled Substance Monitoring: Possible medication side effects, risk of tolerance/dependence & alternative treatments discussed., No signs of potential drug abuse or diversion identified. , Assessed functional status., Obtaining appropriate analgesic effect of treatment. (Alina Davis, APRN - CNP)    Orders Placed This Encounter   Medications    HYDROcodone-acetaminophen (NORCO) 5-325 MG per tablet     Sig: Take 1 tablet by mouth 3 times daily as needed for Pain for up to 30 days. Do not fill until 3/23/23 for 30 days     Dispense:  90 tablet     Refill:  0     Reduce doses taken as pain becomes manageable         No orders of the defined types were placed in this encounter. Discussed options with the patient today. Anatomic model pathology was shown and reviewed with pt. Will continue her current dose of Norco 5mg TID PRN pain as it helps her function. Do not wish to increase her norco and discussed in detail. Use as directed. Ok to try the CBD products she brought in as no THC noted.  She needs to stop smoking. Options are limited. She is not wanting injections. Continue HEP. . All questions were answered. Discussed home exercise program.  Relevant imaging and pain generators reviewed. Pt verbalized understanding and agrees with above plan. Pt has chronic pain. Utox reviewed and appropriate from October 2022. ORT score 0 - low risk. Narcan Rx sent in May 2021. Will continue medications for chronic pain that has been previously directed as they do help pt function with ADL and improve quality of life. Pt is aware goal is to use the least amount of medication possible to allow pt to function and help with quality of life as discussed in detail. Ideal to keep MME below 50. Have discussed proper disposal of narcotic medication. Advised to have medications in safe place and locked up/in lock box. Discussed possible risks of opiate medication with pt, including, but not limited to possible constipation, nausea or vomiting, sedation, urinary retention, dependence and possible addiction. Pt agrees to use medication as prescribed/as directed. Pt advised to not use opiates while driving or operating heavy equipment, or in situations where pt may harm him/herself or others. Pt is aware that while on narcotics, pt needs to be seen to reassess pain and reassess need for continued medication at that time. NDP reviewed. OARRS was reviewed. This NP saw pt under direct supervision of Dr. Cody Lunsford. Follow up:  Return in about 5 weeks (around 4/20/2023) for review meds and reassess pain.     Garo Clark, APRN - CNP

## 2023-04-20 ENCOUNTER — OFFICE VISIT (OUTPATIENT)
Dept: PAIN MANAGEMENT | Age: 58
End: 2023-04-20
Payer: COMMERCIAL

## 2023-04-20 VITALS — BODY MASS INDEX: 24.92 KG/M2 | TEMPERATURE: 97.1 F | WEIGHT: 146 LBS | HEIGHT: 64 IN

## 2023-04-20 DIAGNOSIS — M46.1 SACROILIAC INFLAMMATION (HCC): ICD-10-CM

## 2023-04-20 DIAGNOSIS — M47.817 LUMBOSACRAL SPONDYLOSIS WITHOUT MYELOPATHY: Primary | ICD-10-CM

## 2023-04-20 DIAGNOSIS — G89.29 ABDOMINAL PAIN, CHRONIC, RIGHT UPPER QUADRANT: ICD-10-CM

## 2023-04-20 DIAGNOSIS — F11.90 CHRONIC, CONTINUOUS USE OF OPIOIDS: ICD-10-CM

## 2023-04-20 DIAGNOSIS — R10.11 ABDOMINAL PAIN, CHRONIC, RIGHT UPPER QUADRANT: ICD-10-CM

## 2023-04-20 DIAGNOSIS — M25.562 CHRONIC PAIN OF LEFT KNEE: ICD-10-CM

## 2023-04-20 DIAGNOSIS — G89.29 CHRONIC PAIN OF LEFT KNEE: ICD-10-CM

## 2023-04-20 PROCEDURE — 4004F PT TOBACCO SCREEN RCVD TLK: CPT | Performed by: NURSE PRACTITIONER

## 2023-04-20 PROCEDURE — G8419 CALC BMI OUT NRM PARAM NOF/U: HCPCS | Performed by: NURSE PRACTITIONER

## 2023-04-20 PROCEDURE — G8427 DOCREV CUR MEDS BY ELIG CLIN: HCPCS | Performed by: NURSE PRACTITIONER

## 2023-04-20 PROCEDURE — 3017F COLORECTAL CA SCREEN DOC REV: CPT | Performed by: NURSE PRACTITIONER

## 2023-04-20 PROCEDURE — 99214 OFFICE O/P EST MOD 30 MIN: CPT | Performed by: NURSE PRACTITIONER

## 2023-04-20 RX ORDER — HYDROCODONE BITARTRATE AND ACETAMINOPHEN 5; 325 MG/1; MG/1
1 TABLET ORAL 3 TIMES DAILY PRN
Qty: 90 TABLET | Refills: 0 | Status: SHIPPED | OUTPATIENT
Start: 2023-04-22 | End: 2023-05-22

## 2023-04-20 ASSESSMENT — ENCOUNTER SYMPTOMS
EYES NEGATIVE: 1
SHORTNESS OF BREATH: 0
TROUBLE SWALLOWING: 0
DIARRHEA: 0
BACK PAIN: 1
GASTROINTESTINAL NEGATIVE: 1
COUGH: 0
CONSTIPATION: 0

## 2023-04-20 NOTE — PROGRESS NOTES
Not on file   Housing Stability: Not on file       Current Outpatient Medications on File Prior to Visit   Medication Sig Dispense Refill    lidocaine (LMX) 4 % cream Apply a half dollar sized amount to intact skin topically up to twice daily as needed for pain 1 Tube 1    vitamin D (ERGOCALCIFEROL) 88480 units CAPS capsule Take 1 capsule by mouth once a week 12 capsule 1    DULoxetine (CYMBALTA) 20 MG extended release capsule Take 1 capsule by mouth daily 30 capsule 0    magnesium oxide (MAG-OX) 400 (240 Mg) MG tablet Take 1 tablet by mouth daily 30 tablet 0     No current facility-administered medications on file prior to visit. Pt presents today for a f/u of her pain. PCP is Dr. Mallory Novak DO. Pt last saw this NP for chronic left knee, abdominal (R>L), and mid back pain. She says her  bought her CBD gummies and hemp oil topical and says she started this and feels they have helped. She has had a good month she says. Says biofreeze isn't helping any more. There is no THC in the gummies according to product label. She has good and bad days. She can get throbbing in shoulders and back. She feels like sitting still can be worse for her. Change of position seems to help. She notices pain worse in abdomen with less medication. She says her sciatic Sx started in Rt leg a few days ago. She says her dad recently had heart surgery and is caring for him. She will use ANANYA hose. She says she did try cymbalta and \"worked fine\" but if she thinks about it she panics. She was given SI joint exercises and says they have helped. Weather affects pain. She started taking some B12 and other vitamins and continues to use these. MRI left knee 9/9 positive for tendinosis, no ligament or meniscus tearing. Abdomen pain has been a constant ache since undergoing abdominal surgery mesh surgical placement after incarcerated spigelian hernia repair on July 23, 2012 by Dr. Olga Lance.   Was told mesh cannot be taken

## 2023-05-11 ENCOUNTER — OFFICE VISIT (OUTPATIENT)
Dept: PRIMARY CARE | Facility: CLINIC | Age: 58
End: 2023-05-11
Payer: COMMERCIAL

## 2023-05-11 VITALS
SYSTOLIC BLOOD PRESSURE: 112 MMHG | DIASTOLIC BLOOD PRESSURE: 80 MMHG | BODY MASS INDEX: 26.94 KG/M2 | OXYGEN SATURATION: 96 % | HEIGHT: 64 IN | TEMPERATURE: 97.8 F | HEART RATE: 74 BPM | WEIGHT: 157.8 LBS | RESPIRATION RATE: 16 BRPM

## 2023-05-11 DIAGNOSIS — Z00.00 HEALTHCARE MAINTENANCE: Primary | ICD-10-CM

## 2023-05-11 DIAGNOSIS — Z12.31 ENCOUNTER FOR SCREENING MAMMOGRAM FOR MALIGNANT NEOPLASM OF BREAST: ICD-10-CM

## 2023-05-11 DIAGNOSIS — K21.9 GASTROESOPHAGEAL REFLUX DISEASE, UNSPECIFIED WHETHER ESOPHAGITIS PRESENT: ICD-10-CM

## 2023-05-11 PROCEDURE — 99396 PREV VISIT EST AGE 40-64: CPT | Performed by: FAMILY MEDICINE

## 2023-05-11 RX ORDER — PANTOPRAZOLE SODIUM 40 MG/1
40 TABLET, DELAYED RELEASE ORAL DAILY
Qty: 30 TABLET | Refills: 5 | Status: SHIPPED | OUTPATIENT
Start: 2023-05-11 | End: 2024-01-22

## 2023-05-11 RX ORDER — ERGOCALCIFEROL 1.25 MG/1
1 CAPSULE ORAL WEEKLY
COMMUNITY
Start: 2017-10-12

## 2023-05-11 RX ORDER — MULTIVITAMIN
1 TABLET ORAL DAILY
COMMUNITY

## 2023-05-11 RX ORDER — HYDROCODONE BITARTRATE AND ACETAMINOPHEN 5; 325 MG/1; MG/1
3 TABLET ORAL EVERY 6 HOURS PRN
COMMUNITY

## 2023-05-11 NOTE — PROGRESS NOTES
"Subjective   Patient ID: Apple ROMERO Monday is a 57 y.o. female who presents for GI Problem. annual exam and checkup  HPI     here for annual exam and checkup and some stomach issues    Pt is here for stomach issues x 2 month ago  Pt states that her stomach burns, and she has acid reflex,  Pt also gets bloated at time.  Pt is taking ani-acid chewable 2 x day    No other concern    Review of systems  ; Patient seen today for exam denies any problems with headaches or vision, denies any shortness of breath chest pain nausea or vomiting, no black stool no blood in the stool no heartburn type symptoms denies any problems with constipation or diarrhea, and no dysuria-type symptoms    The patient's allergies medications were reviewed with them today    The patient's social family and surgical history or also reviewed here today, along with her past medical history.     Objective     Alert and active in  no acute distress  HEENT TMs clear oropharynx negative nares clear no drainage noted neck supple  With no adenopathy   Heart regular rate and rhythm without murmur and no carotid bruits  Lungs- clear to auscultation bilaterally, no wheeze or rhonchi noted  Thyroid -negative masses or nodularity  Abdomen- soft times four quadrants , bowel sounds positive no masses or organomegaly, negative tenderness guarding or rebound  Neurological exam unremarkable- DTRs in upper and lower extremities within normal limits.   skin -no lesions noted      /80 (BP Location: Left arm, Patient Position: Sitting, BP Cuff Size: Adult)   Pulse 74   Temp 36.6 °C (97.8 °F) (Temporal)   Resp 16   Ht 1.626 m (5' 4\")   Wt 71.6 kg (157 lb 12.8 oz)   SpO2 96%   BMI 27.09 kg/m²     Allergies   Allergen Reactions    Nitrofurantoin Hives     Unsure of reaction    Nitrofurantoin Monohyd/M-Cryst Hives    Metronidazole Nausea And Vomiting       Assessment/Plan   Problem List Items Addressed This Visit    None  Visit Diagnoses       Healthcare " maintenance    -  Primary    Relevant Orders    Lipid Panel    CBC and Auto Differential    Comprehensive Metabolic Panel    Encounter for screening mammogram for malignant neoplasm of breast        Relevant Orders    BI mammo bilateral screening tomosynthesis    Gastroesophageal reflux disease, unspecified whether esophagitis present        Relevant Medications    pantoprazole (ProtoNix) 40 mg EC tablet           recommend she quit smoking once again get her mammograms and also   discussed colon screening   she was see if she does a medication does well great if not we will have her see GI for second opinion the Vicodin she takes for pain management deftly can cause constipation other she is also    If anything worsens or changes please call us at once, follow up in the office as planned,   Scribe Attestation  By signing my name below, I, Gila Day MA , Scribe   attest that this documentation has been prepared under the direction and in the presence of Ramiro Hooks DO.

## 2023-05-18 ENCOUNTER — OFFICE VISIT (OUTPATIENT)
Dept: PAIN MANAGEMENT | Age: 58
End: 2023-05-18
Payer: COMMERCIAL

## 2023-05-18 VITALS
BODY MASS INDEX: 24.92 KG/M2 | SYSTOLIC BLOOD PRESSURE: 126 MMHG | TEMPERATURE: 96.5 F | HEIGHT: 64 IN | WEIGHT: 146 LBS | DIASTOLIC BLOOD PRESSURE: 74 MMHG

## 2023-05-18 DIAGNOSIS — M47.817 LUMBOSACRAL SPONDYLOSIS WITHOUT MYELOPATHY: ICD-10-CM

## 2023-05-18 DIAGNOSIS — G89.29 ABDOMINAL PAIN, CHRONIC, RIGHT UPPER QUADRANT: ICD-10-CM

## 2023-05-18 DIAGNOSIS — M25.562 CHRONIC PAIN OF LEFT KNEE: ICD-10-CM

## 2023-05-18 DIAGNOSIS — M46.1 SACROILIAC INFLAMMATION (HCC): ICD-10-CM

## 2023-05-18 DIAGNOSIS — R10.11 ABDOMINAL PAIN, CHRONIC, RIGHT UPPER QUADRANT: ICD-10-CM

## 2023-05-18 DIAGNOSIS — G89.29 CHRONIC PAIN OF LEFT KNEE: ICD-10-CM

## 2023-05-18 DIAGNOSIS — F11.90 CHRONIC, CONTINUOUS USE OF OPIOIDS: ICD-10-CM

## 2023-05-18 PROCEDURE — 4004F PT TOBACCO SCREEN RCVD TLK: CPT | Performed by: NURSE PRACTITIONER

## 2023-05-18 PROCEDURE — 3017F COLORECTAL CA SCREEN DOC REV: CPT | Performed by: NURSE PRACTITIONER

## 2023-05-18 PROCEDURE — 99214 OFFICE O/P EST MOD 30 MIN: CPT | Performed by: NURSE PRACTITIONER

## 2023-05-18 PROCEDURE — G8419 CALC BMI OUT NRM PARAM NOF/U: HCPCS | Performed by: NURSE PRACTITIONER

## 2023-05-18 PROCEDURE — G8427 DOCREV CUR MEDS BY ELIG CLIN: HCPCS | Performed by: NURSE PRACTITIONER

## 2023-05-18 RX ORDER — HYDROCODONE BITARTRATE AND ACETAMINOPHEN 5; 325 MG/1; MG/1
1 TABLET ORAL 3 TIMES DAILY PRN
Qty: 90 TABLET | Refills: 0 | Status: SHIPPED | OUTPATIENT
Start: 2023-05-22 | End: 2023-06-21

## 2023-05-18 RX ORDER — PANTOPRAZOLE SODIUM 40 MG/1
40 TABLET, DELAYED RELEASE ORAL DAILY
COMMUNITY
Start: 2023-05-11

## 2023-05-18 ASSESSMENT — ENCOUNTER SYMPTOMS
SHORTNESS OF BREATH: 0
BACK PAIN: 1
COUGH: 0
DIARRHEA: 0
EYES NEGATIVE: 1
TROUBLE SWALLOWING: 0
CONSTIPATION: 1

## 2023-05-18 NOTE — PROGRESS NOTES
Jaimie RAGSDALE Monday  (1965)    5/18/2023    Subjective:     Cheryle Pesa Monday is 56 y.o. female who complains today of:    Chief Complaint   Patient presents with    1 Month Follow-Up    Back Pain    Knee Pain     RIGHT         Allergies:  Augmentin [amoxicillin-pot clavulanate], Dye [iodides], Macrobid [nitrofurantoin monohydrate macrocrystals], and Flagyl [metronidazole]    Past Medical History:   Diagnosis Date    Anxiety     GERD (gastroesophageal reflux disease)     History of cellulitis and abscess     Irritable bowel syndrome      Past Surgical History:   Procedure Laterality Date    [address] [facility], 2012    HYSTERECTOMY (CERVIX STATUS UNKNOWN)  2009    OTHER SURGICAL HISTORY  02/11/15    DR Ugarte  EXCISION OF LT LOWER QUAD NONHEALING SKIN LESION    SMALL INTESTINE SURGERY  2012    bowel obstruction @ [address] [facility] [address] Kierra Adames  [id number]    DR. MAGALLON     Family History   Problem Relation Age of Onset    Diabetes Mother     Heart Disease Mother         stints    Diabetes Sister     High Blood Pressure Sister      Social History     Socioeconomic History    Marital status:      Spouse name: Not on file    Number of children: Not on file    Years of education: Not on file    Highest education level: Not on file   Occupational History    Not on file   Tobacco Use    Smoking status: Some Days     Packs/day: 0.25     Years: 15.00     Pack years: 3.75     Types: Cigarettes    Smokeless tobacco: Never    Tobacco comments:     4/day    Substance and Sexual Activity    Alcohol use: No    Drug use: No    Sexual activity: Not on file   Other Topics Concern    Not on file   Social History Narrative    Not on file     Social Determinants of Health     Financial Resource Strain: Not on file   Food Insecurity: Not on file   Transportation Needs: Not on file   Physical Activity: Not on file   Stress: Not on file   Social Connections: Not on file   Intimate

## 2023-06-20 ENCOUNTER — OFFICE VISIT (OUTPATIENT)
Dept: PAIN MANAGEMENT | Age: 58
End: 2023-06-20
Payer: COMMERCIAL

## 2023-06-20 VITALS
OXYGEN SATURATION: 98 % | TEMPERATURE: 96.9 F | WEIGHT: 147 LBS | BODY MASS INDEX: 25.1 KG/M2 | DIASTOLIC BLOOD PRESSURE: 86 MMHG | HEART RATE: 85 BPM | HEIGHT: 64 IN | SYSTOLIC BLOOD PRESSURE: 126 MMHG

## 2023-06-20 DIAGNOSIS — M47.817 LUMBOSACRAL SPONDYLOSIS WITHOUT MYELOPATHY: ICD-10-CM

## 2023-06-20 DIAGNOSIS — G89.29 CHRONIC PAIN OF LEFT KNEE: ICD-10-CM

## 2023-06-20 DIAGNOSIS — M46.1 SACROILIAC INFLAMMATION (HCC): ICD-10-CM

## 2023-06-20 DIAGNOSIS — G89.29 ABDOMINAL PAIN, CHRONIC, RIGHT UPPER QUADRANT: ICD-10-CM

## 2023-06-20 DIAGNOSIS — R10.11 ABDOMINAL PAIN, CHRONIC, RIGHT UPPER QUADRANT: ICD-10-CM

## 2023-06-20 DIAGNOSIS — M25.562 CHRONIC PAIN OF LEFT KNEE: ICD-10-CM

## 2023-06-20 DIAGNOSIS — F11.90 CHRONIC, CONTINUOUS USE OF OPIOIDS: ICD-10-CM

## 2023-06-20 PROCEDURE — 3017F COLORECTAL CA SCREEN DOC REV: CPT | Performed by: NURSE PRACTITIONER

## 2023-06-20 PROCEDURE — G8427 DOCREV CUR MEDS BY ELIG CLIN: HCPCS | Performed by: NURSE PRACTITIONER

## 2023-06-20 PROCEDURE — 4004F PT TOBACCO SCREEN RCVD TLK: CPT | Performed by: NURSE PRACTITIONER

## 2023-06-20 PROCEDURE — G8419 CALC BMI OUT NRM PARAM NOF/U: HCPCS | Performed by: NURSE PRACTITIONER

## 2023-06-20 PROCEDURE — 99214 OFFICE O/P EST MOD 30 MIN: CPT | Performed by: NURSE PRACTITIONER

## 2023-06-20 RX ORDER — HYDROCODONE BITARTRATE AND ACETAMINOPHEN 5; 325 MG/1; MG/1
1 TABLET ORAL 3 TIMES DAILY PRN
Qty: 90 TABLET | Refills: 0 | Status: SHIPPED | OUTPATIENT
Start: 2023-06-21 | End: 2023-07-21

## 2023-06-20 ASSESSMENT — ENCOUNTER SYMPTOMS
TROUBLE SWALLOWING: 0
DIARRHEA: 0
GASTROINTESTINAL NEGATIVE: 1
CONSTIPATION: 0
SHORTNESS OF BREATH: 0
EYES NEGATIVE: 1
BACK PAIN: 1
COUGH: 0

## 2023-06-20 NOTE — PROGRESS NOTES
Jaimie R Monday  (1965)    6/20/2023    Subjective:     Elsy Beasley Monday is 56 y.o. female who complains today of:    Chief Complaint   Patient presents with    Follow-up     Chronic abdominal pain, (R) upper quadrant         Allergies:  Augmentin [amoxicillin-pot clavulanate], Dye [iodides], Macrobid [nitrofurantoin monohydrate macrocrystals], and Flagyl [metronidazole]    Past Medical History:   Diagnosis Date    Anxiety     GERD (gastroesophageal reflux disease)     History of cellulitis and abscess     Irritable bowel syndrome      Past Surgical History:   Procedure Laterality Date    [address] [facility] [place], 2012    HYSTERECTOMY (CERVIX STATUS UNKNOWN)  2009    OTHER SURGICAL HISTORY  02/11/15    DR Ellie Rushing  EXCISION OF LT LOWER QUAD NONHEALING SKIN LESION    SMALL INTESTINE SURGERY  2012    bowel obstruction @ [address] [place] [address] Dr. Andria Martinez  [id number]    DR. MAGALLON     Family History   Problem Relation Age of Onset    Diabetes Mother     Heart Disease Mother         stints    Diabetes Sister     High Blood Pressure Sister      Social History     Socioeconomic History    Marital status:      Spouse name: Not on file    Number of children: Not on file    Years of education: Not on file    Highest education level: Not on file   Occupational History    Not on file   Tobacco Use    Smoking status: Some Days     Packs/day: 0.25     Years: 15.00     Pack years: 3.75     Types: Cigarettes     Start date: 1984     Passive exposure: Current    Smokeless tobacco: Never    Tobacco comments:     4/day - intermittent use   Vaping Use    Vaping Use: Never used   Substance and Sexual Activity    Alcohol use: No    Drug use: No    Sexual activity: Not on file   Other Topics Concern    Not on file   Social History Narrative    Not on file     Social Determinants of Health     Financial Resource Strain: Not on file   Food Insecurity: Not on file   Transportation Needs:

## 2023-07-18 DIAGNOSIS — M47.817 LUMBOSACRAL SPONDYLOSIS WITHOUT MYELOPATHY: ICD-10-CM

## 2023-07-18 DIAGNOSIS — F11.90 CHRONIC, CONTINUOUS USE OF OPIOIDS: ICD-10-CM

## 2023-07-18 DIAGNOSIS — M25.562 CHRONIC PAIN OF LEFT KNEE: ICD-10-CM

## 2023-07-18 DIAGNOSIS — M46.1 SACROILIAC INFLAMMATION (HCC): ICD-10-CM

## 2023-07-18 DIAGNOSIS — G89.29 CHRONIC PAIN OF LEFT KNEE: ICD-10-CM

## 2023-07-18 DIAGNOSIS — G89.29 ABDOMINAL PAIN, CHRONIC, RIGHT UPPER QUADRANT: ICD-10-CM

## 2023-07-18 DIAGNOSIS — R10.11 ABDOMINAL PAIN, CHRONIC, RIGHT UPPER QUADRANT: ICD-10-CM

## 2023-07-18 NOTE — TELEPHONE ENCOUNTER
Requested Prescriptions     Pending Prescriptions Disp Refills    HYDROcodone-acetaminophen (NORCO) 5-325 MG per tablet 33 tablet 0     Sig: Take 1 tablet by mouth 3 times daily as needed for Pain for up to 11 days.  Do not fill until 6/21/23 for 30 days Max Daily Amount: 3 tablets       Patient last seen on:  6/20/23  Date of last refill:  6/21/23  Patient complaining of:  Pt request via voicemail- out of meds on 7/21  Future appts: 8/1/23

## 2023-07-19 RX ORDER — HYDROCODONE BITARTRATE AND ACETAMINOPHEN 5; 325 MG/1; MG/1
1 TABLET ORAL 3 TIMES DAILY PRN
Qty: 33 TABLET | Refills: 0 | Status: SHIPPED | OUTPATIENT
Start: 2023-07-21 | End: 2023-08-01

## 2023-08-01 ENCOUNTER — OFFICE VISIT (OUTPATIENT)
Dept: PAIN MANAGEMENT | Age: 58
End: 2023-08-01
Payer: COMMERCIAL

## 2023-08-01 VITALS
OXYGEN SATURATION: 99 % | HEIGHT: 64 IN | WEIGHT: 147 LBS | BODY MASS INDEX: 25.1 KG/M2 | DIASTOLIC BLOOD PRESSURE: 80 MMHG | SYSTOLIC BLOOD PRESSURE: 120 MMHG | TEMPERATURE: 97.2 F | HEART RATE: 73 BPM

## 2023-08-01 DIAGNOSIS — M47.817 LUMBOSACRAL SPONDYLOSIS WITHOUT MYELOPATHY: Primary | ICD-10-CM

## 2023-08-01 DIAGNOSIS — M46.1 SACROILIAC INFLAMMATION (HCC): ICD-10-CM

## 2023-08-01 DIAGNOSIS — G89.29 ABDOMINAL PAIN, CHRONIC, RIGHT UPPER QUADRANT: ICD-10-CM

## 2023-08-01 DIAGNOSIS — G89.29 CHRONIC PAIN OF LEFT KNEE: ICD-10-CM

## 2023-08-01 DIAGNOSIS — M25.562 CHRONIC PAIN OF LEFT KNEE: ICD-10-CM

## 2023-08-01 DIAGNOSIS — R10.11 ABDOMINAL PAIN, CHRONIC, RIGHT UPPER QUADRANT: ICD-10-CM

## 2023-08-01 DIAGNOSIS — F11.90 CHRONIC, CONTINUOUS USE OF OPIOIDS: ICD-10-CM

## 2023-08-01 PROCEDURE — 3017F COLORECTAL CA SCREEN DOC REV: CPT | Performed by: NURSE PRACTITIONER

## 2023-08-01 PROCEDURE — G8419 CALC BMI OUT NRM PARAM NOF/U: HCPCS | Performed by: NURSE PRACTITIONER

## 2023-08-01 PROCEDURE — G8427 DOCREV CUR MEDS BY ELIG CLIN: HCPCS | Performed by: NURSE PRACTITIONER

## 2023-08-01 PROCEDURE — 4004F PT TOBACCO SCREEN RCVD TLK: CPT | Performed by: NURSE PRACTITIONER

## 2023-08-01 PROCEDURE — 99214 OFFICE O/P EST MOD 30 MIN: CPT | Performed by: NURSE PRACTITIONER

## 2023-08-01 RX ORDER — HYDROCODONE BITARTRATE AND ACETAMINOPHEN 5; 325 MG/1; MG/1
1 TABLET ORAL 3 TIMES DAILY PRN
Qty: 90 TABLET | Refills: 0 | Status: SHIPPED | OUTPATIENT
Start: 2023-08-01 | End: 2023-08-31

## 2023-08-01 ASSESSMENT — ENCOUNTER SYMPTOMS
ABDOMINAL PAIN: 1
COUGH: 0
BACK PAIN: 1
EYES NEGATIVE: 1
CONSTIPATION: 0
SHORTNESS OF BREATH: 0
DIARRHEA: 0
TROUBLE SWALLOWING: 0

## 2023-08-01 NOTE — PROGRESS NOTES
Jaimie RAGSDALE Monday  (1965)    8/1/2023    Subjective:     Sheron Rich Monday is 56 y.o. female who complains today of:    Chief Complaint   Patient presents with    1 Month Follow-Up         Allergies:  Augmentin [amoxicillin-pot clavulanate], Dye [iodides], Macrobid [nitrofurantoin monohydrate macrocrystals], and Flagyl [metronidazole]    Past Medical History:   Diagnosis Date    Anxiety     GERD (gastroesophageal reflux disease)     History of cellulitis and abscess     Irritable bowel syndrome      Past Surgical History:   Procedure Laterality Date    [address] [facility] [facility], 2012    HYSTERECTOMY (CERVIX STATUS UNKNOWN)  2009    OTHER SURGICAL HISTORY  02/11/15    DR Aline Cruz  EXCISION OF LT LOWER QUAD NONHEALING SKIN LESION    SMALL INTESTINE SURGERY  2012    bowel obstruction @ [address] [facility] [address] Olimpia Estes  [id number]    DR. MAGALLON     Family History   Problem Relation Age of Onset    Diabetes Mother     Heart Disease Mother         stints    Diabetes Sister     High Blood Pressure Sister      Social History     Socioeconomic History    Marital status:      Spouse name: Not on file    Number of children: Not on file    Years of education: Not on file    Highest education level: Not on file   Occupational History    Not on file   Tobacco Use    Smoking status: Some Days     Packs/day: 0.25     Years: 15.00     Pack years: 3.75     Types: Cigarettes     Start date: 1984     Passive exposure: Current    Smokeless tobacco: Never    Tobacco comments:     4/day - intermittent use   Vaping Use    Vaping Use: Never used   Substance and Sexual Activity    Alcohol use: No    Drug use: No    Sexual activity: Not on file   Other Topics Concern    Not on file   Social History Narrative    Not on file     Social Determinants of Health     Financial Resource Strain: Not on file   Food Insecurity: Not on file   Transportation Needs: Not on file   Physical Activity: Not on

## 2023-08-29 ENCOUNTER — OFFICE VISIT (OUTPATIENT)
Dept: PAIN MANAGEMENT | Age: 58
End: 2023-08-29
Payer: COMMERCIAL

## 2023-08-29 VITALS
HEART RATE: 73 BPM | BODY MASS INDEX: 25.1 KG/M2 | WEIGHT: 147 LBS | TEMPERATURE: 97.8 F | HEIGHT: 64 IN | OXYGEN SATURATION: 99 % | SYSTOLIC BLOOD PRESSURE: 110 MMHG | DIASTOLIC BLOOD PRESSURE: 72 MMHG

## 2023-08-29 DIAGNOSIS — M25.562 CHRONIC PAIN OF LEFT KNEE: ICD-10-CM

## 2023-08-29 DIAGNOSIS — G89.29 ABDOMINAL PAIN, CHRONIC, RIGHT UPPER QUADRANT: ICD-10-CM

## 2023-08-29 DIAGNOSIS — F11.90 CHRONIC, CONTINUOUS USE OF OPIOIDS: ICD-10-CM

## 2023-08-29 DIAGNOSIS — G89.29 CHRONIC RIGHT SHOULDER PAIN: ICD-10-CM

## 2023-08-29 DIAGNOSIS — M79.7 FIBROMYALGIA: Primary | ICD-10-CM

## 2023-08-29 DIAGNOSIS — R10.11 ABDOMINAL PAIN, CHRONIC, RIGHT UPPER QUADRANT: ICD-10-CM

## 2023-08-29 DIAGNOSIS — M47.817 LUMBOSACRAL SPONDYLOSIS WITHOUT MYELOPATHY: ICD-10-CM

## 2023-08-29 DIAGNOSIS — M25.511 CHRONIC RIGHT SHOULDER PAIN: ICD-10-CM

## 2023-08-29 DIAGNOSIS — G89.29 CHRONIC PAIN OF LEFT KNEE: ICD-10-CM

## 2023-08-29 DIAGNOSIS — M46.1 SACROILIAC INFLAMMATION (HCC): ICD-10-CM

## 2023-08-29 PROCEDURE — G8419 CALC BMI OUT NRM PARAM NOF/U: HCPCS | Performed by: NURSE PRACTITIONER

## 2023-08-29 PROCEDURE — 4004F PT TOBACCO SCREEN RCVD TLK: CPT | Performed by: NURSE PRACTITIONER

## 2023-08-29 PROCEDURE — 99214 OFFICE O/P EST MOD 30 MIN: CPT | Performed by: NURSE PRACTITIONER

## 2023-08-29 PROCEDURE — 3017F COLORECTAL CA SCREEN DOC REV: CPT | Performed by: NURSE PRACTITIONER

## 2023-08-29 PROCEDURE — G8427 DOCREV CUR MEDS BY ELIG CLIN: HCPCS | Performed by: NURSE PRACTITIONER

## 2023-08-29 RX ORDER — HYDROCODONE BITARTRATE AND ACETAMINOPHEN 5; 325 MG/1; MG/1
1 TABLET ORAL 3 TIMES DAILY PRN
Qty: 90 TABLET | Refills: 0 | Status: SHIPPED | OUTPATIENT
Start: 2023-08-31 | End: 2023-09-30

## 2023-08-29 ASSESSMENT — ENCOUNTER SYMPTOMS
GASTROINTESTINAL NEGATIVE: 1
SHORTNESS OF BREATH: 0
COUGH: 0
DIARRHEA: 0
EYES NEGATIVE: 1
BACK PAIN: 1
TROUBLE SWALLOWING: 0
CONSTIPATION: 0

## 2023-08-29 NOTE — PROGRESS NOTES
2021.    Will continue medications for chronic pain that has been previously directed as they do help pt function with ADL and improve quality of life. Pt is aware goal is to use the least amount of medication possible to allow pt to function and help with quality of life as discussed in detail. Ideal to keep MME below 50. Have discussed proper disposal of narcotic medication. Advised to have medications in safe place and locked up/in lock box. Discussed possible risks of opiate medication with pt, including, but not limited to possible constipation, nausea or vomiting, sedation, urinary retention, dependence and possible addiction. Pt agrees to use medication as prescribed/as directed. Pt advised to not use opiates while driving or operating heavy equipment, or in situations where pt may harm him/herself or others. Pt is aware that while on narcotics, pt needs to be seen to reassess pain and reassess need for continued medication at that time. NDP reviewed. OARRS was reviewed. Follow up:  Return in about 4 weeks (around 9/26/2023) for review meds and reassess pain.     Miguel Clark, IVÁN - CNP

## 2023-09-27 DIAGNOSIS — G89.29 ABDOMINAL PAIN, CHRONIC, RIGHT UPPER QUADRANT: ICD-10-CM

## 2023-09-27 DIAGNOSIS — F11.90 CHRONIC, CONTINUOUS USE OF OPIOIDS: ICD-10-CM

## 2023-09-27 DIAGNOSIS — M46.1 SACROILIAC INFLAMMATION (HCC): ICD-10-CM

## 2023-09-27 DIAGNOSIS — M47.817 LUMBOSACRAL SPONDYLOSIS WITHOUT MYELOPATHY: ICD-10-CM

## 2023-09-27 DIAGNOSIS — R10.11 ABDOMINAL PAIN, CHRONIC, RIGHT UPPER QUADRANT: ICD-10-CM

## 2023-09-27 DIAGNOSIS — M25.562 CHRONIC PAIN OF LEFT KNEE: ICD-10-CM

## 2023-09-27 DIAGNOSIS — G89.29 CHRONIC PAIN OF LEFT KNEE: ICD-10-CM

## 2023-09-27 NOTE — TELEPHONE ENCOUNTER
Requested Prescriptions     Pending Prescriptions Disp Refills    HYDROcodone-acetaminophen (NORCO) 5-325 MG per tablet 90 tablet 0     Sig: Take 1 tablet by mouth 3 times daily as needed for Pain for up to 30 days. Do not fill until 8/31/23 for 30 days Max Daily Amount: 3 tablets       Patient last seen on:  8/29  Date of last surgery:  n/a  Date of last refill:  8/31  Pain level:  n/a  Patient complaining of:  PT was r/sed per our office, asking for refill.   Future appts: 10/26

## 2023-09-29 RX ORDER — HYDROCODONE BITARTRATE AND ACETAMINOPHEN 5; 325 MG/1; MG/1
1 TABLET ORAL 3 TIMES DAILY PRN
Qty: 90 TABLET | Refills: 0 | Status: SHIPPED | OUTPATIENT
Start: 2023-09-30 | End: 2023-10-30

## 2023-10-26 ENCOUNTER — OFFICE VISIT (OUTPATIENT)
Dept: PAIN MANAGEMENT | Age: 58
End: 2023-10-26
Payer: COMMERCIAL

## 2023-10-26 VITALS
SYSTOLIC BLOOD PRESSURE: 136 MMHG | HEIGHT: 64 IN | DIASTOLIC BLOOD PRESSURE: 82 MMHG | BODY MASS INDEX: 25.1 KG/M2 | WEIGHT: 147 LBS | TEMPERATURE: 97.8 F

## 2023-10-26 DIAGNOSIS — M47.817 LUMBOSACRAL SPONDYLOSIS WITHOUT MYELOPATHY: ICD-10-CM

## 2023-10-26 DIAGNOSIS — F11.90 CHRONIC, CONTINUOUS USE OF OPIOIDS: ICD-10-CM

## 2023-10-26 DIAGNOSIS — G89.29 CHRONIC PAIN OF LEFT KNEE: ICD-10-CM

## 2023-10-26 DIAGNOSIS — M25.562 CHRONIC PAIN OF LEFT KNEE: ICD-10-CM

## 2023-10-26 DIAGNOSIS — G89.29 ABDOMINAL PAIN, CHRONIC, RIGHT UPPER QUADRANT: ICD-10-CM

## 2023-10-26 DIAGNOSIS — M46.1 SACROILIAC INFLAMMATION (HCC): ICD-10-CM

## 2023-10-26 DIAGNOSIS — R10.11 ABDOMINAL PAIN, CHRONIC, RIGHT UPPER QUADRANT: ICD-10-CM

## 2023-10-26 PROCEDURE — 4004F PT TOBACCO SCREEN RCVD TLK: CPT | Performed by: NURSE PRACTITIONER

## 2023-10-26 PROCEDURE — G8427 DOCREV CUR MEDS BY ELIG CLIN: HCPCS | Performed by: NURSE PRACTITIONER

## 2023-10-26 PROCEDURE — 3017F COLORECTAL CA SCREEN DOC REV: CPT | Performed by: NURSE PRACTITIONER

## 2023-10-26 PROCEDURE — 99214 OFFICE O/P EST MOD 30 MIN: CPT | Performed by: NURSE PRACTITIONER

## 2023-10-26 PROCEDURE — G8419 CALC BMI OUT NRM PARAM NOF/U: HCPCS | Performed by: NURSE PRACTITIONER

## 2023-10-26 PROCEDURE — G8484 FLU IMMUNIZE NO ADMIN: HCPCS | Performed by: NURSE PRACTITIONER

## 2023-10-26 RX ORDER — HYDROCODONE BITARTRATE AND ACETAMINOPHEN 5; 325 MG/1; MG/1
1 TABLET ORAL 3 TIMES DAILY PRN
Qty: 90 TABLET | Refills: 0 | Status: SHIPPED | OUTPATIENT
Start: 2023-10-30 | End: 2023-11-29

## 2023-10-26 ASSESSMENT — ENCOUNTER SYMPTOMS
CONSTIPATION: 0
BACK PAIN: 1
COUGH: 0
TROUBLE SWALLOWING: 0
GASTROINTESTINAL NEGATIVE: 1
EYES NEGATIVE: 1
DIARRHEA: 0
SHORTNESS OF BREATH: 0

## 2023-10-26 NOTE — PROGRESS NOTES
lumbar spine with palpation. Decreased ROM Lt hip with discomfort. Tightness in both hamstrings noted. Balance and coordination normal.  Strength is functional for ambulation. Cranial nerves II-XII are intact. Assessment:      Diagnosis Orders   1. Abdominal pain, chronic, right upper quadrant  HYDROcodone-acetaminophen (NORCO) 5-325 MG per tablet    Urine Drug Screen      2. Lumbosacral spondylosis without myelopathy  HYDROcodone-acetaminophen (NORCO) 5-325 MG per tablet    Urine Drug Screen      3. Chronic, continuous use of opioids  HYDROcodone-acetaminophen (NORCO) 5-325 MG per tablet    Urine Drug Screen      4. Chronic pain of left knee  HYDROcodone-acetaminophen (NORCO) 5-325 MG per tablet    Urine Drug Screen      5. Sacroiliac inflammation (HCC)  HYDROcodone-acetaminophen (NORCO) 5-325 MG per tablet    Urine Drug Screen          Plan:     Periodic Controlled Substance Monitoring: Possible medication side effects, risk of tolerance/dependence & alternative treatments discussed., No signs of potential drug abuse or diversion identified. , Assessed functional status (ability to engage in work or other purposeful activities, the pain intensity and its interference with activities of daily living, quality of family life and social activities, and the physical activity), Obtaining appropriate analgesic effect of treatment. (Desire Sheehan, APRN - CNP)    Orders Placed This Encounter   Medications    HYDROcodone-acetaminophen (NORCO) 5-325 MG per tablet     Sig: Take 1 tablet by mouth 3 times daily as needed for Pain for up to 30 days. Do not fill until 10/30/23 for 30 days Max Daily Amount: 3 tablets     Dispense:  90 tablet     Refill:  0     Reduce doses taken as pain becomes manageable       Orders Placed This Encounter   Procedures    Urine Drug Screen     Chronic pain/illicits     Discussed options with the patient today. Anatomic model pathology was shown and reviewed with pt.   She will get her

## 2023-11-27 ENCOUNTER — OFFICE VISIT (OUTPATIENT)
Dept: PAIN MANAGEMENT | Age: 58
End: 2023-11-27
Payer: COMMERCIAL

## 2023-11-27 VITALS
WEIGHT: 154.6 LBS | HEIGHT: 64 IN | DIASTOLIC BLOOD PRESSURE: 74 MMHG | SYSTOLIC BLOOD PRESSURE: 110 MMHG | TEMPERATURE: 97.5 F | BODY MASS INDEX: 26.4 KG/M2

## 2023-11-27 DIAGNOSIS — M46.1 SACROILIAC INFLAMMATION (HCC): ICD-10-CM

## 2023-11-27 DIAGNOSIS — M25.562 CHRONIC PAIN OF LEFT KNEE: ICD-10-CM

## 2023-11-27 DIAGNOSIS — G89.29 CHRONIC PAIN OF LEFT KNEE: ICD-10-CM

## 2023-11-27 DIAGNOSIS — R10.11 ABDOMINAL PAIN, CHRONIC, RIGHT UPPER QUADRANT: ICD-10-CM

## 2023-11-27 DIAGNOSIS — M47.817 LUMBOSACRAL SPONDYLOSIS WITHOUT MYELOPATHY: ICD-10-CM

## 2023-11-27 DIAGNOSIS — G89.29 ABDOMINAL PAIN, CHRONIC, RIGHT UPPER QUADRANT: ICD-10-CM

## 2023-11-27 DIAGNOSIS — F11.90 CHRONIC, CONTINUOUS USE OF OPIOIDS: ICD-10-CM

## 2023-11-27 PROCEDURE — 4004F PT TOBACCO SCREEN RCVD TLK: CPT | Performed by: NURSE PRACTITIONER

## 2023-11-27 PROCEDURE — 99214 OFFICE O/P EST MOD 30 MIN: CPT | Performed by: NURSE PRACTITIONER

## 2023-11-27 PROCEDURE — 3017F COLORECTAL CA SCREEN DOC REV: CPT | Performed by: NURSE PRACTITIONER

## 2023-11-27 PROCEDURE — G8484 FLU IMMUNIZE NO ADMIN: HCPCS | Performed by: NURSE PRACTITIONER

## 2023-11-27 PROCEDURE — G8419 CALC BMI OUT NRM PARAM NOF/U: HCPCS | Performed by: NURSE PRACTITIONER

## 2023-11-27 PROCEDURE — G8427 DOCREV CUR MEDS BY ELIG CLIN: HCPCS | Performed by: NURSE PRACTITIONER

## 2023-11-27 RX ORDER — HYDROCODONE BITARTRATE AND ACETAMINOPHEN 5; 325 MG/1; MG/1
1 TABLET ORAL 3 TIMES DAILY PRN
Qty: 90 TABLET | Refills: 0 | Status: SHIPPED | OUTPATIENT
Start: 2023-11-29 | End: 2023-12-29

## 2023-11-27 ASSESSMENT — ENCOUNTER SYMPTOMS
GASTROINTESTINAL NEGATIVE: 1
SHORTNESS OF BREATH: 0
TROUBLE SWALLOWING: 0
DIARRHEA: 0
EYES NEGATIVE: 1
CONSTIPATION: 0
BACK PAIN: 1
COUGH: 0

## 2023-11-27 NOTE — PROGRESS NOTES
No lymphadenopathy noted. Strong grasp bilaterally. Mildly tender with palpation LLQ. Negative SLR. Surgical scars noted. Decreased ROM with flexion and extension of low back. TTP to lumbar spine with palpation. Decreased ROM Lt hip with discomfort. Tightness in both hamstrings noted. Balance and coordination normal.  Strength is functional for ambulation. Full  ROM neck. Decreased ROM of Rt shoulder and mildly TTP over anterior Rt shoulder. Strong grasp bilaterally. Cranial nerves II-XII are intact. Assessment:      Diagnosis Orders   1. Abdominal pain, chronic, right upper quadrant  HYDROcodone-acetaminophen (NORCO) 5-325 MG per tablet      2. Lumbosacral spondylosis without myelopathy  HYDROcodone-acetaminophen (NORCO) 5-325 MG per tablet      3. Chronic, continuous use of opioids  HYDROcodone-acetaminophen (NORCO) 5-325 MG per tablet      4. Chronic pain of left knee  HYDROcodone-acetaminophen (NORCO) 5-325 MG per tablet      5. Sacroiliac inflammation (HCC)  HYDROcodone-acetaminophen (NORCO) 5-325 MG per tablet          Plan:     Periodic Controlled Substance Monitoring: Possible medication side effects, risk of tolerance/dependence & alternative treatments discussed., No signs of potential drug abuse or diversion identified. , Assessed functional status (ability to engage in work or other purposeful activities, the pain intensity and its interference with activities of daily living, quality of family life and social activities, and the physical activity), Obtaining appropriate analgesic effect of treatment. (Calin Alexis, APRN - CNP)    Orders Placed This Encounter   Medications    HYDROcodone-acetaminophen (NORCO) 5-325 MG per tablet     Sig: Take 1 tablet by mouth 3 times daily as needed for Pain for up to 30 days.  Do not fill until 11/29/23 for 30 days Max Daily Amount: 3 tablets     Dispense:  90 tablet     Refill:  0     Reduce doses taken as pain becomes manageable       No orders of

## 2023-12-28 ENCOUNTER — OFFICE VISIT (OUTPATIENT)
Dept: PAIN MANAGEMENT | Age: 58
End: 2023-12-28
Payer: COMMERCIAL

## 2023-12-28 VITALS
SYSTOLIC BLOOD PRESSURE: 118 MMHG | WEIGHT: 147 LBS | TEMPERATURE: 97.8 F | DIASTOLIC BLOOD PRESSURE: 84 MMHG | HEIGHT: 64 IN | BODY MASS INDEX: 25.1 KG/M2

## 2023-12-28 DIAGNOSIS — G89.29 CHRONIC PAIN OF LEFT KNEE: ICD-10-CM

## 2023-12-28 DIAGNOSIS — M46.1 SACROILIAC INFLAMMATION (HCC): ICD-10-CM

## 2023-12-28 DIAGNOSIS — R10.11 ABDOMINAL PAIN, CHRONIC, RIGHT UPPER QUADRANT: ICD-10-CM

## 2023-12-28 DIAGNOSIS — G89.29 ABDOMINAL PAIN, CHRONIC, RIGHT UPPER QUADRANT: ICD-10-CM

## 2023-12-28 DIAGNOSIS — M47.817 LUMBOSACRAL SPONDYLOSIS WITHOUT MYELOPATHY: ICD-10-CM

## 2023-12-28 DIAGNOSIS — F11.90 CHRONIC, CONTINUOUS USE OF OPIOIDS: ICD-10-CM

## 2023-12-28 DIAGNOSIS — M25.562 CHRONIC PAIN OF LEFT KNEE: ICD-10-CM

## 2023-12-28 PROCEDURE — 4004F PT TOBACCO SCREEN RCVD TLK: CPT | Performed by: NURSE PRACTITIONER

## 2023-12-28 PROCEDURE — G8427 DOCREV CUR MEDS BY ELIG CLIN: HCPCS | Performed by: NURSE PRACTITIONER

## 2023-12-28 PROCEDURE — 3017F COLORECTAL CA SCREEN DOC REV: CPT | Performed by: NURSE PRACTITIONER

## 2023-12-28 PROCEDURE — G8419 CALC BMI OUT NRM PARAM NOF/U: HCPCS | Performed by: NURSE PRACTITIONER

## 2023-12-28 PROCEDURE — G8484 FLU IMMUNIZE NO ADMIN: HCPCS | Performed by: NURSE PRACTITIONER

## 2023-12-28 PROCEDURE — 99213 OFFICE O/P EST LOW 20 MIN: CPT | Performed by: NURSE PRACTITIONER

## 2023-12-28 RX ORDER — HYDROCODONE BITARTRATE AND ACETAMINOPHEN 5; 325 MG/1; MG/1
1 TABLET ORAL 3 TIMES DAILY PRN
Qty: 90 TABLET | Refills: 0 | Status: SHIPPED | OUTPATIENT
Start: 2023-12-29 | End: 2024-01-28

## 2023-12-28 NOTE — PROGRESS NOTES
Patient: Alanna Ambriz Monday  YOB: 1965  Date: 12/28/23        Subjective:     Alanna Ambriz Monday is a 66 y.o. female who complains today of:    Chief Complaint   Patient presents with    Pain         Allergies:  Dye [iodides], Macrobid [nitrofurantoin monohydrate macrocrystals], and Flagyl [metronidazole]    Past Medical History:   Diagnosis Date    Anxiety     GERD (gastroesophageal reflux disease)     History of cellulitis and abscess     Irritable bowel syndrome      Past Surgical History:   Procedure Laterality Date    [address] [facility] [facility], 2012    HYSTERECTOMY (CERVIX STATUS UNKNOWN)  2009    OTHER SURGICAL HISTORY  02/11/15    DR Monique Rincon  EXCISION OF LT LOWER QUAD NONHEALING SKIN LESION    SMALL INTESTINE SURGERY  2012    bowel obstruction @ [address] [facility] [address] Hannah Fuller  [id number]    DR. MAGALLON     Family History   Problem Relation Age of Onset    Diabetes Mother     Heart Disease Mother         stints    Diabetes Sister     High Blood Pressure Sister      Social History     Socioeconomic History    Marital status:      Spouse name: Not on file    Number of children: Not on file    Years of education: Not on file    Highest education level: Not on file   Occupational History    Not on file   Tobacco Use    Smoking status: Some Days     Current packs/day: 0.25     Average packs/day: 0.3 packs/day for 40.0 years (10.0 ttl pk-yrs)     Types: Cigarettes     Start date: 1984     Passive exposure: Current    Smokeless tobacco: Never    Tobacco comments:     4/day - intermittent use   Vaping Use    Vaping Use: Never used   Substance and Sexual Activity    Alcohol use: No    Drug use: No    Sexual activity: Not on file   Other Topics Concern    Not on file   Social History Narrative    Not on file     Social Determinants of Health     Financial Resource Strain: Not on file   Food Insecurity: Not on file   Transportation Needs: Not on file   Physical

## 2024-01-20 DIAGNOSIS — K21.9 GASTROESOPHAGEAL REFLUX DISEASE, UNSPECIFIED WHETHER ESOPHAGITIS PRESENT: ICD-10-CM

## 2024-01-22 RX ORDER — PANTOPRAZOLE SODIUM 40 MG/1
TABLET, DELAYED RELEASE ORAL
Qty: 30 TABLET | Refills: 0 | Status: SHIPPED | OUTPATIENT
Start: 2024-01-22

## 2024-01-25 ENCOUNTER — OFFICE VISIT (OUTPATIENT)
Dept: PAIN MANAGEMENT | Age: 59
End: 2024-01-25
Payer: COMMERCIAL

## 2024-01-25 VITALS
BODY MASS INDEX: 25.1 KG/M2 | TEMPERATURE: 97 F | HEIGHT: 64 IN | SYSTOLIC BLOOD PRESSURE: 122 MMHG | DIASTOLIC BLOOD PRESSURE: 84 MMHG | WEIGHT: 147 LBS

## 2024-01-25 DIAGNOSIS — R10.11 ABDOMINAL PAIN, CHRONIC, RIGHT UPPER QUADRANT: ICD-10-CM

## 2024-01-25 DIAGNOSIS — M46.1 SACROILIAC INFLAMMATION (HCC): ICD-10-CM

## 2024-01-25 DIAGNOSIS — F11.90 CHRONIC, CONTINUOUS USE OF OPIOIDS: ICD-10-CM

## 2024-01-25 DIAGNOSIS — M47.817 LUMBOSACRAL SPONDYLOSIS WITHOUT MYELOPATHY: ICD-10-CM

## 2024-01-25 DIAGNOSIS — M25.521 RIGHT ELBOW PAIN: ICD-10-CM

## 2024-01-25 DIAGNOSIS — G89.29 CHRONIC PAIN OF LEFT KNEE: ICD-10-CM

## 2024-01-25 DIAGNOSIS — G89.29 ABDOMINAL PAIN, CHRONIC, RIGHT UPPER QUADRANT: ICD-10-CM

## 2024-01-25 DIAGNOSIS — M25.511 ACUTE PAIN OF RIGHT SHOULDER: ICD-10-CM

## 2024-01-25 DIAGNOSIS — M25.511 ACUTE PAIN OF RIGHT SHOULDER: Primary | ICD-10-CM

## 2024-01-25 DIAGNOSIS — M25.562 CHRONIC PAIN OF LEFT KNEE: ICD-10-CM

## 2024-01-25 PROCEDURE — G8484 FLU IMMUNIZE NO ADMIN: HCPCS | Performed by: NURSE PRACTITIONER

## 2024-01-25 PROCEDURE — 73030 X-RAY EXAM OF SHOULDER: CPT | Performed by: NURSE PRACTITIONER

## 2024-01-25 PROCEDURE — 99214 OFFICE O/P EST MOD 30 MIN: CPT | Performed by: NURSE PRACTITIONER

## 2024-01-25 PROCEDURE — G8419 CALC BMI OUT NRM PARAM NOF/U: HCPCS | Performed by: NURSE PRACTITIONER

## 2024-01-25 PROCEDURE — 4004F PT TOBACCO SCREEN RCVD TLK: CPT | Performed by: NURSE PRACTITIONER

## 2024-01-25 PROCEDURE — G8427 DOCREV CUR MEDS BY ELIG CLIN: HCPCS | Performed by: NURSE PRACTITIONER

## 2024-01-25 PROCEDURE — 3017F COLORECTAL CA SCREEN DOC REV: CPT | Performed by: NURSE PRACTITIONER

## 2024-01-25 RX ORDER — HYDROCODONE BITARTRATE AND ACETAMINOPHEN 5; 325 MG/1; MG/1
1 TABLET ORAL 3 TIMES DAILY PRN
Qty: 90 TABLET | Refills: 0 | Status: SHIPPED | OUTPATIENT
Start: 2024-01-28 | End: 2024-02-27

## 2024-01-25 ASSESSMENT — ENCOUNTER SYMPTOMS
GASTROINTESTINAL NEGATIVE: 1
COUGH: 0
CONSTIPATION: 0
TROUBLE SWALLOWING: 0
SHORTNESS OF BREATH: 0
BACK PAIN: 1
DIARRHEA: 0
EYES NEGATIVE: 1

## 2024-01-25 NOTE — PROGRESS NOTES
Jaimie RAGSDALE Monday  (1965)    1/25/2024    Subjective:     Jaimie RAGSDALE Monday is 58 y.o. female who complains today of:    Chief Complaint   Patient presents with    Pain    Back Pain         Allergies:  Dye [iodides], Macrobid [nitrofurantoin monohydrate macrocrystals], and Flagyl [metronidazole]    Past Medical History:   Diagnosis Date    Anxiety     GERD (gastroesophageal reflux disease)     History of cellulitis and abscess     Irritable bowel syndrome      Past Surgical History:   Procedure Laterality Date    HERNIA REPAIR  1992, 2012    HYSTERECTOMY (CERVIX STATUS UNKNOWN)  2009    OTHER SURGICAL HISTORY  02/11/15    DR AMAYA  EXCISION OF LT LOWER QUAD NONHEALING SKIN LESION    SMALL INTESTINE SURGERY  2012    bowel obstruction @ [facility] Dr. Amaya     TUBAL LIGATION  1991    UPPER GASTROINTESTINAL ENDOSCOPY  [id number]    DR. MAGALLON     Family History   Problem Relation Age of Onset    Diabetes Mother     Heart Disease Mother         stints    Diabetes Sister     High Blood Pressure Sister      Social History     Socioeconomic History    Marital status:      Spouse name: Not on file    Number of children: Not on file    Years of education: Not on file    Highest education level: Not on file   Occupational History    Not on file   Tobacco Use    Smoking status: Some Days     Current packs/day: 0.25     Average packs/day: 0.2 packs/day for 40.1 years (10.0 ttl pk-yrs)     Types: Cigarettes     Start date: 1984     Passive exposure: Current    Smokeless tobacco: Never    Tobacco comments:     4/day - intermittent use   Vaping Use    Vaping Use: Never used   Substance and Sexual Activity    Alcohol use: No    Drug use: No    Sexual activity: Not on file   Other Topics Concern    Not on file   Social History Narrative    Not on file     Social Determinants of Health     Financial Resource Strain: Not on file   Food Insecurity: Not on file   Transportation Needs: Not on file   Physical Activity: Not on

## 2024-01-29 ENCOUNTER — TELEPHONE (OUTPATIENT)
Dept: PAIN MANAGEMENT | Age: 59
End: 2024-01-29

## 2024-01-29 NOTE — TELEPHONE ENCOUNTER
----- Message from IVÁN Cueto CNP sent at 1/29/2024  7:58 AM EST -----  X-ray of right shoulder dated 1/25/2024 report reviewed showing no acute abnormality, and a few nonspecific calcifications along humeral head questionable tendinitis.  Please advise patient and encourage her to start physical therapy of the shoulder and elbow as previously directed.

## 2024-02-13 ENCOUNTER — HOSPITAL ENCOUNTER (OUTPATIENT)
Dept: PHYSICAL THERAPY | Age: 59
Setting detail: THERAPIES SERIES
Discharge: HOME | End: 2024-02-13
Payer: COMMERCIAL

## 2024-02-13 PROCEDURE — G0283 ELEC STIM OTHER THAN WOUND: HCPCS

## 2024-02-13 PROCEDURE — 97162 PT EVAL MOD COMPLEX 30 MIN: CPT

## 2024-02-13 NOTE — PROGRESS NOTES
[address]   [address]   [place] [address]  Phone: [phone]                             Physical Therapy: Initial Evaluation    Patient: Jaimie RAGSDALE Monday (58 y.o.     female)   Examination Date: 2024   :  1965 ;    Confirmed: Yes MRN: [id number]  CSN: [id number]   Insurance: Payor: [employer] / Plan: [employer] [address] / Product Type: *No Product type* /   Insurance ID: [id number] - (Commercial) PT Insurance Information: [employer] [place] Secondary Insurance (if applicable):    Referring Physician: Alina Davis, APRN - CNP      PCP: Hector Wright DO Visits to Date/Visits Approved:     No Show/Cancelled Appts: 0  0     Medical Diagnosis: Right elbow pain [M25.521]  Acute pain of right shoulder [M25.511]    Treatment Diagnosis: L shoulder/elbow pain with decrease ROM/strength affecting overall functional abilty using L arm for ADL's     PERTINENT MEDICAL HISTORY   Patient Assessed for Rehabilitation Services: Yes       Medical History: Chart Reviewed: Yes   Past Medical History:   Diagnosis Date    Anxiety     GERD (gastroesophageal reflux disease)     History of cellulitis and abscess     Irritable bowel syndrome      Surgical History:   Past Surgical History:   Procedure Laterality Date    HERNIA REPAIR  ,     HYSTERECTOMY (CERVIX STATUS UNKNOWN)      OTHER SURGICAL HISTORY  02/11/15    DR SANCHEZ  EXCISION OF LT LOWER QUAD NONHEALING SKIN LESION    SMALL INTESTINE SURGERY      bowel obstruction @ [facility] Dr. Sanchez     TUBAL LIGATION      UPPER GASTROINTESTINAL ENDOSCOPY  [id number]    DR. MAGALLON       Medications:   Current Outpatient Medications:     HYDROcodone-acetaminophen (NORCO) 5-325 MG per tablet, Take 1 tablet by mouth 3 times daily as needed for Pain for up to 30 days. Do not fill until 24 for 30 days Max Daily Amount: 3 tablets, Disp: 90 tablet, Rfl: 0    Tens Unit MISC, by Does not apply route Use as

## 2024-02-13 NOTE — PLAN OF CARE
PHYSICAL THERAPY PLAN OF CARE    [address]   [place] [address]      Phone:[phone]    [] Certification  [] Recertification [x]  Plan of Care  [] Progress Note [] Discharge      Referring Provider: Alina Davis, IVÁN - CNP     From:  Ivette Lopez, PT    Patient: Jaimie RAGSDALE Monday (58 y.o. female) : 1965 Date: 2024   Medical Diagnosis: Right elbow pain [M25.521]  Acute pain of right shoulder [M25.511]      Treatment Diagnosis: L shoulder/elbow pain with decrease ROM/strength affecting overall functional abilty using L arm for ADL's      Progress Report Period from:  2024  to 2024    Visits to Date: 1 No Show: 0 Cancelled Appts: 0    OBJECTIVE:   Short Term Goals - Time Frame for Short Term Goals: 2 weeks    Goals Current/Discharge status  Status   Short Term Goal 1: Decrease R shoulder/elbow pain 50% to assist with improved functional gains.  Pain Screening  Patient Currently in Pain: Yes  Pain Assessment: 0-10  Pain Level: 7  Best Pain Level: 2  Worst Pain Level: 10  Pain Location: Shoulder, Elbow  Pain Orientation: Right  Pain Descriptors: Throbbing (denies NT in R UE, has NT in L UE since surgeries per pt) STG Goal 1 Status:: New   Short term goal 2: Pain-free R shoulder    PROM RUE (degrees)  flex 180 and Abd 160 PROM to WNL        R Shoulder Flex  (0-180): 150  allowing an increase in ADL tolerance.      R Shoulder ABduction (0-180): 90      Long Term Goals - Time Frame for Long Term Goals : 4-6 weeks  Goals Current/ Discharge status Status   Long Term Goal 1: Indep HEP for symptom management Needs Written HEP initiated  for symptom management  Needs progression for comprehensive program development. LTG Goal 1 Status:: New   Long Term Goal 2: Pt demo improved overall function by reporting greater than 80% per functional survey score Exam: UEFI 40/80=50% functional   LTG Goal 2 Status:: New   Long Term Goal 3: Pain-free R shoulder AROM to equal L

## 2024-02-21 ENCOUNTER — OFFICE VISIT (OUTPATIENT)
Dept: PAIN MANAGEMENT | Age: 59
End: 2024-02-21
Payer: COMMERCIAL

## 2024-02-21 ENCOUNTER — HOSPITAL ENCOUNTER (OUTPATIENT)
Dept: PHYSICAL THERAPY | Age: 59
Setting detail: THERAPIES SERIES
Discharge: HOME | End: 2024-02-21
Payer: COMMERCIAL

## 2024-02-21 VITALS
BODY MASS INDEX: 25.1 KG/M2 | DIASTOLIC BLOOD PRESSURE: 74 MMHG | HEIGHT: 64 IN | TEMPERATURE: 97 F | WEIGHT: 147 LBS | SYSTOLIC BLOOD PRESSURE: 122 MMHG

## 2024-02-21 DIAGNOSIS — M25.521 RIGHT ELBOW PAIN: ICD-10-CM

## 2024-02-21 DIAGNOSIS — R10.11 ABDOMINAL PAIN, CHRONIC, RIGHT UPPER QUADRANT: ICD-10-CM

## 2024-02-21 DIAGNOSIS — M47.817 LUMBOSACRAL SPONDYLOSIS WITHOUT MYELOPATHY: ICD-10-CM

## 2024-02-21 DIAGNOSIS — G89.29 CHRONIC PAIN OF LEFT KNEE: ICD-10-CM

## 2024-02-21 DIAGNOSIS — M25.562 CHRONIC PAIN OF LEFT KNEE: ICD-10-CM

## 2024-02-21 DIAGNOSIS — M46.1 SACROILIAC INFLAMMATION (HCC): ICD-10-CM

## 2024-02-21 DIAGNOSIS — G89.29 ABDOMINAL PAIN, CHRONIC, RIGHT UPPER QUADRANT: ICD-10-CM

## 2024-02-21 DIAGNOSIS — F11.90 CHRONIC, CONTINUOUS USE OF OPIOIDS: ICD-10-CM

## 2024-02-21 DIAGNOSIS — M25.511 ACUTE PAIN OF RIGHT SHOULDER: ICD-10-CM

## 2024-02-21 PROCEDURE — 4004F PT TOBACCO SCREEN RCVD TLK: CPT | Performed by: NURSE PRACTITIONER

## 2024-02-21 PROCEDURE — G8427 DOCREV CUR MEDS BY ELIG CLIN: HCPCS | Performed by: NURSE PRACTITIONER

## 2024-02-21 PROCEDURE — G0283 ELEC STIM OTHER THAN WOUND: HCPCS

## 2024-02-21 PROCEDURE — 99214 OFFICE O/P EST MOD 30 MIN: CPT | Performed by: NURSE PRACTITIONER

## 2024-02-21 PROCEDURE — G8419 CALC BMI OUT NRM PARAM NOF/U: HCPCS | Performed by: NURSE PRACTITIONER

## 2024-02-21 PROCEDURE — 3017F COLORECTAL CA SCREEN DOC REV: CPT | Performed by: NURSE PRACTITIONER

## 2024-02-21 PROCEDURE — 97110 THERAPEUTIC EXERCISES: CPT

## 2024-02-21 PROCEDURE — G8484 FLU IMMUNIZE NO ADMIN: HCPCS | Performed by: NURSE PRACTITIONER

## 2024-02-21 RX ORDER — HYDROCODONE BITARTRATE AND ACETAMINOPHEN 5; 325 MG/1; MG/1
1 TABLET ORAL 3 TIMES DAILY PRN
Qty: 90 TABLET | Refills: 0 | Status: SHIPPED | OUTPATIENT
Start: 2024-02-27 | End: 2024-03-28

## 2024-02-21 ASSESSMENT — PAIN DESCRIPTION - ORIENTATION: ORIENTATION: RIGHT

## 2024-02-21 ASSESSMENT — PAIN DESCRIPTION - DESCRIPTORS: DESCRIPTORS: THROBBING

## 2024-02-21 ASSESSMENT — ENCOUNTER SYMPTOMS
DIARRHEA: 0
TROUBLE SWALLOWING: 0
GASTROINTESTINAL NEGATIVE: 1
BACK PAIN: 1
EYES NEGATIVE: 1
SHORTNESS OF BREATH: 0
COUGH: 0
CONSTIPATION: 0

## 2024-02-21 ASSESSMENT — PAIN DESCRIPTION - LOCATION: LOCATION: SHOULDER

## 2024-02-21 ASSESSMENT — PAIN SCALES - GENERAL: PAINLEVEL_OUTOF10: 7

## 2024-02-21 NOTE — PROGRESS NOTES
[address]   [place] [id number]  Phone:[phone]      Physical TherapyTreatment Note        Date: 2024  Patient: Jaimie RAGSDALE Monday  : 1965   Confirmed: Yes  MRN: [id number]  Referring Provider: Alina Davis APRN - CNP      Medical Diagnosis: Right elbow pain [M25.521]  Acute pain of right shoulder [M25.511]      Treatment Diagnosis: R shoulder/elbow pain with decrease ROM/strength affecting overall functional abilty using L arm for ADL's    Visit Information:  Insurance: Payor: MED[employer] / Plan: MED[employer] M[employer]UAL [address] / Product Type: *No Product type* /   PT Visit Information  Onset Date: 23 (after a fall)  PT Insurance Information: Medical [place]  Total # of Visits Approved: 60  Total # of Visits to Date: 2  No Show: 0  Canceled Appointment: 0  Progress Note Counter:     Subjective Information:  Subjective: Pt reports that certain movements hurt it more then others.  HEP Compliance:  [x] Good [] Fair [] Poor [] Reports not doing due to:    Pain Screening  Patient Currently in Pain: Yes  Pain Assessment: 0-10  Pain Level: 7  Pain Location: Shoulder  Pain Orientation: Right  Pain Descriptors: Throbbing    Treatment:  Exercises:  Exercises  Exercise 1: pulleys 2 min flexion 1 min abduction  Exercise 2: Table slides flexion [id number] abduction [id number]  Exercise 4: R UT stretch 2x30 sec  Exercise 6: shrugs, rolls and retraction x10  Treatment Reasoning  Limitations addressed: Strength, Pain modulation, Posture, Flexibility  Functional ability(s) targeted: Tolerance to age appropriate activities, Reaching overhead    Manual:   Manual Therapy  Other: pt declined manual this date.       Modalities:  Moist Heat (CPT 34476)  Patient Position: Seated  Number Minutes Moist Heat: 10 min  Moist heat location: Right, Shoulder  Post treatment skin assessment: Intact  Limitations addressed: Pain modulation, Tissue extensibility  Functional ability(s) targeted:

## 2024-02-21 NOTE — PROGRESS NOTES
Jaimie RAGSDALE Monday  (1965)    2/21/2024    Subjective:     Jaimie RAGSDALE Monday is 58 y.o. female who complains today of:    Chief Complaint   Patient presents with    Follow-up    Shoulder Pain         Allergies:  Dye [iodides], Macrobid [nitrofurantoin monohydrate macrocrystals], and Flagyl [metronidazole]    Past Medical History:   Diagnosis Date    Anxiety     GERD (gastroesophageal reflux disease)     History of cellulitis and abscess     Irritable bowel syndrome      Past Surgical History:   Procedure Laterality Date    HERNIA REPAIR  1992, 2012    HYSTERECTOMY (CERVIX STATUS UNKNOWN)  2009    OTHER SURGICAL HISTORY  02/11/15    DR AMAYA  EXCISION OF LT LOWER QUAD NONHEALING SKIN LESION    SMALL INTESTINE SURGERY  2012    bowel obstruction @ [facility] Dr. Amaya     TUBAL LIGATION  1991    UPPER GASTROINTESTINAL ENDOSCOPY  [id number]    DR. MAGALLON     Family History   Problem Relation Age of Onset    Diabetes Mother     Heart Disease Mother         stints    Diabetes Sister     High Blood Pressure Sister      Social History     Socioeconomic History    Marital status:      Spouse name: Not on file    Number of children: Not on file    Years of education: Not on file    Highest education level: Not on file   Occupational History    Not on file   Tobacco Use    Smoking status: Some Days     Current packs/day: 0.25     Average packs/day: 0.3 packs/day for 40.1 years (10.0 ttl pk-yrs)     Types: Cigarettes     Start date: 1984     Passive exposure: Current    Smokeless tobacco: Never    Tobacco comments:     4/day - intermittent use   Vaping Use    Vaping Use: Never used   Substance and Sexual Activity    Alcohol use: No    Drug use: No    Sexual activity: Not on file   Other Topics Concern    Not on file   Social History Narrative    Not on file     Social Determinants of Health     Financial Resource Strain: Not on file   Food Insecurity: Not on file   Transportation Needs: Not on file   Physical Activity:

## 2024-03-01 ENCOUNTER — HOSPITAL ENCOUNTER (OUTPATIENT)
Dept: PHYSICAL THERAPY | Age: 59
Setting detail: THERAPIES SERIES
Discharge: HOME | End: 2024-03-01
Payer: COMMERCIAL

## 2024-03-01 NOTE — PROGRESS NOTES
Therapy                            Cancellation/No-show Note    Date: 2024  Patient: Jaimie RAGSDALE Monday (58 y.o. female)  : 1965  MRN:  [id number]  Referring Physician: Alina Davis APRN - CNP    Medical Diagnosis: Right elbow pain [M25.521]  Acute pain of right shoulder [M25.511]      Visit Information:  Insurance: Payor: MED[employer] M[employer]UAL / Plan: MEDICAL M[employer]UAL [address] / Product Type: *No Product type* /   Visits to Date: 2   No Show/Cancelled Appts: 0 / 1      For today's appointment patient:  [x]  Cancelled  []  Rescheduled appointment  []  No-show   []  Called pt to remind of next appointment     Reason given by patient:  [x]  Patient ill  []  Conflicting appointment  []  No transportation    []  Conflict with work  []  No reason given  []  Other:      [x] Pt has future appointments scheduled, no follow up needed  [] Pt requests to be on hold.    Reason:   If > 2 weeks please discuss with therapist.  [] Therapist to call pt for follow up     Comments:       Signature: Electronically signed by Kimber Kennedy PTA on 3/1/24 at 11:24 AM EST

## 2024-03-05 ENCOUNTER — HOSPITAL ENCOUNTER (OUTPATIENT)
Dept: PHYSICAL THERAPY | Age: 59
Setting detail: THERAPIES SERIES
Discharge: HOME | End: 2024-03-05
Payer: COMMERCIAL

## 2024-03-05 PROCEDURE — 97110 THERAPEUTIC EXERCISES: CPT

## 2024-03-05 ASSESSMENT — PAIN DESCRIPTION - DESCRIPTORS: DESCRIPTORS: ACHING

## 2024-03-05 ASSESSMENT — PAIN SCALES - GENERAL: PAINLEVEL_OUTOF10: 2

## 2024-03-05 ASSESSMENT — PAIN DESCRIPTION - ORIENTATION: ORIENTATION: RIGHT

## 2024-03-05 ASSESSMENT — PAIN DESCRIPTION - LOCATION: LOCATION: SHOULDER

## 2024-03-05 NOTE — PROGRESS NOTES
[address]   [place] [id number]  Phone:[phone]      Physical TherapyTreatment Note        Date: 3/5/2024  Patient: Jaimie RAGSDALE Monday  : 1965   Confirmed: Yes  MRN: [id number]  Referring Provider: Alina Davis, APRN - CNP      Medical Diagnosis: Right elbow pain [M25.521]  Acute pain of right shoulder [M25.511]      Treatment Diagnosis: R shoulder/elbow pain with decrease ROM/strength affecting overall functional abilty using L arm for ADL's    Visit Information:  Insurance: Payor: MEDICAL M[employer]UAL / Plan: MEDICAL MUTUAL [address] / Product Type: *No Product type* /   PT Visit Information  Onset Date: 23 (after a fall)  PT Insurance Information: [facility] [place]  Total # of Visits Approved: 60  Total # of Visits to Date: 3  No Show: 0  Canceled Appointment: 0  Progress Note Counter: 3/12    Subjective Information:  Subjective: Pt came to appt 19 min late able to see for shortened appt. Pt reports going to get xray on R elbow after therapy.  HEP Compliance:  [x] Good [] Fair [] Poor [] Reports not doing due to:    Pain Screening  Patient Currently in Pain: Yes  Pain Assessment: 0-10  Pain Level: 2  Pain Location: Shoulder  Pain Orientation: Right  Pain Descriptors: Aching    Treatment:  Exercises:  Exercises  Exercise 2: Table slides flexion 8ivtg11 abduction 3cqzw92  Exercise 3: shoulder isometrics with ball 6devl22  Exercise 4: R UT stretch 2x30 sec  Exercise 6: shrugs, rolls and retraction x10    *Indicates exercise, modality, or manual techniques to be initiated when appropriate    Objective Measures:   LTG 3 Current Status:: R shoulder flexion  120 abduction 113        Assessment:   Body Structures, Functions, Activity Limitations Requiring Skilled Therapeutic Intervention: Decreased posture, Increased pain, Decreased ADL status, Decreased ROM, Decreased strength  Assessment: Pt with good milan to exercises. Initialed shoulder isometrics to increase strength. Pt

## 2024-03-07 ENCOUNTER — TELEPHONE (OUTPATIENT)
Dept: PAIN MANAGEMENT | Age: 59
End: 2024-03-07

## 2024-03-07 DIAGNOSIS — M25.521 RIGHT ELBOW PAIN: ICD-10-CM

## 2024-03-07 DIAGNOSIS — M25.511 ACUTE PAIN OF RIGHT SHOULDER: ICD-10-CM

## 2024-03-07 PROCEDURE — 73070 X-RAY EXAM OF ELBOW: CPT | Performed by: NURSE PRACTITIONER

## 2024-03-07 NOTE — TELEPHONE ENCOUNTER
----- Message from IVÁN Cueto CNP sent at 3/7/2024 12:27 PM EST -----  X-ray report of right elbow reviewed from 3/5/2024 showing no fracture or dislocation or bony abnormality.  Please tell patient x-ray result is normal

## 2024-03-12 ENCOUNTER — HOSPITAL ENCOUNTER (OUTPATIENT)
Dept: PHYSICAL THERAPY | Age: 59
Setting detail: THERAPIES SERIES
Discharge: HOME | End: 2024-03-12
Payer: COMMERCIAL

## 2024-03-12 NOTE — PROGRESS NOTES
Therapy                            Cancellation/No-show Note    Date: 2024  Patient: Jaimie RAGSDALE Monday (58 y.o. female)  : 1965  MRN:  [id number]  Referring Physician: Alina Davis APRN - CNP    Medical Diagnosis: Right elbow pain [M25.521]  Acute pain of right shoulder [M25.511]      Visit Information:  Insurance: Payor: MEDICAL M[employer]UAL / Plan: MEDICAL MUTUAL [address] / Product Type: *No Product type* /   Visits to Date: 3   No Show/Cancelled Appts: 0 / 2      For today's appointment patient:  [x]  Cancelled  []  Rescheduled appointment  []  No-show   []  Called pt to remind of next appointment     Reason given by patient:  [x]  Patient ill  []  Conflicting appointment  []  No transportation    []  Conflict with work  []  No reason given  []  Other:      [x] Pt has future appointments scheduled, no follow up needed  [] Pt requests to be on hold.    Reason:   If > 2 weeks please discuss with therapist.  [] Therapist to call pt for follow up     Comments:       Signature: Electronically signed by Jeanne Garcia PTA on 3/12/24 at 9:00 AM EDT

## 2024-03-19 ENCOUNTER — HOSPITAL ENCOUNTER (OUTPATIENT)
Dept: PHYSICAL THERAPY | Age: 59
Setting detail: THERAPIES SERIES
Discharge: HOME | End: 2024-03-19
Payer: COMMERCIAL

## 2024-03-19 PROCEDURE — 97110 THERAPEUTIC EXERCISES: CPT

## 2024-03-19 ASSESSMENT — PAIN DESCRIPTION - LOCATION: LOCATION: SHOULDER

## 2024-03-19 ASSESSMENT — PAIN SCALES - GENERAL: PAINLEVEL_OUTOF10: 2

## 2024-03-19 ASSESSMENT — PAIN DESCRIPTION - ORIENTATION: ORIENTATION: RIGHT

## 2024-03-19 ASSESSMENT — PAIN DESCRIPTION - DESCRIPTORS: DESCRIPTORS: DULL;ACHING

## 2024-03-19 NOTE — PROGRESS NOTES
[address]   [place] [id number]  Phone:[phone]      Physical TherapyTreatment Note        Date: 3/19/2024  Patient: Jaimie RAGSDALE Monday  : 1965   Confirmed: Yes  MRN: [id number]  Referring Provider: Alina Davis, IVÁN - CNP      Medical Diagnosis: Right elbow pain [M25.521]  Acute pain of right shoulder [M25.511]      Treatment Diagnosis: R shoulder/elbow pain with decrease ROM/strength affecting overall functional abilty using L arm for ADL's    Visit Information:  Insurance: Payor: MEDICAL MUTUAL / Plan: MEDICAL MUTUAL [address] / Product Type: *No Product type* /   PT Visit Information  Onset Date: 23 (after a fall)  PT Insurance Information: Medical [place]  Total # of Visits Approved: 60  Total # of Visits to Date: 4  No Show: 0  Canceled Appointment: 0  Progress Note Counter:     Subjective Information:  Subjective: Pt reports has xray on elbow everything looks ok.  HEP Compliance:  [x] Good [] Fair [] Poor [] Reports not doing due to:    Pain Screening  Patient Currently in Pain: Yes  Pain Assessment: 0-10  Pain Level: 2  Pain Location: Shoulder  Pain Orientation: Right  Pain Descriptors: Dull, Aching    Treatment:  Exercises:  Exercises  Exercise 2: Table slides flexion 5yjut71 abduction 9rzuy77  Exercise 3: shoulder isometrics with ball 9hcos36 6 way  Exercise 4: R UT stretch 2x30 sec  Exercise 5: RTB rows and lats IR/ER x10  Exercise 6: shrugs, rolls and retraction x10  Exercise 7: shoulder flexion and abduction 2lpyu00 with cane seated  Exercise 8: ER seated 2zqxz82  Treatment Reasoning  Limitations addressed: Strength, Pain modulation, Posture, Flexibility  Functional ability(s) targeted: Tolerance to age appropriate activities, Reaching overhead    Assessment:   Body Structures, Functions, Activity Limitations Requiring Skilled Therapeutic Intervention: Decreased posture, Increased pain, Decreased ADL status, Decreased ROM, Decreased

## 2024-03-27 ENCOUNTER — OFFICE VISIT (OUTPATIENT)
Dept: PAIN MANAGEMENT | Age: 59
End: 2024-03-27
Payer: COMMERCIAL

## 2024-03-27 VITALS
HEIGHT: 64 IN | WEIGHT: 147 LBS | BODY MASS INDEX: 25.1 KG/M2 | DIASTOLIC BLOOD PRESSURE: 80 MMHG | SYSTOLIC BLOOD PRESSURE: 130 MMHG | TEMPERATURE: 97 F

## 2024-03-27 DIAGNOSIS — M25.521 RIGHT ELBOW PAIN: ICD-10-CM

## 2024-03-27 DIAGNOSIS — M47.817 LUMBOSACRAL SPONDYLOSIS WITHOUT MYELOPATHY: ICD-10-CM

## 2024-03-27 DIAGNOSIS — M25.562 CHRONIC PAIN OF LEFT KNEE: ICD-10-CM

## 2024-03-27 DIAGNOSIS — F11.90 CHRONIC, CONTINUOUS USE OF OPIOIDS: ICD-10-CM

## 2024-03-27 DIAGNOSIS — G89.29 ABDOMINAL PAIN, CHRONIC, RIGHT UPPER QUADRANT: ICD-10-CM

## 2024-03-27 DIAGNOSIS — M25.511 ACUTE PAIN OF RIGHT SHOULDER: ICD-10-CM

## 2024-03-27 DIAGNOSIS — G89.29 CHRONIC PAIN OF LEFT KNEE: ICD-10-CM

## 2024-03-27 DIAGNOSIS — R10.11 ABDOMINAL PAIN, CHRONIC, RIGHT UPPER QUADRANT: ICD-10-CM

## 2024-03-27 DIAGNOSIS — M46.1 SACROILIAC INFLAMMATION (HCC): ICD-10-CM

## 2024-03-27 PROCEDURE — G8419 CALC BMI OUT NRM PARAM NOF/U: HCPCS | Performed by: NURSE PRACTITIONER

## 2024-03-27 PROCEDURE — 99214 OFFICE O/P EST MOD 30 MIN: CPT | Performed by: NURSE PRACTITIONER

## 2024-03-27 PROCEDURE — G8427 DOCREV CUR MEDS BY ELIG CLIN: HCPCS | Performed by: NURSE PRACTITIONER

## 2024-03-27 PROCEDURE — 4004F PT TOBACCO SCREEN RCVD TLK: CPT | Performed by: NURSE PRACTITIONER

## 2024-03-27 PROCEDURE — G8484 FLU IMMUNIZE NO ADMIN: HCPCS | Performed by: NURSE PRACTITIONER

## 2024-03-27 PROCEDURE — 3017F COLORECTAL CA SCREEN DOC REV: CPT | Performed by: NURSE PRACTITIONER

## 2024-03-27 RX ORDER — HYDROCODONE BITARTRATE AND ACETAMINOPHEN 5; 325 MG/1; MG/1
1 TABLET ORAL 3 TIMES DAILY PRN
Qty: 90 TABLET | Refills: 0 | Status: SHIPPED | OUTPATIENT
Start: 2024-03-28 | End: 2024-04-27

## 2024-03-27 ASSESSMENT — ENCOUNTER SYMPTOMS
TROUBLE SWALLOWING: 0
CONSTIPATION: 0
ABDOMINAL PAIN: 1
DIARRHEA: 0
BACK PAIN: 1
SHORTNESS OF BREATH: 0
COUGH: 0
EYES NEGATIVE: 1

## 2024-03-27 NOTE — PROGRESS NOTES
Jaimie RAGSDALE Monday  (1965)    3/27/2024    Subjective:     Jaimie RAGSDALE Monday is 58 y.o. female who complains today of:    Chief Complaint   Patient presents with    Follow-up    Abdominal Pain         Allergies:  Dye [iodides], Macrobid [nitrofurantoin monohydrate macrocrystals], and Flagyl [metronidazole]    Past Medical History:   Diagnosis Date    Anxiety     GERD (gastroesophageal reflux disease)     History of cellulitis and abscess     Irritable bowel syndrome      Past Surgical History:   Procedure Laterality Date    HERNIA REPAIR  1992, 2012    HYSTERECTOMY (CERVIX STATUS UNKNOWN)  2009    OTHER SURGICAL HISTORY  02/11/15    DR AMAYA  EXCISION OF LT LOWER QUAD NONHEALING SKIN LESION    SMALL INTESTINE SURGERY  2012    bowel obstruction @ [facility] Dr. Amaya     TUBAL LIGATION  1991    UPPER GASTROINTESTINAL ENDOSCOPY  [id number]    DR. MAGALLON     Family History   Problem Relation Age of Onset    Diabetes Mother     Heart Disease Mother         stints    Diabetes Sister     High Blood Pressure Sister      Social History     Socioeconomic History    Marital status:      Spouse name: Not on file    Number of children: Not on file    Years of education: Not on file    Highest education level: Not on file   Occupational History    Not on file   Tobacco Use    Smoking status: Some Days     Current packs/day: 0.25     Average packs/day: 0.3 packs/day for 40.2 years (10.1 ttl pk-yrs)     Types: Cigarettes     Start date: 1984     Passive exposure: Current    Smokeless tobacco: Never    Tobacco comments:     4/day - intermittent use   Vaping Use    Vaping Use: Never used   Substance and Sexual Activity    Alcohol use: No    Drug use: No    Sexual activity: Not on file   Other Topics Concern    Not on file   Social History Narrative    Not on file     Social Determinants of Health     Financial Resource Strain: Not on file   Food Insecurity: Not on file   Transportation Needs: Not on file   Physical

## 2024-03-29 ENCOUNTER — HOSPITAL ENCOUNTER (OUTPATIENT)
Dept: PHYSICAL THERAPY | Age: 59
Setting detail: THERAPIES SERIES
Discharge: HOME | End: 2024-03-29
Payer: COMMERCIAL

## 2024-03-29 NOTE — PROGRESS NOTES
Therapy                            Cancellation/No-show Note    Date: 2024  Patient: Jaimie RAGSDALE Monday (58 y.o. female)  : 1965  MRN:  [id number]  Referring Physician: Alina Davis, IVÁN - CNP    Medical Diagnosis: Right elbow pain [M25.521]  Acute pain of right shoulder [M25.511]      Visit Information:  Insurance: Payor: MEDICAL MUTUAL / Plan: MEDICAL MUTUAL [address] / Product Type: *No Product type* /   Visits to Date: 4   No Show/Cancelled Appts: 0 /       For today's appointment patient:  []  Cancelled  []  Rescheduled appointment  []  No-show   []  Called pt to remind of next appointment     Reason given by patient:  []  Patient ill  []  Conflicting appointment  []  No transportation    []  Conflict with work  []  No reason given  [x]  Other:  :\"Its good Friday, can't make it\"    [x] Pt has future appointments scheduled, no follow up needed  [] Pt requests to be on hold.    Reason:   If > 2 weeks please discuss with therapist.  [] Therapist to call pt for follow up     Comments:       Signature: Electronically signed by Ivette Lopez PT on 3/29/24 at 8:55 AM EDT

## 2024-04-03 ENCOUNTER — HOSPITAL ENCOUNTER (OUTPATIENT)
Dept: PHYSICAL THERAPY | Age: 59
Setting detail: THERAPIES SERIES
Discharge: HOME | End: 2024-04-03
Payer: COMMERCIAL

## 2024-04-03 PROCEDURE — 97110 THERAPEUTIC EXERCISES: CPT

## 2024-04-03 ASSESSMENT — PAIN DESCRIPTION - ORIENTATION: ORIENTATION: RIGHT

## 2024-04-03 ASSESSMENT — PAIN DESCRIPTION - DESCRIPTORS: DESCRIPTORS: ACHING

## 2024-04-03 ASSESSMENT — PAIN SCALES - GENERAL: PAINLEVEL_OUTOF10: 7

## 2024-04-03 ASSESSMENT — PAIN DESCRIPTION - LOCATION: LOCATION: SHOULDER;ELBOW

## 2024-04-03 NOTE — PROGRESS NOTES
[address]   [place] [address]  Phone:[phone]      Physical TherapyTreatment Note        Date: 4/3/2024  Patient: Jaimie RAGSDALE Monday  : 1965   Confirmed: Yes  MRN: [id number]  Referring Provider: Alina Davis, APRN - CNP  Medical Diagnosis: Right elbow pain [M25.521]  Acute pain of right shoulder [M25.511]   Treatment Diagnosis: R shoulder/elbow pain with decrease ROM/strength affecting overall functional abilty using L arm for ADL's    Visit Information:  Insurance: Payor: MEDICAL MUTUAL / Plan: MEDICAL MUTUAL [address] / Product Type: *No Product type* /   PT Visit Information  Onset Date: 23 (after a fall)  PT Insurance Information: Medical [place]  Total # of Visits Approved: 60  Total # of Visits to Date: 5  No Show: 0  Canceled Appointment: 1  Progress Note Counter:     Subjective Information:  Subjective: Pt states that she's hurting today. She was tossing and turning all night. Thinks she slept wrong. She tries to move her arm and stretch it. Thge weather change doesn't help either.  HEP Compliance:  [x] Good [] Fair  [] Poor [] Reports not doing due to:    Pain Screening  Patient Currently in Pain: Yes  Pain Assessment: 0-10  Pain Level: 7  Pain Location: Shoulder, Elbow  Pain Orientation: Right  Pain Descriptors: Aching    Treatment:  Exercises:  Exercises  Exercise 2: Table slides flexion 1pipq16 abduction 9hnnq57  Exercise 3: R bicep curl/tricep ext with RTB x 10 ea  Exercise 4: R UT stretch 3 x 30 sec ([place]mer today)  Exercise 5: RTB rows and lats IR/ER x10  Exercise 6: backward shl rolls and retraction x 10  Exercise 7: shoulder flexion and abduction 0bokh74 with cane seated/standing, stand cane ext 10 x 3\"  Exercise 8: IR stretch with strap 10 x 5\"  Treatment Reasoning  Limitations addressed: Strength, Pain modulation, Posture, Flexibility  Functional ability(s) targeted: Tolerance to age appropriate activities, Reaching overhead      *Indicates

## 2024-04-10 ENCOUNTER — HOSPITAL ENCOUNTER (OUTPATIENT)
Dept: PHYSICAL THERAPY | Age: 59
Setting detail: THERAPIES SERIES
Discharge: HOME | End: 2024-04-10
Payer: COMMERCIAL

## 2024-04-10 PROCEDURE — 97110 THERAPEUTIC EXERCISES: CPT

## 2024-04-10 ASSESSMENT — PAIN DESCRIPTION - PAIN TYPE: TYPE: CHRONIC PAIN

## 2024-04-10 ASSESSMENT — PAIN SCALES - GENERAL: PAINLEVEL_OUTOF10: 1

## 2024-04-10 ASSESSMENT — PAIN DESCRIPTION - DESCRIPTORS: DESCRIPTORS: ACHING;DULL

## 2024-04-10 ASSESSMENT — PAIN DESCRIPTION - ORIENTATION: ORIENTATION: RIGHT

## 2024-04-10 ASSESSMENT — PAIN DESCRIPTION - LOCATION: LOCATION: SHOULDER;ELBOW

## 2024-04-10 NOTE — PROGRESS NOTES
[address]   [place] [id number]  Phone:[phone]      Physical TherapyTreatment Note        Date: 4/10/2024  Patient: Jaimie RAGSDALE Monday  : 1965   Confirmed: Yes  MRN: [id number]  Referring Provider: Alina Davis, APRN - CNP      Medical Diagnosis: Right elbow pain [M25.521]  Acute pain of right shoulder [M25.511]      Treatment Diagnosis: R shoulder/elbow pain with decrease ROM/strength affecting overall functional abilty using L arm for ADL's    Visit Information:  Insurance: Payor: MEDICAL MUTUAL / Plan: MEDICAL MUTUAL [address] / Product Type: *No Product type* /   PT Visit Information  Onset Date: 23 (after a fall)  PT Insurance Information: Medical [place]  Total # of Visits Approved: 60  Total # of Visits to Date: 6  No Show: 0  Canceled Appointment: 1  Progress Note Counter:     Subjective Information:  Subjective: Pt reported R shoulder is painful all around,  like a dull ache. All movements aggravate her. At night  the R shoulder will throb and the pain will go into the elbow. Pain levels still elevated but ROM and strength is improving.  HEP Compliance:  [x] Good [] Fair [] Poor [] Reports not doing due to:    Pain Screening  Patient Currently in Pain: Yes  Pain Assessment: 0-10  Pain Level: 1  Worst Pain Level: 8  Pain Type: Chronic pain  Pain Location: Shoulder, Elbow  Pain Orientation: Right  Pain Descriptors: Aching, Dull    Treatment:  Exercises:  Exercises  Exercise 2: Table slides flexion 1ipax54 abduction 8rxxe19  Exercise 3: R bicep curl/tricep ext with RTB x 10 ea  Exercise 4: R UT stretch 3 x 30 sec ([place] today)  Exercise 5: RTB rows and lats IR/ER x15  Exercise 6: backward shl rolls and retraction x 10  Exercise 7: shoulder flexion and abduction 8nkfx84 with cane standing, stand cane ext 10 x 3\"  Exercise 8: IR stretch with strap 10 x 5\" standing  Treatment Reasoning  Limitations addressed: Strength, Pain modulation, Posture,

## 2024-04-17 ENCOUNTER — HOSPITAL ENCOUNTER (OUTPATIENT)
Dept: PHYSICAL THERAPY | Age: 59
Setting detail: THERAPIES SERIES
Discharge: HOME | End: 2024-04-17
Payer: COMMERCIAL

## 2024-04-17 PROCEDURE — 97110 THERAPEUTIC EXERCISES: CPT

## 2024-04-17 PROCEDURE — 97140 MANUAL THERAPY 1/> REGIONS: CPT

## 2024-04-17 ASSESSMENT — PAIN SCALES - GENERAL: PAINLEVEL_OUTOF10: 1

## 2024-04-17 ASSESSMENT — PAIN DESCRIPTION - ORIENTATION: ORIENTATION: RIGHT

## 2024-04-17 ASSESSMENT — PAIN DESCRIPTION - LOCATION: LOCATION: SHOULDER;ELBOW

## 2024-04-17 ASSESSMENT — PAIN DESCRIPTION - PAIN TYPE: TYPE: CHRONIC PAIN

## 2024-04-24 ENCOUNTER — HOSPITAL ENCOUNTER (OUTPATIENT)
Dept: PHYSICAL THERAPY | Age: 59
Setting detail: THERAPIES SERIES
Discharge: HOME | End: 2024-04-24
Payer: COMMERCIAL

## 2024-04-24 ENCOUNTER — OFFICE VISIT (OUTPATIENT)
Dept: PAIN MANAGEMENT | Age: 59
End: 2024-04-24
Payer: COMMERCIAL

## 2024-04-24 VITALS
BODY MASS INDEX: 25.1 KG/M2 | DIASTOLIC BLOOD PRESSURE: 82 MMHG | WEIGHT: 147 LBS | SYSTOLIC BLOOD PRESSURE: 120 MMHG | HEIGHT: 64 IN

## 2024-04-24 DIAGNOSIS — G89.29 ABDOMINAL PAIN, CHRONIC, RIGHT UPPER QUADRANT: ICD-10-CM

## 2024-04-24 DIAGNOSIS — F11.90 CHRONIC, CONTINUOUS USE OF OPIOIDS: ICD-10-CM

## 2024-04-24 DIAGNOSIS — G89.29 CHRONIC PAIN OF LEFT KNEE: ICD-10-CM

## 2024-04-24 DIAGNOSIS — M46.1 SACROILIAC INFLAMMATION (HCC): ICD-10-CM

## 2024-04-24 DIAGNOSIS — M25.562 CHRONIC PAIN OF LEFT KNEE: ICD-10-CM

## 2024-04-24 DIAGNOSIS — M25.511 ACUTE PAIN OF RIGHT SHOULDER: ICD-10-CM

## 2024-04-24 DIAGNOSIS — R10.11 ABDOMINAL PAIN, CHRONIC, RIGHT UPPER QUADRANT: ICD-10-CM

## 2024-04-24 DIAGNOSIS — M47.817 LUMBOSACRAL SPONDYLOSIS WITHOUT MYELOPATHY: ICD-10-CM

## 2024-04-24 DIAGNOSIS — M25.521 RIGHT ELBOW PAIN: ICD-10-CM

## 2024-04-24 PROCEDURE — G8427 DOCREV CUR MEDS BY ELIG CLIN: HCPCS | Performed by: NURSE PRACTITIONER

## 2024-04-24 PROCEDURE — 97140 MANUAL THERAPY 1/> REGIONS: CPT

## 2024-04-24 PROCEDURE — 99214 OFFICE O/P EST MOD 30 MIN: CPT | Performed by: NURSE PRACTITIONER

## 2024-04-24 PROCEDURE — G8419 CALC BMI OUT NRM PARAM NOF/U: HCPCS | Performed by: NURSE PRACTITIONER

## 2024-04-24 PROCEDURE — 4004F PT TOBACCO SCREEN RCVD TLK: CPT | Performed by: NURSE PRACTITIONER

## 2024-04-24 PROCEDURE — 97110 THERAPEUTIC EXERCISES: CPT

## 2024-04-24 PROCEDURE — 3017F COLORECTAL CA SCREEN DOC REV: CPT | Performed by: NURSE PRACTITIONER

## 2024-04-24 RX ORDER — HYDROCODONE BITARTRATE AND ACETAMINOPHEN 5; 325 MG/1; MG/1
1 TABLET ORAL 3 TIMES DAILY PRN
Qty: 90 TABLET | Refills: 0 | Status: SHIPPED | OUTPATIENT
Start: 2024-04-27 | End: 2024-05-27

## 2024-04-24 ASSESSMENT — PAIN SCALES - GENERAL: PAINLEVEL_OUTOF10: 3

## 2024-04-24 ASSESSMENT — ENCOUNTER SYMPTOMS
GASTROINTESTINAL NEGATIVE: 1
BACK PAIN: 1
SHORTNESS OF BREATH: 0
TROUBLE SWALLOWING: 0
EYES NEGATIVE: 1
COUGH: 0
DIARRHEA: 0

## 2024-04-24 ASSESSMENT — PAIN DESCRIPTION - LOCATION: LOCATION: SHOULDER;ELBOW

## 2024-04-24 ASSESSMENT — PAIN DESCRIPTION - PAIN TYPE: TYPE: CHRONIC PAIN

## 2024-04-24 ASSESSMENT — PAIN DESCRIPTION - ORIENTATION: ORIENTATION: RIGHT

## 2024-04-24 NOTE — PROGRESS NOTES
Jaimie RAGSDALE Monday  (1965)    4/24/2024    Subjective:     Jaimie RAGSDALE Monday is 58 y.o. female who complains today of:    Chief Complaint   Patient presents with    Follow-up     Pt states soreness, just got done with physical therapy.   PT is going well, states it is relieving some of the pain     Leg Pain     Both     Abdominal Pain    Shoulder Pain     Right          Allergies:  Dye [iodides], Macrobid [nitrofurantoin monohydrate macrocrystals], and Flagyl [metronidazole]    Past Medical History:   Diagnosis Date    Anxiety     GERD (gastroesophageal reflux disease)     History of cellulitis and abscess     Irritable bowel syndrome      Past Surgical History:   Procedure Laterality Date    HERNIA REPAIR  1992, 2012    HYSTERECTOMY (CERVIX STATUS UNKNOWN)  2009    OTHER SURGICAL HISTORY  02/11/15    DR AMAYA  EXCISION OF LT LOWER QUAD NONHEALING SKIN LESION    SMALL INTESTINE SURGERY  2012    bowel obstruction @ [facility] Dr. Amaya     TUBAL LIGATION  1991    UPPER GASTROINTESTINAL ENDOSCOPY  [id number]    DR. MAGALLON     Family History   Problem Relation Age of Onset    Diabetes Mother     Heart Disease Mother         stints    Diabetes Sister     High Blood Pressure Sister      Social History     Socioeconomic History    Marital status:      Spouse name: Not on file    Number of children: Not on file    Years of education: Not on file    Highest education level: Not on file   Occupational History    Not on file   Tobacco Use    Smoking status: Some Days     Current packs/day: 0.25     Average packs/day: 0.2 packs/day for 40.3 years (10.1 ttl pk-yrs)     Types: Cigarettes     Start date: 1984     Passive exposure: Current    Smokeless tobacco: Never    Tobacco comments:     4/day - intermittent use   Vaping Use    Vaping Use: Never used   Substance and Sexual Activity    Alcohol use: No    Drug use: No    Sexual activity: Not on file   Other Topics Concern    Not on file   Social History Narrative    Not

## 2024-04-24 NOTE — PROGRESS NOTES
re-education, Home exercise program, Modalities  Modalities: Heat/Cold, Ultrasound, E-stim - unattended  Pt to continue current HEP.  See objective section for any therapeutic exercise changes, additions or modifications this date.    Therapy Time:      PT Individual Minutes  Time In: 0930  Time Out: 1008  Minutes: 38  Timed Code Treatment Minutes: 38 Minutes    Timed Activity Minutes Units   Ther Ex 30 2   Manual  8 1     Electronically signed by Jeanne Garcia PTA on 4/24/24 at 10:15 AM EDT

## 2024-05-01 ENCOUNTER — HOSPITAL ENCOUNTER (OUTPATIENT)
Dept: PHYSICAL THERAPY | Age: 59
Setting detail: THERAPIES SERIES
Discharge: HOME | End: 2024-05-01

## 2024-05-01 NOTE — PROGRESS NOTES
Therapy                            Cancellation/No-show Note    Date: 2024  Patient: Jaimie RAGSDALE Monday (58 y.o. female)  : 1965  MRN:  [id number]  Referring Physician: Alina Davis APRN - CNP    Medical Diagnosis: Right elbow pain [M25.521]  Acute pain of right shoulder [M25.511]      Visit Information:  Insurance: Payor: MEDICAL MUTUAL / Plan: MEDICAL MUTUAL [address] / Product Type: *No Product type* /   Visits to Date: 8   No Show/Cancelled Appts: 0 / 2      For today's appointment patient:  [x]  Cancelled  []  Rescheduled appointment  []  No-show   []  Called pt to remind of next appointment     Reason given by patient:  []  Patient ill  [x]  Conflicting appointment  []  No transportation    []  Conflict with work  []  No reason given  []  Other:      [x] Pt has future appointments scheduled, no follow up needed  [] Pt requests to be on hold.    Reason:   If > 2 weeks please discuss with therapist.  [] Therapist to call pt for follow up     Comments:       Signature: Electronically signed by Margo Hernández PTA on 24 at 10:19 AM EDT

## 2024-05-10 ENCOUNTER — HOSPITAL ENCOUNTER (OUTPATIENT)
Dept: PHYSICAL THERAPY | Age: 59
Setting detail: THERAPIES SERIES
Discharge: HOME | End: 2024-05-10
Payer: COMMERCIAL

## 2024-05-10 PROCEDURE — 97110 THERAPEUTIC EXERCISES: CPT

## 2024-05-10 ASSESSMENT — PAIN DESCRIPTION - ORIENTATION: ORIENTATION: RIGHT

## 2024-05-10 ASSESSMENT — PAIN DESCRIPTION - PAIN TYPE: TYPE: CHRONIC PAIN

## 2024-05-10 ASSESSMENT — PAIN DESCRIPTION - DESCRIPTORS: DESCRIPTORS: ACHING

## 2024-05-10 ASSESSMENT — PAIN DESCRIPTION - LOCATION: LOCATION: SHOULDER;ELBOW

## 2024-05-10 ASSESSMENT — PAIN SCALES - GENERAL: PAINLEVEL_OUTOF10: 1

## 2024-05-10 NOTE — PROGRESS NOTES
[address]   [place] [address]  Phone:[phone]      Physical TherapyTreatment Note        Date: 5/10/2024  Patient: Jaimie RAGSDALE Monday  : 1965   Confirmed: Yes  MRN: [id number]  Referring Provider: Alina Davis, IVÁN - CNP  Medical Diagnosis: Right elbow pain [M25.521]  Acute pain of right shoulder [M25.511]   Treatment Diagnosis: R shoulder/elbow pain with decrease ROM/strength affecting overall functional abilty using L arm for ADL's    Visit Information:  Insurance: Payor: [employer] / Plan: [employer] [address] / Product Type: *No Product type* /   PT Visit Information  Onset Date: 23 (after a fall)  PT Insurance Information: Medical [place]  Total # of Visits Approved: 60  Total # of Visits to Date: 9  No Show: 0  Canceled Appointment: 2  Progress Note Counter:     Subjective Information:  Subjective: Pt states that her neck and shoulder feels pretty good. She thinks the exercises in PT and doing them at home is helping. Pt has an upset stomach and doesn't know if she will be able to get through her therapy session today but wants to try.  HEP Compliance:  [x] Good [] Fair  [] Poor [] Reports not doing due to:    Pain Screening  Patient Currently in Pain: Yes  Pain Assessment: 0-10  Pain Level: 1  Pain Type: Chronic pain  Pain Location: Shoulder, Elbow  Pain Orientation: Right  Pain Descriptors: Aching    Treatment:  Exercises:  Exercises  Exercise 3: R bicep curl with GTB x 15 ea  Exercise 4: R UT stretch 3 x 30 sec  Exercise 6: backward shl rolls and retraction x 15  Treatment Reasoning  Limitations addressed: Strength, Pain modulation, Posture, Flexibility  Functional ability(s) targeted: Tolerance to age appropriate activities, Reaching overhead      *Indicates exercise, modality, or manual techniques to be initiated when appropriate      Assessment:   Body Structures, Functions, Activity Limitations Requiring Skilled Therapeutic Intervention:

## 2024-05-15 ENCOUNTER — HOSPITAL ENCOUNTER (OUTPATIENT)
Dept: PHYSICAL THERAPY | Age: 59
Setting detail: THERAPIES SERIES
Discharge: HOME | End: 2024-05-15
Payer: COMMERCIAL

## 2024-05-15 PROCEDURE — 97110 THERAPEUTIC EXERCISES: CPT

## 2024-05-15 ASSESSMENT — PAIN DESCRIPTION - LOCATION: LOCATION: SHOULDER;ELBOW

## 2024-05-15 ASSESSMENT — PAIN DESCRIPTION - PAIN TYPE: TYPE: SURGICAL PAIN

## 2024-05-15 ASSESSMENT — PAIN DESCRIPTION - DESCRIPTORS: DESCRIPTORS: ACHING

## 2024-05-15 ASSESSMENT — PAIN SCALES - GENERAL: PAINLEVEL_OUTOF10: 5

## 2024-05-15 ASSESSMENT — PAIN DESCRIPTION - ORIENTATION: ORIENTATION: RIGHT

## 2024-05-15 NOTE — PROGRESS NOTES
[address] [address]  Phone:[phone]      Physical TherapyTreatment Note        Date: 5/15/2024  Patient: Jaimie RAGSDALE Monday  : 1965   Confirmed: Yes  MRN: [id number]  Referring Provider: Alina Davis, APRN - CNP      Medical Diagnosis: Right elbow pain [M25.521]  Acute pain of right shoulder [M25.511]      Treatment Diagnosis: R shoulder/elbow pain with decrease ROM/strength affecting overall functional abilty using L arm for ADL's    Visit Information:  Insurance: Payor: [employer] / Plan: MED[employer] [address] / Product Type: *No Product type* /   PT Visit Information  Onset Date: 23 (after a fall)  PT Insurance Information: Medical [place]  Total # of Visits Approved: 60  Total # of Visits to Date: 10  No Show: 0  Canceled Appointment: 2  Progress Note Counter: 10/12    Subjective Information:  Subjective: Pt reports that shoulder is hurting more today.Also stomach is still botherin her.  HEP Compliance:  [x] Good [] Fair [] Poor [] Reports not doing due to:    Pain Screening  Patient Currently in Pain: Yes  Pain Assessment: 0-10  Pain Level: 5  Pain Type: Surgical pain  Pain Location: Shoulder, Elbow  Pain Orientation: Right  Pain Descriptors: Aching    Treatment:  Exercises:  Exercises  Exercise 3: R bicep curl with GTB x 15 ea  Exercise 4: R UT stretch 3 x 30 sec  Exercise 5: BTB rows x15 and latsx15  Exercise 6: backward shl rolls and retraction x 15  Exercise 7: shoulder flexion and abduction [id number] with cane standing, stand cane ext 10 x 3\"      *Indicates exercise, modality, or manual techniques to be initiated when appropriate    Objective Measures:    LTG 3 Current Status:: Pain with AROm but R=L  LTG 4 Current Status:: R shoulder flexion and abduction 4-/5 with pain         Assessment:   Body Structures, Functions, Activity Limitations Requiring Skilled Therapeutic Intervention: Decreased posture, Increased pain, Decreased ADL

## 2024-05-22 ENCOUNTER — OFFICE VISIT (OUTPATIENT)
Dept: PAIN MANAGEMENT | Age: 59
End: 2024-05-22
Payer: COMMERCIAL

## 2024-05-22 ENCOUNTER — HOSPITAL ENCOUNTER (OUTPATIENT)
Dept: PHYSICAL THERAPY | Age: 59
Setting detail: THERAPIES SERIES
Discharge: HOME | End: 2024-05-22
Payer: COMMERCIAL

## 2024-05-22 VITALS
HEIGHT: 64 IN | TEMPERATURE: 97.5 F | DIASTOLIC BLOOD PRESSURE: 70 MMHG | SYSTOLIC BLOOD PRESSURE: 120 MMHG | BODY MASS INDEX: 25.1 KG/M2 | WEIGHT: 147 LBS

## 2024-05-22 DIAGNOSIS — M25.521 RIGHT ELBOW PAIN: ICD-10-CM

## 2024-05-22 DIAGNOSIS — G89.29 ABDOMINAL PAIN, CHRONIC, RIGHT UPPER QUADRANT: ICD-10-CM

## 2024-05-22 DIAGNOSIS — M25.562 CHRONIC PAIN OF LEFT KNEE: ICD-10-CM

## 2024-05-22 DIAGNOSIS — G89.29 CHRONIC PAIN OF LEFT KNEE: ICD-10-CM

## 2024-05-22 DIAGNOSIS — M47.817 LUMBOSACRAL SPONDYLOSIS WITHOUT MYELOPATHY: ICD-10-CM

## 2024-05-22 DIAGNOSIS — M25.511 ACUTE PAIN OF RIGHT SHOULDER: ICD-10-CM

## 2024-05-22 DIAGNOSIS — F11.90 CHRONIC, CONTINUOUS USE OF OPIOIDS: ICD-10-CM

## 2024-05-22 DIAGNOSIS — R10.11 ABDOMINAL PAIN, CHRONIC, RIGHT UPPER QUADRANT: ICD-10-CM

## 2024-05-22 DIAGNOSIS — M46.1 SACROILIAC INFLAMMATION (HCC): ICD-10-CM

## 2024-05-22 PROCEDURE — G8427 DOCREV CUR MEDS BY ELIG CLIN: HCPCS | Performed by: NURSE PRACTITIONER

## 2024-05-22 PROCEDURE — 4004F PT TOBACCO SCREEN RCVD TLK: CPT | Performed by: NURSE PRACTITIONER

## 2024-05-22 PROCEDURE — 3017F COLORECTAL CA SCREEN DOC REV: CPT | Performed by: NURSE PRACTITIONER

## 2024-05-22 PROCEDURE — 99214 OFFICE O/P EST MOD 30 MIN: CPT | Performed by: NURSE PRACTITIONER

## 2024-05-22 PROCEDURE — 97110 THERAPEUTIC EXERCISES: CPT

## 2024-05-22 PROCEDURE — G8419 CALC BMI OUT NRM PARAM NOF/U: HCPCS | Performed by: NURSE PRACTITIONER

## 2024-05-22 RX ORDER — HYDROCODONE BITARTRATE AND ACETAMINOPHEN 5; 325 MG/1; MG/1
1 TABLET ORAL 3 TIMES DAILY PRN
Qty: 90 TABLET | Refills: 0 | Status: SHIPPED | OUTPATIENT
Start: 2024-05-27 | End: 2024-06-26

## 2024-05-22 ASSESSMENT — ENCOUNTER SYMPTOMS
EYES NEGATIVE: 1
COUGH: 0
BACK PAIN: 1
CONSTIPATION: 0
TROUBLE SWALLOWING: 0
GASTROINTESTINAL NEGATIVE: 1
SHORTNESS OF BREATH: 0
DIARRHEA: 0

## 2024-05-22 ASSESSMENT — PAIN DESCRIPTION - DESCRIPTORS: DESCRIPTORS: ACHING

## 2024-05-22 ASSESSMENT — PAIN SCALES - GENERAL: PAINLEVEL_OUTOF10: 2

## 2024-05-22 ASSESSMENT — PAIN DESCRIPTION - LOCATION: LOCATION: SHOULDER

## 2024-05-22 ASSESSMENT — PAIN DESCRIPTION - ORIENTATION: ORIENTATION: RIGHT

## 2024-05-22 ASSESSMENT — PAIN DESCRIPTION - PAIN TYPE: TYPE: CHRONIC PAIN

## 2024-05-22 NOTE — PROGRESS NOTES
[address]     [place] [address]      Phone:[phone]      PHYSICAL THERAPY PLAN OF CARE     [] Certification  [] Recertification []  Plan of Care  [] Progress Note [x] Discharge      Referring Provider: Alina Davis, IVÁN - JULIÁN    From:  [facility]    Patient: Jaimie RAGSDALE Monday (58 y.o. female) : 1965 Date: 2024   Medical Diagnosis: Right elbow pain [M25.521]  Acute pain of right shoulder [M25.511]    Treatment Diagnosis: R shoulder/elbow pain with decrease ROM/strength affecting overall functional abilty using L arm for ADL's      Progress Report Period from: 24 to 2024    Visits to Date: 11 No Show: 0 Cancelled Appts: 2    OBJECTIVE:   Short Term Goals - Time Frame for Short Term Goals: 2 weeks    Goals Current/Discharge status  Status   Short Term Goal 1: Decrease R shoulder/elbow pain 50% to assist with improved functional gains.  STG 1 Current Status:: Pt reports R shoulder 60% elbow 100% better.   Met   Short Term Goal 2: Pain-free R shoulder flex 180 and Abd 160 PROM to WNL allowing an increase in ADL tolerance.  STG 2 Current Status:: pain at end range standing PROM [id number] abduction 130   Not Met     Long Term Goals - Time Frame for Long Term Goals : 4-6 weeks  Goals Current/ Discharge status Status   Long Term Goal 1: Indep HEP for symptom management LTG 1 Current Status:: I with HEP   Met   Long Term Goal 2: Pt demo improved overall function by reporting greater than 80% per functional survey score LTG 2 Current Status:: UEFI 56/80 70% functional   Not Met   Long Term Goal 3: Pain-free R shoulder AROM to equal L shoulder allowing an increase in ADL tolerance. LTG 3 Current Status:: Pain with AROM but R=L taken 5/15/24   Partially met   Long Term Goal 4: Improve R shoulder strength 4/5 to allow patient to reach overhead without difficulty LTG 4 Current Status:: R shoulder flexion and abduction 4-/5 with pain. Extension ER/IR 4+/5

## 2024-05-22 NOTE — PROGRESS NOTES
[address]   [place] [id number]  Phone:[phone]      Physical TherapyTreatment Note        Date: 2024  Patient: Jaimie RAGSDALE Monday  : 1965   Confirmed: Yes  MRN: [id number]  Referring Provider: Alina Davis, IVÁN - CNP      Medical Diagnosis: Right elbow pain [M25.521]  Acute pain of right shoulder [M25.511]      Treatment Diagnosis: R shoulder/elbow pain with decrease ROM/strength affecting overall functional abilty using L arm for ADL's    Visit Information:  Insurance: Payor: [employer] / Plan: MED[employer]UAL [address] / Product Type: *No Product type* /   PT Visit Information  Onset Date: 23 (after a fall)  PT Insurance Information: Medical [place]  Total # of Visits Approved: 60  Total # of Visits to Date:   No Show: 0  Canceled Appointment: 2  Progress Note Counter:     Subjective Information:  Subjective: Pt reports that she is going to get MRI.  HEP Compliance:  [x] Good [] Fair [] Poor [] Reports not doing due to:    Pain Screening  Patient Currently in Pain: Yes  Pain Assessment: 0-10  Pain Level: 2  Pain Type: Chronic pain  Pain Location: Shoulder  Pain Orientation: Right  Pain Descriptors: Aching    Treatment:  Exercises:  Exercises  Exercise 2: objective measurments 15 min     Pt declined exercises.     *Indicates exercise, modality, or manual techniques to be initiated when appropriate    Objective Measures:     STG 1 Current Status:: Pt reports R shoulder 60% elbow 100% better.  STG 2 Current Status:: pain at end range standing PROM [id number] abduction 130     LTG 1 Current Status:: I with HEP  LTG 2 Current Status:: UEFI 56/80 70% functional  LTG 3 Current Status:: Pain with AROM but R=L taken 5/15/24  LTG 4 Current Status:: R shoulder flexion and abduction 4-/5 with pain. Extension ER/IR 4+/5         Assessment:   Body Structures, Functions, Activity Limitations Requiring Skilled Therapeutic Intervention: Decreased posture,

## 2024-05-22 NOTE — PROGRESS NOTES
oriented x 3.   Recent and remote memory is intact.  Mood and affect, judgement and insight are normal.  No signs of distress, no dyspnea or SOB noted. HEENT: PERRL.  Neck is supple, trachea midline.  No lymphadenopathy noted.   Strong grasp bilaterally.  Negative SLR.   Surgical scars noted. Decreased ROM with flexion and extension of low back. TTP to lumbar spine with palpation.  Decreased ROM Lt hip with discomfort. Tightness in both hamstrings noted.  Balance and coordination normal.  Strength is functional for ambulation. Full  ROM neck. Decreased ROM of Rt shoulder. TTP today over anterior Rt shoulder and biceps tendon today.   Non -TTP over Rt elbow.  Strong grasp bilaterally. Cranial nerves II-XII are intact.          Assessment:      Diagnosis Orders   1. Abdominal pain, chronic, right upper quadrant  HYDROcodone-acetaminophen (NORCO) 5-325 MG per tablet      2. Lumbosacral spondylosis without myelopathy  HYDROcodone-acetaminophen (NORCO) 5-325 MG per tablet      3. Chronic, continuous use of opioids  HYDROcodone-acetaminophen (NORCO) 5-325 MG per tablet      4. Chronic pain of left knee  HYDROcodone-acetaminophen (NORCO) 5-325 MG per tablet      5. Sacroiliac inflammation (HCC)  HYDROcodone-acetaminophen (NORCO) 5-325 MG per tablet      6. Right elbow pain  HYDROcodone-acetaminophen (NORCO) 5-325 MG per tablet      7. Acute pain of right shoulder  HYDROcodone-acetaminophen (NORCO) 5-325 MG per tablet    MRI SHOULDER RIGHT WO CONTRAST          Plan:     Periodic Controlled Substance Monitoring: Possible medication side effects, risk of tolerance/dependence & alternative treatments discussed., No signs of potential drug abuse or diversion identified., Assessed functional status (ability to engage in work or other purposeful activities, the pain intensity and its interference with activities of daily living, quality of family life and social activities, and the physical activity), Obtaining appropriate

## 2024-06-03 DIAGNOSIS — F40.240 CLAUSTROPHOBIA: ICD-10-CM

## 2024-06-03 RX ORDER — DIAZEPAM 5 MG/1
5 TABLET ORAL DAILY PRN
Qty: 1 TABLET | Refills: 0 | Status: SHIPPED | OUTPATIENT
Start: 2024-06-03 | End: 2024-06-04

## 2024-06-03 NOTE — TELEPHONE ENCOUNTER
Office note 5/22/2024 reviewed, MRI right shoulder ordered, patient will need Valium prior to this due to anxiety and will call when MRI scheduled.    - Diazepam Valium 5 mg 1 tablet #1 no refills for anxiety for MRI.  Do not drive while on this medication.    Controlled Substance Monitoring:    Acute and Chronic Pain Monitoring:   RX Monitoring Periodic Controlled Substance Monitoring   6/3/2024   6:31 PM Obtaining appropriate analgesic effect of treatment.

## 2024-06-03 NOTE — TELEPHONE ENCOUNTER
Requested Prescriptions     Pending Prescriptions Disp Refills    diazePAM (VALIUM) 5 MG tablet 2 tablet 0     Sig: Take 1 tablet by mouth 2 times daily as needed (MRI Sedation) for up to 1 day. Take 5 mg by mouth thirty minutes prior to study.  Do not drive while on this medication.       Patient last seen on:  5/22    Date of last surgery:  n/a    Date of last refill:  8/17    Reason for request:  PT is asking for valium for MRI, she says she only needs one.     Request date for pharmacy pick-up:  6/3    Next office visit date: 6/19/2024    Dye [iodides], Macrobid [nitrofurantoin monohydrate macrocrystals], and Flagyl [metronidazole]

## 2024-06-04 ENCOUNTER — HOSPITAL ENCOUNTER (OUTPATIENT)
Dept: MRI IMAGING | Age: 59
Discharge: HOME | End: 2024-06-06
Payer: COMMERCIAL

## 2024-06-04 DIAGNOSIS — M25.511 ACUTE PAIN OF RIGHT SHOULDER: ICD-10-CM

## 2024-06-04 PROCEDURE — 73221 MRI JOINT UPR EXTREM W/O DYE: CPT

## 2024-06-04 NOTE — TELEPHONE ENCOUNTER
I called patient and left message to advise per Dr. Sullivan sent Valium to pharmacy. Do not drive while on this medication.

## 2024-06-05 ENCOUNTER — TELEPHONE (OUTPATIENT)
Dept: PAIN MANAGEMENT | Age: 59
End: 2024-06-05

## 2024-06-05 NOTE — TELEPHONE ENCOUNTER
----- Message from IVÁN Cueto CNP sent at 6/5/2024  8:07 AM EDT -----  MRI report of the right shoulder from 6/4/2024 reviewed.  Impression shows tendinitis of the supraspinatus and infraspinatus tendons, slight subacromial/subdeltoid bursitis, slight degenerative changes of AC joint.    MA, please tell patient MRI of the right shoulder reviewed showing inflammation and some arthritic changes.  Will discuss in details at follow-up.

## 2024-06-19 ENCOUNTER — OFFICE VISIT (OUTPATIENT)
Dept: PAIN MANAGEMENT | Age: 59
End: 2024-06-19
Payer: COMMERCIAL

## 2024-06-19 VITALS
HEIGHT: 64 IN | SYSTOLIC BLOOD PRESSURE: 126 MMHG | BODY MASS INDEX: 24.92 KG/M2 | DIASTOLIC BLOOD PRESSURE: 68 MMHG | WEIGHT: 146 LBS

## 2024-06-19 DIAGNOSIS — M25.562 CHRONIC PAIN OF LEFT KNEE: ICD-10-CM

## 2024-06-19 DIAGNOSIS — M25.521 RIGHT ELBOW PAIN: ICD-10-CM

## 2024-06-19 DIAGNOSIS — G89.29 ABDOMINAL PAIN, CHRONIC, RIGHT UPPER QUADRANT: ICD-10-CM

## 2024-06-19 DIAGNOSIS — M46.1 SACROILIAC INFLAMMATION (HCC): ICD-10-CM

## 2024-06-19 DIAGNOSIS — R10.11 ABDOMINAL PAIN, CHRONIC, RIGHT UPPER QUADRANT: ICD-10-CM

## 2024-06-19 DIAGNOSIS — M47.817 LUMBOSACRAL SPONDYLOSIS WITHOUT MYELOPATHY: ICD-10-CM

## 2024-06-19 DIAGNOSIS — M25.511 ACUTE PAIN OF RIGHT SHOULDER: ICD-10-CM

## 2024-06-19 DIAGNOSIS — G89.29 CHRONIC PAIN OF LEFT KNEE: ICD-10-CM

## 2024-06-19 DIAGNOSIS — F11.90 CHRONIC, CONTINUOUS USE OF OPIOIDS: ICD-10-CM

## 2024-06-19 PROCEDURE — 4004F PT TOBACCO SCREEN RCVD TLK: CPT | Performed by: NURSE PRACTITIONER

## 2024-06-19 PROCEDURE — G8419 CALC BMI OUT NRM PARAM NOF/U: HCPCS | Performed by: NURSE PRACTITIONER

## 2024-06-19 PROCEDURE — 3017F COLORECTAL CA SCREEN DOC REV: CPT | Performed by: NURSE PRACTITIONER

## 2024-06-19 PROCEDURE — 99214 OFFICE O/P EST MOD 30 MIN: CPT | Performed by: NURSE PRACTITIONER

## 2024-06-19 PROCEDURE — G8427 DOCREV CUR MEDS BY ELIG CLIN: HCPCS | Performed by: NURSE PRACTITIONER

## 2024-06-19 RX ORDER — HYDROCODONE BITARTRATE AND ACETAMINOPHEN 5; 325 MG/1; MG/1
1 TABLET ORAL 3 TIMES DAILY PRN
Qty: 90 TABLET | Refills: 0 | Status: SHIPPED | OUTPATIENT
Start: 2024-06-26 | End: 2024-07-26

## 2024-06-19 ASSESSMENT — ENCOUNTER SYMPTOMS
TROUBLE SWALLOWING: 0
CONSTIPATION: 0
BACK PAIN: 1
ABDOMINAL PAIN: 1
EYES NEGATIVE: 1
COUGH: 0
SHORTNESS OF BREATH: 0
DIARRHEA: 0

## 2024-07-25 ENCOUNTER — OFFICE VISIT (OUTPATIENT)
Dept: PAIN MANAGEMENT | Age: 59
End: 2024-07-25

## 2024-07-25 VITALS
TEMPERATURE: 97.2 F | WEIGHT: 142 LBS | BODY MASS INDEX: 26.13 KG/M2 | HEIGHT: 62 IN | SYSTOLIC BLOOD PRESSURE: 118 MMHG | DIASTOLIC BLOOD PRESSURE: 78 MMHG

## 2024-07-25 DIAGNOSIS — R10.11 ABDOMINAL PAIN, CHRONIC, RIGHT UPPER QUADRANT: ICD-10-CM

## 2024-07-25 DIAGNOSIS — M25.562 CHRONIC PAIN OF LEFT KNEE: ICD-10-CM

## 2024-07-25 DIAGNOSIS — M25.521 RIGHT ELBOW PAIN: ICD-10-CM

## 2024-07-25 DIAGNOSIS — M25.511 ACUTE PAIN OF RIGHT SHOULDER: ICD-10-CM

## 2024-07-25 DIAGNOSIS — G89.29 CHRONIC PAIN OF LEFT KNEE: ICD-10-CM

## 2024-07-25 DIAGNOSIS — F11.90 CHRONIC, CONTINUOUS USE OF OPIOIDS: ICD-10-CM

## 2024-07-25 DIAGNOSIS — G89.29 ABDOMINAL PAIN, CHRONIC, RIGHT UPPER QUADRANT: ICD-10-CM

## 2024-07-25 DIAGNOSIS — M46.1 SACROILIAC INFLAMMATION (HCC): ICD-10-CM

## 2024-07-25 DIAGNOSIS — M47.817 LUMBOSACRAL SPONDYLOSIS WITHOUT MYELOPATHY: ICD-10-CM

## 2024-07-25 PROCEDURE — 99214 OFFICE O/P EST MOD 30 MIN: CPT | Performed by: NURSE PRACTITIONER

## 2024-07-25 RX ORDER — HYDROCODONE BITARTRATE AND ACETAMINOPHEN 5; 325 MG/1; MG/1
1 TABLET ORAL 3 TIMES DAILY PRN
Qty: 90 TABLET | Refills: 0 | Status: SHIPPED | OUTPATIENT
Start: 2024-07-26 | End: 2024-08-25

## 2024-07-25 ASSESSMENT — ENCOUNTER SYMPTOMS
CONSTIPATION: 0
RESPIRATORY NEGATIVE: 1
BACK PAIN: 1
DIARRHEA: 0

## 2024-07-25 NOTE — PROGRESS NOTES
Patient: Jaimie RAGSDALE Monday  YOB: 1965  Date: 7/25/24        Subjective:     Jaimie RAGSDALE Monday is a 58 y.o. female who complains today of:    Chief Complaint   Patient presents with    Shoulder Pain     right    Back Pain    Leg Pain         Allergies:  Dye [iodides], Macrobid [nitrofurantoin monohydrate macrocrystals], and Flagyl [metronidazole]    Past Medical History:   Diagnosis Date    Anxiety     GERD (gastroesophageal reflux disease)     History of cellulitis and abscess     Irritable bowel syndrome      Past Surgical History:   Procedure Laterality Date    HERNIA REPAIR  1992, 2012    HYSTERECTOMY (CERVIX STATUS UNKNOWN)  2009    OTHER SURGICAL HISTORY  02/11/15    DR AMAYA  EXCISION OF LT LOWER QUAD NONHEALING SKIN LESION    SMALL INTESTINE SURGERY  2012    bowel obstruction @ [facility] Dr. Amaya     TUBAL LIGATION  1991    UPPER GASTROINTESTINAL ENDOSCOPY  [id number]    DR. MAGALLON     Family History   Problem Relation Age of Onset    Diabetes Mother     Heart Disease Mother         stints    Diabetes Sister     High Blood Pressure Sister      Social History     Socioeconomic History    Marital status:      Spouse name: Not on file    Number of children: Not on file    Years of education: Not on file    Highest education level: Not on file   Occupational History    Not on file   Tobacco Use    Smoking status: Some Days     Current packs/day: 0.25     Average packs/day: 0.2 packs/day for 40.6 years (10.1 ttl pk-yrs)     Types: Cigarettes     Start date: 1984     Passive exposure: Current    Smokeless tobacco: Never    Tobacco comments:     4/day - intermittent use   Vaping Use    Vaping Use: Never used   Substance and Sexual Activity    Alcohol use: No    Drug use: No    Sexual activity: Not on file   Other Topics Concern    Not on file   Social History Narrative    Not on file     Social Determinants of Health     Financial Resource Strain: Not on file   Food Insecurity: Not on file  Admission

## 2024-08-22 ENCOUNTER — OFFICE VISIT (OUTPATIENT)
Dept: PAIN MANAGEMENT | Age: 59
End: 2024-08-22
Payer: COMMERCIAL

## 2024-08-22 VITALS
HEIGHT: 64 IN | TEMPERATURE: 97 F | DIASTOLIC BLOOD PRESSURE: 62 MMHG | WEIGHT: 138 LBS | BODY MASS INDEX: 23.56 KG/M2 | SYSTOLIC BLOOD PRESSURE: 104 MMHG

## 2024-08-22 DIAGNOSIS — G89.29 ABDOMINAL PAIN, CHRONIC, RIGHT UPPER QUADRANT: ICD-10-CM

## 2024-08-22 DIAGNOSIS — G89.29 CHRONIC RIGHT SHOULDER PAIN: Primary | ICD-10-CM

## 2024-08-22 DIAGNOSIS — M25.511 CHRONIC RIGHT SHOULDER PAIN: Primary | ICD-10-CM

## 2024-08-22 DIAGNOSIS — G89.29 CHRONIC PAIN OF LEFT KNEE: ICD-10-CM

## 2024-08-22 DIAGNOSIS — M25.562 CHRONIC PAIN OF LEFT KNEE: ICD-10-CM

## 2024-08-22 DIAGNOSIS — F11.90 CHRONIC, CONTINUOUS USE OF OPIOIDS: ICD-10-CM

## 2024-08-22 DIAGNOSIS — M46.1 SACROILIAC INFLAMMATION (HCC): ICD-10-CM

## 2024-08-22 DIAGNOSIS — M47.817 LUMBOSACRAL SPONDYLOSIS WITHOUT MYELOPATHY: ICD-10-CM

## 2024-08-22 DIAGNOSIS — R10.11 ABDOMINAL PAIN, CHRONIC, RIGHT UPPER QUADRANT: ICD-10-CM

## 2024-08-22 PROCEDURE — 99214 OFFICE O/P EST MOD 30 MIN: CPT | Performed by: NURSE PRACTITIONER

## 2024-08-22 RX ORDER — HYDROCODONE BITARTRATE AND ACETAMINOPHEN 5; 325 MG/1; MG/1
1 TABLET ORAL 3 TIMES DAILY PRN
Qty: 90 TABLET | Refills: 0 | Status: SHIPPED | OUTPATIENT
Start: 2024-08-25 | End: 2024-09-24

## 2024-08-22 ASSESSMENT — ENCOUNTER SYMPTOMS
CONSTIPATION: 0
TROUBLE SWALLOWING: 0
SHORTNESS OF BREATH: 0
ABDOMINAL PAIN: 1
COUGH: 0
DIARRHEA: 0
BACK PAIN: 1
EYES NEGATIVE: 1

## 2024-09-19 ENCOUNTER — OFFICE VISIT (OUTPATIENT)
Age: 59
End: 2024-09-19
Payer: COMMERCIAL

## 2024-09-19 VITALS
DIASTOLIC BLOOD PRESSURE: 72 MMHG | SYSTOLIC BLOOD PRESSURE: 112 MMHG | HEIGHT: 63 IN | BODY MASS INDEX: 25.16 KG/M2 | WEIGHT: 142 LBS | TEMPERATURE: 97.1 F

## 2024-09-19 DIAGNOSIS — G89.29 CHRONIC RIGHT SHOULDER PAIN: ICD-10-CM

## 2024-09-19 DIAGNOSIS — M47.817 LUMBOSACRAL SPONDYLOSIS WITHOUT MYELOPATHY: ICD-10-CM

## 2024-09-19 DIAGNOSIS — F11.90 CHRONIC, CONTINUOUS USE OF OPIOIDS: ICD-10-CM

## 2024-09-19 DIAGNOSIS — G89.29 CHRONIC PAIN OF LEFT KNEE: ICD-10-CM

## 2024-09-19 DIAGNOSIS — M25.562 CHRONIC PAIN OF LEFT KNEE: ICD-10-CM

## 2024-09-19 DIAGNOSIS — M46.1 SACROILIAC INFLAMMATION (HCC): ICD-10-CM

## 2024-09-19 DIAGNOSIS — M25.511 CHRONIC RIGHT SHOULDER PAIN: ICD-10-CM

## 2024-09-19 DIAGNOSIS — G89.29 ABDOMINAL PAIN, CHRONIC, RIGHT UPPER QUADRANT: ICD-10-CM

## 2024-09-19 DIAGNOSIS — R10.11 ABDOMINAL PAIN, CHRONIC, RIGHT UPPER QUADRANT: ICD-10-CM

## 2024-09-19 PROCEDURE — 99214 OFFICE O/P EST MOD 30 MIN: CPT | Performed by: NURSE PRACTITIONER

## 2024-09-19 PROCEDURE — 99214 OFFICE O/P EST MOD 30 MIN: CPT

## 2024-09-19 RX ORDER — HYDROCODONE BITARTRATE AND ACETAMINOPHEN 5; 325 MG/1; MG/1
1 TABLET ORAL 3 TIMES DAILY PRN
Qty: 90 TABLET | Refills: 0 | Status: SHIPPED | OUTPATIENT
Start: 2024-09-24 | End: 2024-10-24

## 2024-09-19 ASSESSMENT — ENCOUNTER SYMPTOMS
SHORTNESS OF BREATH: 0
COUGH: 0
TROUBLE SWALLOWING: 0
DIARRHEA: 0
GASTROINTESTINAL NEGATIVE: 1
EYES NEGATIVE: 1
CONSTIPATION: 0
BACK PAIN: 1

## 2024-10-17 ENCOUNTER — OFFICE VISIT (OUTPATIENT)
Age: 59
End: 2024-10-17
Payer: COMMERCIAL

## 2024-10-17 VITALS
BODY MASS INDEX: 23.73 KG/M2 | WEIGHT: 139 LBS | TEMPERATURE: 97.2 F | SYSTOLIC BLOOD PRESSURE: 122 MMHG | DIASTOLIC BLOOD PRESSURE: 78 MMHG | HEIGHT: 64 IN

## 2024-10-17 DIAGNOSIS — G89.29 CHRONIC RIGHT SHOULDER PAIN: ICD-10-CM

## 2024-10-17 DIAGNOSIS — R10.11 ABDOMINAL PAIN, CHRONIC, RIGHT UPPER QUADRANT: ICD-10-CM

## 2024-10-17 DIAGNOSIS — M47.817 LUMBOSACRAL SPONDYLOSIS WITHOUT MYELOPATHY: ICD-10-CM

## 2024-10-17 DIAGNOSIS — M25.562 CHRONIC PAIN OF LEFT KNEE: ICD-10-CM

## 2024-10-17 DIAGNOSIS — G89.29 CHRONIC PAIN OF LEFT KNEE: ICD-10-CM

## 2024-10-17 DIAGNOSIS — M25.511 CHRONIC RIGHT SHOULDER PAIN: ICD-10-CM

## 2024-10-17 DIAGNOSIS — G89.29 ABDOMINAL PAIN, CHRONIC, RIGHT UPPER QUADRANT: ICD-10-CM

## 2024-10-17 DIAGNOSIS — M46.1 SACROILIAC INFLAMMATION (HCC): ICD-10-CM

## 2024-10-17 DIAGNOSIS — F11.90 CHRONIC, CONTINUOUS USE OF OPIOIDS: ICD-10-CM

## 2024-10-17 PROCEDURE — 99214 OFFICE O/P EST MOD 30 MIN: CPT | Performed by: NURSE PRACTITIONER

## 2024-10-17 PROCEDURE — 99212 OFFICE O/P EST SF 10 MIN: CPT

## 2024-10-17 RX ORDER — HYDROCODONE BITARTRATE AND ACETAMINOPHEN 5; 325 MG/1; MG/1
1 TABLET ORAL 3 TIMES DAILY PRN
Qty: 90 TABLET | Refills: 0 | Status: SHIPPED | OUTPATIENT
Start: 2024-10-24 | End: 2024-11-23

## 2024-10-17 ASSESSMENT — ENCOUNTER SYMPTOMS
BACK PAIN: 1
DIARRHEA: 0
EYES NEGATIVE: 1
COUGH: 0
SHORTNESS OF BREATH: 0
TROUBLE SWALLOWING: 0
GASTROINTESTINAL NEGATIVE: 1
CONSTIPATION: 0

## 2024-10-17 NOTE — PROGRESS NOTES
options with the patient today. Anatomic model pathology was shown and reviewed with pt.  Continue norco 5mg TID PRN pain as it helps her function. She is not interested in injections.  All questions were answered. Discussed home exercise program.  Relevant imaging and pain generators reviewed. Pt verbalized understanding and agrees with above plan. Pt has chronic pain.     Utox reviewed and appropriate from September 2024. ORT score 0 - low risk. Narcan Rx sent in May 2021.     Will continue medications for chronic pain that has been previously directed as they do help pt function with ADL and improve quality of life. Pt is aware goal is to use the least amount of medication possible to allow pt to function and help with quality of life as discussed in detail.  Ideal to keep MME below 50.  Have discussed proper disposal of narcotic medication. Advised to have medications in safe place and locked up/in lock box.  Discussed possible risks of opiate medication with pt, including, but not limited to possible constipation, nausea or vomiting, sedation, urinary retention, dependence and possible addiction. Pt agrees to use medication as prescribed/as directed. Pt advised to not use opiates while driving or operating heavy equipment, or in situations where pt may harm him/herself or others.  Pt is aware that while on narcotics, pt needs to be seen to reassess pain and reassess need for continued medication at that time. The treatment plan will be reassessed each office visit to determine if continuing medications is appropriate and may be discontinued if no such improvement as noted in NDP.   NDP reviewed.  OARRS was reviewed.      Follow up:  Return in about 5 weeks (around 11/21/2024) for review meds and reassess pain.    Alina Clark, APRN - CNP

## 2024-11-21 ENCOUNTER — OFFICE VISIT (OUTPATIENT)
Age: 59
End: 2024-11-21
Payer: COMMERCIAL

## 2024-11-21 VITALS
SYSTOLIC BLOOD PRESSURE: 112 MMHG | HEIGHT: 64 IN | WEIGHT: 146 LBS | DIASTOLIC BLOOD PRESSURE: 70 MMHG | BODY MASS INDEX: 24.92 KG/M2 | TEMPERATURE: 98.1 F

## 2024-11-21 DIAGNOSIS — G89.29 CHRONIC PAIN OF LEFT KNEE: ICD-10-CM

## 2024-11-21 DIAGNOSIS — G89.29 ABDOMINAL PAIN, CHRONIC, RIGHT UPPER QUADRANT: ICD-10-CM

## 2024-11-21 DIAGNOSIS — M47.817 LUMBOSACRAL SPONDYLOSIS WITHOUT MYELOPATHY: ICD-10-CM

## 2024-11-21 DIAGNOSIS — R10.11 ABDOMINAL PAIN, CHRONIC, RIGHT UPPER QUADRANT: ICD-10-CM

## 2024-11-21 DIAGNOSIS — M25.562 CHRONIC PAIN OF LEFT KNEE: ICD-10-CM

## 2024-11-21 DIAGNOSIS — F11.90 CHRONIC, CONTINUOUS USE OF OPIOIDS: ICD-10-CM

## 2024-11-21 DIAGNOSIS — M46.1 SACROILIAC INFLAMMATION (HCC): ICD-10-CM

## 2024-11-21 DIAGNOSIS — G89.29 CHRONIC RIGHT SHOULDER PAIN: ICD-10-CM

## 2024-11-21 DIAGNOSIS — M25.511 CHRONIC RIGHT SHOULDER PAIN: ICD-10-CM

## 2024-11-21 PROCEDURE — 99214 OFFICE O/P EST MOD 30 MIN: CPT | Performed by: NURSE PRACTITIONER

## 2024-11-21 PROCEDURE — 99212 OFFICE O/P EST SF 10 MIN: CPT | Performed by: NURSE PRACTITIONER

## 2024-11-21 RX ORDER — HYDROCODONE BITARTRATE AND ACETAMINOPHEN 5; 325 MG/1; MG/1
1 TABLET ORAL 3 TIMES DAILY PRN
Qty: 90 TABLET | Refills: 0 | Status: SHIPPED | OUTPATIENT
Start: 2024-11-23 | End: 2024-12-23

## 2024-11-21 ASSESSMENT — ENCOUNTER SYMPTOMS
COUGH: 0
SHORTNESS OF BREATH: 0
TROUBLE SWALLOWING: 0
EYES NEGATIVE: 1
DIARRHEA: 0
GASTROINTESTINAL NEGATIVE: 1
BACK PAIN: 1
CONSTIPATION: 0

## 2024-11-21 NOTE — PROGRESS NOTES
Jaimie RAGSDALE Monday  (1965)    11/21/2024    Subjective:     Jaimie RAGSDALE Monday is 59 y.o. female who complains today of:    Chief Complaint   Patient presents with    Follow-up    Shoulder Pain     bilateral         Allergies:  Dye [iodides], Macrobid [nitrofurantoin monohydrate macrocrystals], and Flagyl [metronidazole]    Past Medical History:   Diagnosis Date    Anxiety     GERD (gastroesophageal reflux disease)     History of cellulitis and abscess     Irritable bowel syndrome      Past Surgical History:   Procedure Laterality Date    HERNIA REPAIR  1992, 2012    HYSTERECTOMY (CERVIX STATUS UNKNOWN)  2009    OTHER SURGICAL HISTORY  02/11/15    DR AMAYA  EXCISION OF LT LOWER QUAD NONHEALING SKIN LESION    SMALL INTESTINE SURGERY  2012    bowel obstruction @ [facility] Dr. Amaya     TUBAL LIGATION  1991    UPPER GASTROINTESTINAL ENDOSCOPY  [id number]    DR. MAGALLON     Family History   Problem Relation Age of Onset    Diabetes Mother     Heart Disease Mother         stints    Diabetes Sister     High Blood Pressure Sister      Social History     Socioeconomic History    Marital status:      Spouse name: Not on file    Number of children: Not on file    Years of education: Not on file    Highest education level: Not on file   Occupational History    Not on file   Tobacco Use    Smoking status: Some Days     Current packs/day: 0.25     Average packs/day: 0.2 packs/day for 40.9 years (10.2 ttl pk-yrs)     Types: Cigarettes     Start date: 1984     Passive exposure: Current    Smokeless tobacco: Never    Tobacco comments:     4/day - intermittent use   Vaping Use    Vaping status: Never Used   Substance and Sexual Activity    Alcohol use: No    Drug use: No    Sexual activity: Not on file   Other Topics Concern    Not on file   Social History Narrative    Not on file     Social Determinants of Health     Financial Resource Strain: Not on file   Food Insecurity: Not on file   Transportation Needs: Not on file

## 2024-12-19 ENCOUNTER — OFFICE VISIT (OUTPATIENT)
Age: 59
End: 2024-12-19
Payer: COMMERCIAL

## 2024-12-19 VITALS — TEMPERATURE: 97.7 F | HEIGHT: 61 IN | BODY MASS INDEX: 26.24 KG/M2 | WEIGHT: 139 LBS

## 2024-12-19 DIAGNOSIS — R10.11 ABDOMINAL PAIN, CHRONIC, RIGHT UPPER QUADRANT: ICD-10-CM

## 2024-12-19 DIAGNOSIS — M47.817 LUMBOSACRAL SPONDYLOSIS WITHOUT MYELOPATHY: ICD-10-CM

## 2024-12-19 DIAGNOSIS — G89.29 ABDOMINAL PAIN, CHRONIC, RIGHT UPPER QUADRANT: ICD-10-CM

## 2024-12-19 DIAGNOSIS — M25.511 CHRONIC RIGHT SHOULDER PAIN: ICD-10-CM

## 2024-12-19 DIAGNOSIS — M46.1 SACROILIAC INFLAMMATION (HCC): ICD-10-CM

## 2024-12-19 DIAGNOSIS — G89.29 CHRONIC PAIN OF LEFT KNEE: ICD-10-CM

## 2024-12-19 DIAGNOSIS — G89.29 CHRONIC RIGHT SHOULDER PAIN: ICD-10-CM

## 2024-12-19 DIAGNOSIS — M25.562 CHRONIC PAIN OF LEFT KNEE: ICD-10-CM

## 2024-12-19 DIAGNOSIS — F11.90 CHRONIC, CONTINUOUS USE OF OPIOIDS: ICD-10-CM

## 2024-12-19 PROCEDURE — 99214 OFFICE O/P EST MOD 30 MIN: CPT | Performed by: NURSE PRACTITIONER

## 2024-12-19 RX ORDER — HYDROCODONE BITARTRATE AND ACETAMINOPHEN 5; 325 MG/1; MG/1
1 TABLET ORAL 3 TIMES DAILY PRN
Qty: 90 TABLET | Refills: 0 | Status: SHIPPED | OUTPATIENT
Start: 2024-12-23 | End: 2025-01-22

## 2024-12-19 ASSESSMENT — ENCOUNTER SYMPTOMS
GASTROINTESTINAL NEGATIVE: 1
TROUBLE SWALLOWING: 0
DIARRHEA: 0
EYES NEGATIVE: 1
CONSTIPATION: 0
SHORTNESS OF BREATH: 0
COUGH: 0
BACK PAIN: 1

## 2024-12-19 NOTE — PROGRESS NOTES
Jaimie RAGSDALE Monday  (1965)    12/19/2024    Subjective:     Jaimie RAGSDALE Monday is 59 y.o. female who complains today of:    Chief Complaint   Patient presents with    Shoulder Pain     Right         Allergies:  Dye [iodides], Macrobid [nitrofurantoin monohydrate macrocrystals], and Flagyl [metronidazole]    Past Medical History:   Diagnosis Date    Anxiety     GERD (gastroesophageal reflux disease)     History of cellulitis and abscess     Irritable bowel syndrome      Past Surgical History:   Procedure Laterality Date    HERNIA REPAIR  1992, 2012    HYSTERECTOMY (CERVIX STATUS UNKNOWN)  2009    OTHER SURGICAL HISTORY  02/11/15    DR AMAYA  EXCISION OF LT LOWER QUAD NONHEALING SKIN LESION    SMALL INTESTINE SURGERY  2012    bowel obstruction @ [facility] Dr. Amaya     TUBAL LIGATION  1991    UPPER GASTROINTESTINAL ENDOSCOPY  [id number]    DR. MAGALLON     Family History   Problem Relation Age of Onset    Diabetes Mother     Heart Disease Mother         stints    Diabetes Sister     High Blood Pressure Sister      Social History     Socioeconomic History    Marital status:      Spouse name: Not on file    Number of children: Not on file    Years of education: Not on file    Highest education level: Not on file   Occupational History    Not on file   Tobacco Use    Smoking status: Some Days     Current packs/day: 0.25     Average packs/day: 0.2 packs/day for 41.0 years (10.2 ttl pk-yrs)     Types: Cigarettes     Start date: 1984     Passive exposure: Current    Smokeless tobacco: Never    Tobacco comments:     4/day - intermittent use   Vaping Use    Vaping status: Never Used   Substance and Sexual Activity    Alcohol use: No    Drug use: No    Sexual activity: Not on file   Other Topics Concern    Not on file   Social History Narrative    Not on file     Social Determinants of Health     Financial Resource Strain: Not on file   Food Insecurity: Not on file   Transportation Needs: Not on file   Physical

## 2025-01-16 ENCOUNTER — OFFICE VISIT (OUTPATIENT)
Age: 60
End: 2025-01-16
Payer: COMMERCIAL

## 2025-01-16 VITALS — BODY MASS INDEX: 25.86 KG/M2 | TEMPERATURE: 97.3 F | WEIGHT: 137 LBS | HEIGHT: 61 IN

## 2025-01-16 DIAGNOSIS — R10.11 ABDOMINAL PAIN, CHRONIC, RIGHT UPPER QUADRANT: ICD-10-CM

## 2025-01-16 DIAGNOSIS — M25.511 CHRONIC RIGHT SHOULDER PAIN: ICD-10-CM

## 2025-01-16 DIAGNOSIS — G89.29 ABDOMINAL PAIN, CHRONIC, RIGHT UPPER QUADRANT: ICD-10-CM

## 2025-01-16 DIAGNOSIS — F11.90 CHRONIC, CONTINUOUS USE OF OPIOIDS: ICD-10-CM

## 2025-01-16 DIAGNOSIS — M46.1 SACROILIAC INFLAMMATION (HCC): ICD-10-CM

## 2025-01-16 DIAGNOSIS — M25.562 CHRONIC PAIN OF LEFT KNEE: ICD-10-CM

## 2025-01-16 DIAGNOSIS — M47.817 LUMBOSACRAL SPONDYLOSIS WITHOUT MYELOPATHY: ICD-10-CM

## 2025-01-16 DIAGNOSIS — G89.29 CHRONIC RIGHT SHOULDER PAIN: ICD-10-CM

## 2025-01-16 DIAGNOSIS — G89.29 CHRONIC PAIN OF LEFT KNEE: ICD-10-CM

## 2025-01-16 PROCEDURE — 99213 OFFICE O/P EST LOW 20 MIN: CPT | Performed by: NURSE PRACTITIONER

## 2025-01-16 PROCEDURE — 99214 OFFICE O/P EST MOD 30 MIN: CPT | Performed by: NURSE PRACTITIONER

## 2025-01-16 RX ORDER — HYDROCODONE BITARTRATE AND ACETAMINOPHEN 5; 325 MG/1; MG/1
1 TABLET ORAL 3 TIMES DAILY PRN
Qty: 90 TABLET | Refills: 0 | Status: SHIPPED | OUTPATIENT
Start: 2025-01-22 | End: 2025-02-21

## 2025-01-16 ASSESSMENT — ENCOUNTER SYMPTOMS
CONSTIPATION: 0
DIARRHEA: 0
EYES NEGATIVE: 1
COUGH: 0
TROUBLE SWALLOWING: 0
ABDOMINAL PAIN: 1
BACK PAIN: 1
SHORTNESS OF BREATH: 0

## 2025-01-16 NOTE — PROGRESS NOTES
of Rt shoulder.  TTP over anterior Rt shoulder and ROM causes discomfort.   Strong grasp bilaterally. Large yellow healing bruise on front of Rt shin and TTP mild swelling.  Bruise on Lt forearm. Full ROM knee and Rt foot.  Cranial nerves II-XII are intact.             Assessment:      Diagnosis Orders   1. Chronic right shoulder pain  HYDROcodone-acetaminophen (NORCO) 5-325 MG per tablet      2. Abdominal pain, chronic, right upper quadrant  HYDROcodone-acetaminophen (NORCO) 5-325 MG per tablet      3. Lumbosacral spondylosis without myelopathy  HYDROcodone-acetaminophen (NORCO) 5-325 MG per tablet      4. Chronic, continuous use of opioids  HYDROcodone-acetaminophen (NORCO) 5-325 MG per tablet      5. Chronic pain of left knee  HYDROcodone-acetaminophen (NORCO) 5-325 MG per tablet      6. Sacroiliac inflammation (HCC)  HYDROcodone-acetaminophen (NORCO) 5-325 MG per tablet          Plan:     Periodic Controlled Substance Monitoring: Possible medication side effects, risk of tolerance/dependence & alternative treatments discussed., No signs of potential drug abuse or diversion identified., Assessed functional status (ability to engage in work or other purposeful activities, the pain intensity and its interference with activities of daily living, quality of family life and social activities, and the physical activity), Obtaining appropriate analgesic effect of treatment. (Alina Davis, APRN - CNP)    Orders Placed This Encounter   Medications    HYDROcodone-acetaminophen (NORCO) 5-325 MG per tablet     Sig: Take 1 tablet by mouth 3 times daily as needed for Pain for up to 30 days. Do not fill until 1/22/25 for 30 days Max Daily Amount: 3 tablets     Dispense:  90 tablet     Refill:  0     Reduce doses taken as pain becomes manageable       No orders of the defined types were placed in this encounter.    Discussed options with the patient today. Anatomic model pathology was shown and reviewed with pt.  Encouraged

## 2025-02-19 ENCOUNTER — OFFICE VISIT (OUTPATIENT)
Dept: PRIMARY CARE | Facility: CLINIC | Age: 60
End: 2025-02-19
Payer: COMMERCIAL

## 2025-02-19 VITALS
TEMPERATURE: 97.7 F | OXYGEN SATURATION: 96 % | BODY MASS INDEX: 27.75 KG/M2 | DIASTOLIC BLOOD PRESSURE: 80 MMHG | HEIGHT: 62 IN | WEIGHT: 150.8 LBS | SYSTOLIC BLOOD PRESSURE: 124 MMHG | RESPIRATION RATE: 16 BRPM | HEART RATE: 73 BPM

## 2025-02-19 DIAGNOSIS — J00 NASOPHARYNGITIS: ICD-10-CM

## 2025-02-19 DIAGNOSIS — J34.89 NASAL CONGESTION WITH RHINORRHEA: ICD-10-CM

## 2025-02-19 DIAGNOSIS — R09.81 NASAL CONGESTION WITH RHINORRHEA: ICD-10-CM

## 2025-02-19 DIAGNOSIS — J01.40 ACUTE NON-RECURRENT PANSINUSITIS: Primary | ICD-10-CM

## 2025-02-19 PROCEDURE — 99212 OFFICE O/P EST SF 10 MIN: CPT | Performed by: NURSE PRACTITIONER

## 2025-02-19 RX ORDER — ESOMEPRAZOLE MAGNESIUM 40 MG/1
40 CAPSULE, DELAYED RELEASE ORAL
COMMUNITY
Start: 2015-06-08

## 2025-02-19 RX ORDER — AMOXICILLIN 875 MG/1
875 TABLET, FILM COATED ORAL 2 TIMES DAILY
Qty: 20 TABLET | Refills: 0 | Status: SHIPPED | OUTPATIENT
Start: 2025-02-19 | End: 2025-03-01

## 2025-02-19 ASSESSMENT — PATIENT HEALTH QUESTIONNAIRE - PHQ9
2. FEELING DOWN, DEPRESSED OR HOPELESS: NOT AT ALL
SUM OF ALL RESPONSES TO PHQ9 QUESTIONS 1 AND 2: 0
1. LITTLE INTEREST OR PLEASURE IN DOING THINGS: NOT AT ALL
SUM OF ALL RESPONSES TO PHQ9 QUESTIONS 1 AND 2: 0
1. LITTLE INTEREST OR PLEASURE IN DOING THINGS: NOT AT ALL
2. FEELING DOWN, DEPRESSED OR HOPELESS: NOT AT ALL

## 2025-02-19 ASSESSMENT — ENCOUNTER SYMPTOMS
OCCASIONAL FEELINGS OF UNSTEADINESS: 0
LOSS OF SENSATION IN FEET: 0
DEPRESSION: 0

## 2025-02-19 ASSESSMENT — PAIN SCALES - GENERAL: PAINLEVEL_OUTOF10: 2

## 2025-02-19 NOTE — PROGRESS NOTES
Subjective   Patient ID: Apple ROMERO Monday is a 59 y.o. female who is with a chief complaint of symptoms of respiratory tract infection.     HPI   Patient is a 59 y.o. female who CONSULTED AT [facility] today. Patient is with complaints of nasal congestion, nasal discharge, headache, sinus pain, sore throat, post nasal drip, cough, and fatigue. She denies having any muscle ache, loss of sense of taste, loss of sense of smell, diarrhea, chills nor fever. Patient states that present condition started about 4 days ago after being exposed to her granddaughter who is having similar symptoms. she denies shortness of breath, chest pain, palpitations, nor edema. she stated that she took left over amoxicillin which afforded only slight relief of symptoms. she denies nausea, vomiting, abdominal pain, nor any other symptoms.    Patient states she had not received any COVID vaccine yet.  Patient states she have not yet received flu shot for this season.    Review of Systems  General: no weight loss, generally healthy, (+) fatigue  Head: (+) headache, (+) sinus pain, no vertigo, no injury  Eyes: no diplopia, no tearing, no pain,   Ears: no change in hearing, no tinnitus, no bleeding, no vertigo  Mouth:  no dental difficulties, no gingival bleeding, (+) sore throat, no loss of sense of taste, (+) post nasal drip,   Nose: (+) congestion, (+) discharge, no bleeding, no obstruction, no loss of sense of smell  Neck: no stiffness, no pain, no tenderness, no masses, no bruit  Pulmonary: no dyspnea, no wheezing, no hemoptysis, (+) cough  Cardiovascular: no chest pain, no palpitations, no syncope, no orthopnea  Gastrointestinal: no change in appetite, no dysphagia, no abdominal pains, no diarrhea, no emesis, no melena  Genito Urinary: no dysuria, no urinary urgency, no nocturia, no incontinence, no change in nature of urine  Musculoskeletal: no muscle ache, no joint pain, no limitation of range of motion, no  paresthesia, no numbness  Constitutional: no fever, no chills, no night sweats    Objective   Physical Exam  General: ambulatory, in no acute distress  Head: normocephalic, no lesions, (+) sinus tenderness  Eyes: pink palpebral conjunctiva, anicteric sclerae, PERRLA, EOM's full  Ears: clear external auditory canals, no ear discharge, no bleeding from the ears, tympanic membrane intact  Nose: (+) congested nasal mucosa, (+) yellow mucoid nasal discharge, no bleeding, no obstruction  Throat: (+) erythema, and (+) exudate on posterior pharyngeal wall, no lesion  Neck: supple, no masses, no bruits, no CLADP  Chest: symmetrical chest expansion, no lagging, no retractions, clear breath sounds, no rales, no wheezes    Assessment/Plan   Problem List Items Addressed This Visit    None  Visit Diagnoses         Codes    Acute non-recurrent pansinusitis    -  Primary J01.40    Relevant Medications    amoxicillin (Amoxil) 875 mg tablet    Nasopharyngitis     J00    Relevant Medications    amoxicillin (Amoxil) 875 mg tablet    Nasal congestion with rhinorrhea     R09.81, J34.89    Relevant Medications    amoxicillin (Amoxil) 875 mg tablet        DISCHARGE SUMMARY:   Patient was seen and examined. Diagnosis, treatment, treatment options, and possible complications of today's illness discussed and explained to patient. Patient to take medication/s associated with this visit. Patient may take OTC decongestant of choice as needed. Patient may also take OTC analgesic/ antipyretic if needed for pain/fever. Advised to increase oral fluid intake. Advised steam inhalation if needed to relieve congestion. Advised warm saline gargle if needed to relieve throat discomfort. Advised Listerine antiseptic mouthwash gargle TID. Patient may use Cepacol oral spray as needed to relieve throat discomfort. Patient was advised to discard the old toothbrush and use a new toothbrush beginning on the third of antibiotics. Advised to come back if with  worsening or persistent symptoms. Patient verbalized understanding of plan of care.    Patient to come back in 7 - 10 days if needed for worsening symptoms.

## 2025-02-19 NOTE — PROGRESS NOTES
"Subjective   Patient ID: Apple ROMERO Monday is a 59 y.o. female who presents for sinus       Symptoms: sinus pressure, headaches, green sputum, ear pain, sore throat.  Length of symptoms: 4 days ago  OTC:  tylenol with mild help  Related information:   HPI         Review of Systems    Objective   /83 Comment: auto  Pulse 73   Temp 36.5 °C (97.7 °F)   Resp 16   Ht 1.575 m (5' 2\")   Wt 68.4 kg (150 lb 12.8 oz)   SpO2 96%   BMI 27.58 kg/m²     Physical Exam    Assessment/Plan          "

## 2025-02-20 ENCOUNTER — OFFICE VISIT (OUTPATIENT)
Age: 60
End: 2025-02-20
Payer: COMMERCIAL

## 2025-02-20 VITALS
SYSTOLIC BLOOD PRESSURE: 110 MMHG | WEIGHT: 146 LBS | DIASTOLIC BLOOD PRESSURE: 60 MMHG | HEIGHT: 62 IN | BODY MASS INDEX: 26.87 KG/M2 | TEMPERATURE: 97.2 F

## 2025-02-20 DIAGNOSIS — M25.511 CHRONIC RIGHT SHOULDER PAIN: ICD-10-CM

## 2025-02-20 DIAGNOSIS — F11.90 CHRONIC, CONTINUOUS USE OF OPIOIDS: ICD-10-CM

## 2025-02-20 DIAGNOSIS — M46.1 SACROILIAC INFLAMMATION: ICD-10-CM

## 2025-02-20 DIAGNOSIS — G89.29 CHRONIC RIGHT SHOULDER PAIN: ICD-10-CM

## 2025-02-20 DIAGNOSIS — R10.11 ABDOMINAL PAIN, CHRONIC, RIGHT UPPER QUADRANT: ICD-10-CM

## 2025-02-20 DIAGNOSIS — G89.29 ABDOMINAL PAIN, CHRONIC, RIGHT UPPER QUADRANT: ICD-10-CM

## 2025-02-20 DIAGNOSIS — M47.817 LUMBOSACRAL SPONDYLOSIS WITHOUT MYELOPATHY: ICD-10-CM

## 2025-02-20 DIAGNOSIS — G89.29 CHRONIC PAIN OF LEFT KNEE: ICD-10-CM

## 2025-02-20 DIAGNOSIS — M25.562 CHRONIC PAIN OF LEFT KNEE: ICD-10-CM

## 2025-02-20 PROCEDURE — 99214 OFFICE O/P EST MOD 30 MIN: CPT | Performed by: NURSE PRACTITIONER

## 2025-02-20 PROCEDURE — 99213 OFFICE O/P EST LOW 20 MIN: CPT | Performed by: NURSE PRACTITIONER

## 2025-02-20 RX ORDER — AMOXICILLIN 875 MG/1
875 TABLET, COATED ORAL
COMMUNITY
Start: 2025-02-19

## 2025-02-20 RX ORDER — HYDROCODONE BITARTRATE AND ACETAMINOPHEN 5; 325 MG/1; MG/1
1 TABLET ORAL 3 TIMES DAILY PRN
Qty: 90 TABLET | Refills: 0 | Status: SHIPPED | OUTPATIENT
Start: 2025-02-21 | End: 2025-03-23

## 2025-02-20 ASSESSMENT — ENCOUNTER SYMPTOMS
COUGH: 0
RHINORRHEA: 1
SINUS PAIN: 1
CONSTIPATION: 0
GASTROINTESTINAL NEGATIVE: 1
BACK PAIN: 1
TROUBLE SWALLOWING: 0
SHORTNESS OF BREATH: 0
DIARRHEA: 0
SINUS PRESSURE: 1
EYES NEGATIVE: 1

## 2025-02-20 NOTE — PROGRESS NOTES
both hamstrings noted.  Balance and coordination normal.  Strength is functional for ambulation. Full  ROM neck. Decreased ROM of Rt shoulder.  TTP over anterior Rt shoulder and ROM causes discomfort.   Strong grasp bilaterally.  Full ROM knee and Rt foot.  Cranial nerves II-XII are intact.             Assessment:      Diagnosis Orders   1. Chronic right shoulder pain  HYDROcodone-acetaminophen (NORCO) 5-325 MG per tablet      2. Abdominal pain, chronic, right upper quadrant  HYDROcodone-acetaminophen (NORCO) 5-325 MG per tablet      3. Lumbosacral spondylosis without myelopathy  HYDROcodone-acetaminophen (NORCO) 5-325 MG per tablet      4. Chronic, continuous use of opioids  HYDROcodone-acetaminophen (NORCO) 5-325 MG per tablet      5. Chronic pain of left knee  HYDROcodone-acetaminophen (NORCO) 5-325 MG per tablet      6. Sacroiliac inflammation  HYDROcodone-acetaminophen (NORCO) 5-325 MG per tablet          Plan:     Periodic Controlled Substance Monitoring: Possible medication side effects, risk of tolerance/dependence & alternative treatments discussed., No signs of potential drug abuse or diversion identified., Assessed functional status (ability to engage in work or other purposeful activities, the pain intensity and its interference with activities of daily living, quality of family life and social activities, and the physical activity), Obtaining appropriate analgesic effect of treatment. (Alina Davis, APRN - CNP)    Orders Placed This Encounter   Medications    HYDROcodone-acetaminophen (NORCO) 5-325 MG per tablet     Sig: Take 1 tablet by mouth 3 times daily as needed for Pain for up to 30 days. Do not fill until 2/21/25 for 30 days Max Daily Amount: 3 tablets     Dispense:  90 tablet     Refill:  0     Reduce doses taken as pain becomes manageable       No orders of the defined types were placed in this encounter.    Discussed options with the patient today. Anatomic model pathology was shown and

## 2025-03-20 ENCOUNTER — OFFICE VISIT (OUTPATIENT)
Age: 60
End: 2025-03-20
Payer: COMMERCIAL

## 2025-03-20 VITALS
DIASTOLIC BLOOD PRESSURE: 78 MMHG | SYSTOLIC BLOOD PRESSURE: 118 MMHG | HEIGHT: 62 IN | BODY MASS INDEX: 26.87 KG/M2 | WEIGHT: 146 LBS | TEMPERATURE: 97.1 F

## 2025-03-20 DIAGNOSIS — M25.562 CHRONIC PAIN OF LEFT KNEE: ICD-10-CM

## 2025-03-20 DIAGNOSIS — G89.29 CHRONIC PAIN OF LEFT KNEE: ICD-10-CM

## 2025-03-20 DIAGNOSIS — G89.29 CHRONIC RIGHT SHOULDER PAIN: ICD-10-CM

## 2025-03-20 DIAGNOSIS — M25.511 CHRONIC RIGHT SHOULDER PAIN: ICD-10-CM

## 2025-03-20 DIAGNOSIS — M47.817 LUMBOSACRAL SPONDYLOSIS WITHOUT MYELOPATHY: ICD-10-CM

## 2025-03-20 DIAGNOSIS — G89.29 ABDOMINAL PAIN, CHRONIC, RIGHT UPPER QUADRANT: ICD-10-CM

## 2025-03-20 DIAGNOSIS — F11.90 CHRONIC, CONTINUOUS USE OF OPIOIDS: ICD-10-CM

## 2025-03-20 DIAGNOSIS — R10.11 ABDOMINAL PAIN, CHRONIC, RIGHT UPPER QUADRANT: ICD-10-CM

## 2025-03-20 PROCEDURE — 99212 OFFICE O/P EST SF 10 MIN: CPT | Performed by: NURSE PRACTITIONER

## 2025-03-20 PROCEDURE — 99214 OFFICE O/P EST MOD 30 MIN: CPT | Performed by: NURSE PRACTITIONER

## 2025-03-20 RX ORDER — HYDROCODONE BITARTRATE AND ACETAMINOPHEN 5; 325 MG/1; MG/1
1 TABLET ORAL 3 TIMES DAILY PRN
Qty: 90 TABLET | Refills: 0 | Status: SHIPPED | OUTPATIENT
Start: 2025-03-23 | End: 2025-04-22

## 2025-03-20 ASSESSMENT — ENCOUNTER SYMPTOMS
CONSTIPATION: 0
ABDOMINAL PAIN: 1
TROUBLE SWALLOWING: 0
SHORTNESS OF BREATH: 0
DIARRHEA: 0
BACK PAIN: 1
EYES NEGATIVE: 1
COUGH: 0

## 2025-03-20 NOTE — PROGRESS NOTES
degenerative changes right hip, no fracture.  MRI lumbar spine 8/6/19: No fracture.  T12-L1, L1 L2, L2 L3, L3 L4, L4-L5 normal canal and foramen.  L5-S1 mild canal stenosis, normal foramen.  XR R Hip 12/26/18: hip joint spaces maintained. No fracture.  EMG B LE 1/10/19: This study is limited, but normal. Although limited, there is no clear electrodiagnostic evidence for a generalized large fiber sensorimotor peripheral polyneuropathy or active lumbosacral motor radiculopathy.  XR LS Spine 6/1/18: degenerative disc disease, lumbar facet arthropathy, no fracture.  CT Abd Pelvis 4/14/18: stable right lower lung nodule, unchanged May 16. Hysterectomy. Remote RLQ and Left anterior abd wall surgical procedures.  CT Abd Pelvis 5/23/16: lung nodules. Hepatic steatosis. Gallbladder sludge. Right kidney calcification. Non obstructing right renal calculi     Opioid risk tool score 0, low risk    Objective:     Vitals:  /78   Temp 97.1 °F (36.2 °C)   Ht 1.575 m (5' 2\")   Wt 66.2 kg (146 lb)   LMP 01/01/2009   BMI 26.70 kg/m² Pain Score:   7      Physical Exam  Vitals and nursing note reviewed.     This is a pleasant female who answers questions appropriately and follows commands.  Pt is alert and oriented x 3.   Seen holding waistband of pants away from stomach for comfort today. .  Recent and remote memory is intact.  Mood and affect, judgement and insight are normal.  No signs of distress, no dyspnea or SOB noted. HEENT: PERRL.  Neck is supple, trachea midline.  No lymphadenopathy noted.   Strong grasp bilaterally.  Negative SLR.   Surgical scars noted. Decreased ROM with flexion and extension of low back. Mild TTP to lumbar spine with palpation.   Tightness in both hamstrings noted.  Balance and coordination normal.  Strength is functional for ambulation. Full  ROM neck. Decreased ROM of Rt shoulder.  TTP over anterior Rt shoulder and ROM causes discomfort.   Strong grasp bilaterally.   Abdomen soft and mildly

## 2025-04-11 NOTE — PROGRESS NOTES
Subjective   Patient ID: Apple ROMERO Monday is a 59 y.o. female who presents for Sore Throat and Choking.  HPI    Patient presents in office for a sore throat and trouble swallowing. Ongoing x 3 weeks ago. Patient states she is having trouble swallowing her food every time she eats. Patient has been eating applesauce and soft foods to avoid anything getting stuck in the middle of her chest. She tries to keep swallowing but notes that the food do not go down. She tolerates liquid without any issues. Patient has elevated stress and anxiety due to being afraid of choking and ends up starving. She admits to getting heartburn, at the back of her back, and thought she had a heart attack.     Patient reports losing weight and attributes it to starving after she underwent tooth extraction.     Medication list was reviewed with the patient. She is taking Vitamin D 50,000 units once a week. She has switched from pantoprazole to esomeprazole and notes this works better. She takes Norco as needed for abdominal pain.     She quit smoking almost a month ago.      Review of systems  ; Patient seen today for exam denies any problems with headaches or vision, denies any shortness of breath chest pain nausea or vomiting, no black stool no blood in the stool no heartburn type symptoms denies any problems with constipation or diarrhea, and no dysuria-type symptoms    The patient's allergies medications were reviewed with them today    The patient's social family and surgical history or also reviewed here today, along with her past medical history.     Objective     Alert and active in  no acute distress  HEENT TMs clear oropharynx negative nares clear no drainage noted neck supple  With no adenopathy   Heart regular rate and rhythm without murmur and no carotid bruits  Lungs- clear to auscultation bilaterally, no wheeze or rhonchi noted  Thyroid -negative masses or nodularity  Abdomen- soft times four quadrants , bowel sounds positive  no masses or organomegaly, negative tenderness guarding or rebound    skin -no lesions noted      /80 (BP Location: Left arm, Patient Position: Sitting, BP Cuff Size: Adult)   Temp 36.6 °C (97.8 °F) (Temporal)     Allergies   Allergen Reactions    Nitrofurantoin Monohyd/M-Cryst Hives    Metronidazole Nausea And Vomiting       Assessment/Plan   Problem List Items Addressed This Visit    None  Visit Diagnoses         Dysphagia, unspecified type    -  Primary    Relevant Medications    esomeprazole (NexIUM) 40 mg DR capsule    Other Relevant Orders    Referral to Gastroenterology      Sore throat          Weight loss        Relevant Orders    Tsh With Reflex To Free T4 If Abnormal      Routine general medical examination at a health care facility        Relevant Orders    Comprehensive Metabolic Panel    CBC and Auto Differential    Lipid Panel      GERD with stricture                Congratulated the patient on smoking cessation.    Patient stopped pantoprazole due to having no improvement of her dysphagia.   Resume Nexium 40 mg once daily as it was more effective for her symptoms. Refilled today.   Will start on Voquezna if having no improvement with Nexium.  Referral to gastroenterology ordered today for consideration of colonoscopy.  As she needs an EGD as she is an old smoker recently stopped and also to rule out any type of labs has not had a colon screening also    Labs have been ordered, she/he will have these performed and we will contact her/him with results.  (CBC, CMP, Lipid, TSH)    If anything worsens or changes please call us at once, follow up in the office as planned,       Scribe Attestation  By signing my name below, INa Scribe   attest that this documentation has been prepared under the direction and in the presence of Ramiro Hooks DO.    All medical record entries made by the Scribe were at my direction and personally dictated by me.   I have reviewed the chart and agree  that the record accurately reflects my personal performance of the history, physical exam, discussion, and plan.

## 2025-04-16 ENCOUNTER — APPOINTMENT (OUTPATIENT)
Dept: PRIMARY CARE | Facility: CLINIC | Age: 60
End: 2025-04-16
Payer: COMMERCIAL

## 2025-04-16 VITALS — SYSTOLIC BLOOD PRESSURE: 126 MMHG | TEMPERATURE: 97.8 F | DIASTOLIC BLOOD PRESSURE: 80 MMHG

## 2025-04-16 DIAGNOSIS — K22.2 GERD WITH STRICTURE: ICD-10-CM

## 2025-04-16 DIAGNOSIS — K21.9 GERD WITH STRICTURE: ICD-10-CM

## 2025-04-16 DIAGNOSIS — J02.9 SORE THROAT: ICD-10-CM

## 2025-04-16 DIAGNOSIS — R13.10 DYSPHAGIA, UNSPECIFIED TYPE: Primary | ICD-10-CM

## 2025-04-16 DIAGNOSIS — R63.4 WEIGHT LOSS: ICD-10-CM

## 2025-04-16 DIAGNOSIS — Z00.00 ROUTINE GENERAL MEDICAL EXAMINATION AT A HEALTH CARE FACILITY: ICD-10-CM

## 2025-04-16 PROCEDURE — 99214 OFFICE O/P EST MOD 30 MIN: CPT | Performed by: FAMILY MEDICINE

## 2025-04-16 RX ORDER — HYDROCODONE BITARTRATE AND ACETAMINOPHEN 5; 300 MG/1; MG/1
1 TABLET ORAL EVERY 6 HOURS PRN
COMMUNITY

## 2025-04-16 RX ORDER — ESOMEPRAZOLE MAGNESIUM 40 MG/1
40 CAPSULE, DELAYED RELEASE ORAL
Qty: 30 CAPSULE | Refills: 2 | Status: SHIPPED | OUTPATIENT
Start: 2025-04-16

## 2025-04-16 ASSESSMENT — PATIENT HEALTH QUESTIONNAIRE - PHQ9
SUM OF ALL RESPONSES TO PHQ9 QUESTIONS 1 AND 2: 0
2. FEELING DOWN, DEPRESSED OR HOPELESS: NOT AT ALL
1. LITTLE INTEREST OR PLEASURE IN DOING THINGS: NOT AT ALL

## 2025-04-21 ENCOUNTER — OFFICE VISIT (OUTPATIENT)
Age: 60
End: 2025-04-21
Payer: COMMERCIAL

## 2025-04-21 VITALS
TEMPERATURE: 97 F | OXYGEN SATURATION: 97 % | SYSTOLIC BLOOD PRESSURE: 120 MMHG | BODY MASS INDEX: 25.58 KG/M2 | DIASTOLIC BLOOD PRESSURE: 72 MMHG | HEART RATE: 61 BPM | HEIGHT: 62 IN | WEIGHT: 139 LBS

## 2025-04-21 DIAGNOSIS — M25.562 CHRONIC PAIN OF LEFT KNEE: ICD-10-CM

## 2025-04-21 DIAGNOSIS — G89.29 CHRONIC RIGHT SHOULDER PAIN: ICD-10-CM

## 2025-04-21 DIAGNOSIS — G89.29 CHRONIC PAIN OF LEFT KNEE: ICD-10-CM

## 2025-04-21 DIAGNOSIS — M47.817 LUMBOSACRAL SPONDYLOSIS WITHOUT MYELOPATHY: ICD-10-CM

## 2025-04-21 DIAGNOSIS — M25.511 CHRONIC RIGHT SHOULDER PAIN: ICD-10-CM

## 2025-04-21 DIAGNOSIS — F11.90 CHRONIC, CONTINUOUS USE OF OPIOIDS: ICD-10-CM

## 2025-04-21 DIAGNOSIS — R10.11 ABDOMINAL PAIN, CHRONIC, RIGHT UPPER QUADRANT: ICD-10-CM

## 2025-04-21 DIAGNOSIS — G89.29 ABDOMINAL PAIN, CHRONIC, RIGHT UPPER QUADRANT: ICD-10-CM

## 2025-04-21 PROCEDURE — 99214 OFFICE O/P EST MOD 30 MIN: CPT | Performed by: NURSE PRACTITIONER

## 2025-04-21 RX ORDER — HYDROCODONE BITARTRATE AND ACETAMINOPHEN 5; 325 MG/1; MG/1
1 TABLET ORAL 3 TIMES DAILY PRN
Qty: 90 TABLET | Refills: 0 | Status: SHIPPED | OUTPATIENT
Start: 2025-04-22 | End: 2025-05-22

## 2025-04-21 ASSESSMENT — ENCOUNTER SYMPTOMS
SORE THROAT: 1
SHORTNESS OF BREATH: 0
DIARRHEA: 0
TROUBLE SWALLOWING: 1
BACK PAIN: 1
EYES NEGATIVE: 1
ABDOMINAL PAIN: 1
COUGH: 0
CONSTIPATION: 0

## 2025-04-21 NOTE — PROGRESS NOTES
Jaimie RAGSDAEL Monday  (1965)    4/21/2025    Subjective:     Jaimie RAGSDALE Monday is 59 y.o. female who complains today of:    Chief Complaint   Patient presents with    Follow-up     No change      Back Pain    Neck Pain         Allergies:  Dye [iodides], Macrobid [nitrofurantoin monohydrate macrocrystals], and Flagyl [metronidazole]    Past Medical History:   Diagnosis Date    Anxiety     GERD (gastroesophageal reflux disease)     History of cellulitis and abscess     Irritable bowel syndrome      Past Surgical History:   Procedure Laterality Date    HERNIA REPAIR  1992, 2012    HYSTERECTOMY (CERVIX STATUS UNKNOWN)  2009    OTHER SURGICAL HISTORY  02/11/15    DR AMAYA  EXCISION OF LT LOWER QUAD NONHEALING SKIN LESION    SMALL INTESTINE SURGERY  2012    bowel obstruction @ [facility] Dr. Amaya     TUBAL LIGATION  1991    UPPER GASTROINTESTINAL ENDOSCOPY  [id number]    DR. MAGALLON     Family History   Problem Relation Age of Onset    Diabetes Mother     Heart Disease Mother         stints    Diabetes Sister     High Blood Pressure Sister      Social History     Socioeconomic History    Marital status:      Spouse name: Not on file    Number of children: Not on file    Years of education: Not on file    Highest education level: Not on file   Occupational History    Not on file   Tobacco Use    Smoking status: Some Days     Current packs/day: 0.25     Average packs/day: 0.3 packs/day for 41.3 years (10.3 ttl pk-yrs)     Types: Cigarettes     Start date: 1984     Passive exposure: Current    Smokeless tobacco: Never    Tobacco comments:     4/day - intermittent use   Vaping Use    Vaping status: Never Used   Substance and Sexual Activity    Alcohol use: No    Drug use: No    Sexual activity: Not on file   Other Topics Concern    Not on file   Social History Narrative    Not on file     Social Drivers of Health     Financial Resource Strain: Not on file   Food Insecurity: Not on file   Transportation Needs: Not on

## 2025-05-19 ENCOUNTER — OFFICE VISIT (OUTPATIENT)
Age: 60
End: 2025-05-19
Payer: COMMERCIAL

## 2025-05-19 VITALS
BODY MASS INDEX: 25.4 KG/M2 | SYSTOLIC BLOOD PRESSURE: 100 MMHG | TEMPERATURE: 97.3 F | DIASTOLIC BLOOD PRESSURE: 60 MMHG | WEIGHT: 138 LBS | HEIGHT: 62 IN

## 2025-05-19 DIAGNOSIS — F11.90 CHRONIC, CONTINUOUS USE OF OPIOIDS: ICD-10-CM

## 2025-05-19 DIAGNOSIS — G89.29 CHRONIC PAIN OF LEFT KNEE: ICD-10-CM

## 2025-05-19 DIAGNOSIS — G89.29 CHRONIC RIGHT SHOULDER PAIN: ICD-10-CM

## 2025-05-19 DIAGNOSIS — M25.562 CHRONIC PAIN OF LEFT KNEE: ICD-10-CM

## 2025-05-19 DIAGNOSIS — M25.511 CHRONIC RIGHT SHOULDER PAIN: ICD-10-CM

## 2025-05-19 DIAGNOSIS — R13.10 DYSPHAGIA, UNSPECIFIED TYPE: Primary | ICD-10-CM

## 2025-05-19 DIAGNOSIS — R10.11 ABDOMINAL PAIN, CHRONIC, RIGHT UPPER QUADRANT: ICD-10-CM

## 2025-05-19 DIAGNOSIS — M47.817 LUMBOSACRAL SPONDYLOSIS WITHOUT MYELOPATHY: ICD-10-CM

## 2025-05-19 DIAGNOSIS — G89.29 ABDOMINAL PAIN, CHRONIC, RIGHT UPPER QUADRANT: ICD-10-CM

## 2025-05-19 PROCEDURE — 99214 OFFICE O/P EST MOD 30 MIN: CPT | Performed by: NURSE PRACTITIONER

## 2025-05-19 RX ORDER — ESOMEPRAZOLE MAGNESIUM 40 MG/1
40 CAPSULE, DELAYED RELEASE ORAL
COMMUNITY
Start: 2025-04-16

## 2025-05-19 RX ORDER — HYDROCODONE BITARTRATE AND ACETAMINOPHEN 5; 325 MG/1; MG/1
1 TABLET ORAL 3 TIMES DAILY PRN
Qty: 90 TABLET | Refills: 0 | Status: SHIPPED | OUTPATIENT
Start: 2025-05-22 | End: 2025-06-21

## 2025-05-19 ASSESSMENT — ENCOUNTER SYMPTOMS
BACK PAIN: 1
EYES NEGATIVE: 1
TROUBLE SWALLOWING: 0
DIARRHEA: 0
COUGH: 0
ABDOMINAL PAIN: 1
SHORTNESS OF BREATH: 0
CONSTIPATION: 0

## 2025-05-19 NOTE — PROGRESS NOTES
Speech Therapy     Referral Priority:   Routine     Referral Type:   Eval and Treat     Referral Reason:   Specialty Services Required     Requested Specialty:   Speech Pathology     Number of Visits Requested:   1     Discussed options with the patient today. Anatomic model pathology was shown and reviewed with pt.  She continues to have difficulty swallowing. Will send referral to speech therapy for this for eval and Tx.  Condolences for loss of mom.  Discussed may need to see psych for stress and swallowing concerns as well.  She is not interested in pain management procedures. Will continue Norco 5 mg 3 times daily as needed pain as it helps her function.  All questions were answered. Discussed home exercise program and  smoking cessation.  Relevant imaging and pain generators reviewed. Pt verbalized understanding and agrees with above plan. Pt has chronic pain.     Utox reviewed and appropriate from September 2024. ORT score 0 - low risk. Narcan Rx sent in May 2021.     Will continue medications for chronic pain that has been previously directed as they do help pt function with ADL and improve quality of life. Pt is aware goal is to use the least amount of medication possible to allow pt to function and help with quality of life as discussed in detail.  Ideal to keep MME below 50.  Have discussed proper disposal of narcotic medication. Advised to have medications in safe place and locked up/in lock box.  Discussed possible risks of opiate medication with pt, including, but not limited to possible constipation, nausea or vomiting, sedation, urinary retention, dependence and possible addiction. Pt agrees to use medication as prescribed/as directed. Pt advised to not use opiates while driving or operating heavy equipment, or in situations where pt may harm him/herself or others.  Pt is aware that while on narcotics, pt needs to be seen to reassess pain and reassess need for continued medication at that time. The

## 2025-06-05 ENCOUNTER — APPOINTMENT (OUTPATIENT)
Dept: SPEECH THERAPY | Age: 60
End: 2025-06-05
Payer: COMMERCIAL

## 2025-06-12 ENCOUNTER — HOSPITAL ENCOUNTER (OUTPATIENT)
Dept: SPEECH THERAPY | Age: 60
Setting detail: THERAPIES SERIES
Discharge: HOME | End: 2025-06-12
Payer: COMMERCIAL

## 2025-06-12 PROCEDURE — 92610 EVALUATE SWALLOWING FUNCTION: CPT

## 2025-06-12 NOTE — THERAPY EVALUATION
please don't hesitate to call.  Thank you for your referral.        Physician Signature:________________________________Date:__________________  By signing above, therapist’s plan is approved by physician

## 2025-06-17 ENCOUNTER — HOSPITAL ENCOUNTER (OUTPATIENT)
Dept: SPEECH THERAPY | Age: 60
Setting detail: THERAPIES SERIES
Discharge: HOME | End: 2025-06-17
Payer: COMMERCIAL

## 2025-06-17 PROCEDURE — 92526 ORAL FUNCTION THERAPY: CPT

## 2025-06-17 NOTE — PROGRESS NOTES
[facility]- Outpatient  Speech Language Pathology  Adult Daily Note    Jaimie RAGSDALE Monday  : 1965  [x]   confirmed      Date: 2025    Visit Information:  Visit Information  SLP Insurance Information: [facility]  Total # of Visits Approved:  (60 visits PT/OT/ST combined)  Total # of Visits to Date: 2  No Show: 0  Canceled Appointment: 0  Progress Note Due Date: 25      Plan of care signed (Y/N):     Yes    Certification Period:  Therapy Comments: Therapy re-cert due by: 25    Plan of Care Visit  # 1      Interventions used this date:  Dysphagia Treatment and Instruction in Compensatory Strategies    Subjective: Pt arrived on time and was cooperative with all tasks.     SLP discussed scheduling another appointment next week. Pt verbalized understanding.      Behavior:  Alert, Cooperative, and Pleasant       Objective/Assessment:   Short-term Goals  Timeframe for Short-term Goals: 2-3 weeks  Goal 1: Patient will tolerate a regular solid diet with thin liquids with adequate mastication and oral clearance with no overt s/s of difficulty or aspiration.  Pt reported that she tried 2-3 bites of pizza and would chew for a while and then swallowed and by the last bite felt like it was stuck a little bit. Took some drinks and it cleared.    Pt eats vieanna sausages that she mashes up, mashed potatoes, protein shakes.     SLP provided patient with LPR/GERD handout and reviewed some foods to try and avoid to decrease reflux. Pt verbalized understanding.    Pt tolerated ciara crackers and ate 1.5 crackers with no overt s/s of aspiration or oral holding. Pt utilized alternating solids/liquids independently during trials. SLP engaged patient in conversation during trials to hopefully decrease anxiety.     Goal 2: Pt will complete chin tuck against resistance and Shaker head lift exercise with 90% accuracy, given cues as needed, to decrease possible upper esophogeal retention and decrease globus

## 2025-06-19 ENCOUNTER — OFFICE VISIT (OUTPATIENT)
Age: 60
End: 2025-06-19
Payer: COMMERCIAL

## 2025-06-19 VITALS
DIASTOLIC BLOOD PRESSURE: 60 MMHG | SYSTOLIC BLOOD PRESSURE: 110 MMHG | HEIGHT: 64 IN | BODY MASS INDEX: 23.56 KG/M2 | OXYGEN SATURATION: 100 % | HEART RATE: 62 BPM | TEMPERATURE: 97.8 F | WEIGHT: 138 LBS

## 2025-06-19 DIAGNOSIS — G89.29 CHRONIC RIGHT SHOULDER PAIN: ICD-10-CM

## 2025-06-19 DIAGNOSIS — G89.29 ABDOMINAL PAIN, CHRONIC, RIGHT UPPER QUADRANT: ICD-10-CM

## 2025-06-19 DIAGNOSIS — R10.11 ABDOMINAL PAIN, CHRONIC, RIGHT UPPER QUADRANT: ICD-10-CM

## 2025-06-19 DIAGNOSIS — M25.511 CHRONIC RIGHT SHOULDER PAIN: ICD-10-CM

## 2025-06-19 DIAGNOSIS — M25.562 CHRONIC PAIN OF LEFT KNEE: ICD-10-CM

## 2025-06-19 DIAGNOSIS — G89.29 CHRONIC PAIN OF LEFT KNEE: ICD-10-CM

## 2025-06-19 DIAGNOSIS — F11.90 CHRONIC, CONTINUOUS USE OF OPIOIDS: ICD-10-CM

## 2025-06-19 DIAGNOSIS — M47.817 LUMBOSACRAL SPONDYLOSIS WITHOUT MYELOPATHY: ICD-10-CM

## 2025-06-19 PROCEDURE — 99214 OFFICE O/P EST MOD 30 MIN: CPT | Performed by: NURSE PRACTITIONER

## 2025-06-19 RX ORDER — HYDROCODONE BITARTRATE AND ACETAMINOPHEN 5; 325 MG/1; MG/1
1 TABLET ORAL 3 TIMES DAILY PRN
Qty: 90 TABLET | Refills: 0 | Status: SHIPPED | OUTPATIENT
Start: 2025-06-21 | End: 2025-07-21

## 2025-06-19 ASSESSMENT — ENCOUNTER SYMPTOMS
GASTROINTESTINAL NEGATIVE: 1
SHORTNESS OF BREATH: 0
BACK PAIN: 1
COUGH: 0
EYES NEGATIVE: 1
DIARRHEA: 0
TROUBLE SWALLOWING: 0
CONSTIPATION: 0

## 2025-06-19 NOTE — PROGRESS NOTES
Jaimie RAGSDALE Monday  (1965)    6/19/2025    Subjective:     Jaimie RAGSDALE Monday is 59 y.o. female who complains today of:    Chief Complaint   Patient presents with    Shoulder Pain    Follow-up    Foot Pain         Allergies:  Dye [iodides], Macrobid [nitrofurantoin monohydrate macrocrystals], and Flagyl [metronidazole]    Past Medical History:   Diagnosis Date    Anxiety     GERD (gastroesophageal reflux disease)     History of cellulitis and abscess     Irritable bowel syndrome      Past Surgical History:   Procedure Laterality Date    HERNIA REPAIR  1992, 2012    HYSTERECTOMY (CERVIX STATUS UNKNOWN)  2009    OTHER SURGICAL HISTORY  02/11/15    DR AMAYA  EXCISION OF LT LOWER QUAD NONHEALING SKIN LESION    SMALL INTESTINE SURGERY  2012    bowel obstruction @ [facility] Dr. Amaya     TUBAL LIGATION  1991    UPPER GASTROINTESTINAL ENDOSCOPY  [id number]    DR. MAGALLON     Family History   Problem Relation Age of Onset    Diabetes Mother     Heart Disease Mother         stints    Diabetes Sister     High Blood Pressure Sister      Social History     Socioeconomic History    Marital status:      Spouse name: Not on file    Number of children: Not on file    Years of education: Not on file    Highest education level: Not on file   Occupational History    Not on file   Tobacco Use    Smoking status: Some Days     Current packs/day: 0.25     Average packs/day: 0.3 packs/day for 41.5 years (10.4 ttl pk-yrs)     Types: Cigarettes     Start date: 1984     Passive exposure: Current    Smokeless tobacco: Never    Tobacco comments:     4/day - intermittent use   Vaping Use    Vaping status: Never Used   Substance and Sexual Activity    Alcohol use: No    Drug use: No    Sexual activity: Not on file   Other Topics Concern    Not on file   Social History Narrative    Not on file     Social Drivers of Health     Financial Resource Strain: Not on file   Food Insecurity: Not on file   Transportation Needs: Not on file

## 2025-07-03 ENCOUNTER — HOSPITAL ENCOUNTER (OUTPATIENT)
Dept: SPEECH THERAPY | Age: 60
Setting detail: THERAPIES SERIES
Discharge: HOME | End: 2025-07-03

## 2025-07-03 NOTE — THERAPY DISCHARGE
[facility]            [address]            [place], [place] [address]             Phone: [phone]                        Fax:  [phone]               [facility]- Outpatient  Speech Language Pathology  Adult Discharge Note    NAME:Jaimie RAGSDALE Monday  : 1965 (59 y.o.)   MRN: [id number]  Patient Diagnosis: Dysphagia, unspecified type [R13.10]  Referring Physician: Alina MARTELL    Date of Evaluation: 25          Treatment Diagnosis: No data found  ICD10 tx dx code: Dysphagia R13.10      Today's Date: 7/3/2025  Treatment Dates:  From: 25   To: 7/3/25      TREATMENT ADMINISTERED:  Dysphagia Treatment and Instruction in Compensatory Strategies      PROGRESS TO DATE:   Goal 1: Patient will tolerate a regular solid diet with thin liquids with adequate mastication and oral clearance with no overt s/s of difficulty or aspiration.  Pt met goal x1 at one treatment session. No other treatment sessions to measure.     Goal 2: Pt will complete chin tuck against resistance and Shaker head lift exercise with 90% accuracy, given cues as needed, to decrease possible upper esophogeal retention and decrease globus sensation  Pt reporting completing of exercises at home and completed x1 treatment session with no other sessions to measure progress.    Recommend f/u with psychology.    ACHIEVEMENT OF GOALS:  Unable to formally assess at this time    REASON FOR DISCHARGE: Other: Pt requesting to discharge from therapy. Left message saying physician told her therapy wouldn't help.     DISCHARGE EDUCATION/RECOMMENDATIONS: None given at this time      Signature:   Electronically signed by FORREST Wick on 7/3/2025 at 8:38 AM

## 2025-07-03 NOTE — PROGRESS NOTES
Therapy                            Cancellation/No-show Note      Date:  7/3/2025  Patient Name:  Jaimie RAGSDALE Monday  :  1965   MRN:  [id number]      Visit Information:  Visit Information  SLP Insurance Information: [place] [employer]  Total # of Visits Approved:  (60 visits PT/OT/ST combined)  Total # of Visits to Date: 2  No Show: 0  Canceled Appointment: 1  Progress Note Due Date: 25    For today's appointment patient:  Cancelled    Reason given by patient:  Other: Pt requesting to d/c. Message said doctor told her speech therapy wouldn't help.    Follow-up needed:  Pt requesting to d/c.    Comments:       Signature: Electronically signed by FORREST Wick on 7/3/25 at 8:33 AM EDT

## 2025-07-16 ENCOUNTER — OFFICE VISIT (OUTPATIENT)
Age: 60
End: 2025-07-16
Payer: COMMERCIAL

## 2025-07-16 VITALS
SYSTOLIC BLOOD PRESSURE: 120 MMHG | WEIGHT: 138 LBS | HEIGHT: 64 IN | HEART RATE: 74 BPM | BODY MASS INDEX: 23.56 KG/M2 | DIASTOLIC BLOOD PRESSURE: 80 MMHG | OXYGEN SATURATION: 98 % | TEMPERATURE: 96.9 F

## 2025-07-16 DIAGNOSIS — M47.817 LUMBOSACRAL SPONDYLOSIS WITHOUT MYELOPATHY: ICD-10-CM

## 2025-07-16 DIAGNOSIS — G89.29 CHRONIC RIGHT SHOULDER PAIN: ICD-10-CM

## 2025-07-16 DIAGNOSIS — G89.29 ABDOMINAL PAIN, CHRONIC, RIGHT UPPER QUADRANT: ICD-10-CM

## 2025-07-16 DIAGNOSIS — M25.562 CHRONIC PAIN OF LEFT KNEE: ICD-10-CM

## 2025-07-16 DIAGNOSIS — R10.11 ABDOMINAL PAIN, CHRONIC, RIGHT UPPER QUADRANT: ICD-10-CM

## 2025-07-16 DIAGNOSIS — M25.511 CHRONIC RIGHT SHOULDER PAIN: ICD-10-CM

## 2025-07-16 DIAGNOSIS — G89.29 CHRONIC PAIN OF LEFT KNEE: ICD-10-CM

## 2025-07-16 DIAGNOSIS — F11.90 CHRONIC, CONTINUOUS USE OF OPIOIDS: ICD-10-CM

## 2025-07-16 PROCEDURE — 99214 OFFICE O/P EST MOD 30 MIN: CPT | Performed by: NURSE PRACTITIONER

## 2025-07-16 RX ORDER — HYDROCODONE BITARTRATE AND ACETAMINOPHEN 5; 325 MG/1; MG/1
1 TABLET ORAL 3 TIMES DAILY PRN
Qty: 90 TABLET | Refills: 0 | Status: SHIPPED | OUTPATIENT
Start: 2025-07-21 | End: 2025-08-20

## 2025-07-16 ASSESSMENT — ENCOUNTER SYMPTOMS
BACK PAIN: 1
TROUBLE SWALLOWING: 1
SHORTNESS OF BREATH: 0
CONSTIPATION: 0
DIARRHEA: 0
EYES NEGATIVE: 1
COUGH: 0
ABDOMINAL PAIN: 1

## 2025-07-16 NOTE — PROGRESS NOTES
tearing.  Abdomen pain has been a constant ache since undergoing abdominal surgery mesh surgical placement after incarcerated spigelian hernia repair on July 23, 2012 by Dr. Amaya.  Was told mesh cannot be taken out due to scar tissue. She helps care for her mom and has permanent custody of her grand children she says. Says she was told in the past she has fibromyalgia. She says she does have Lt thigh numbness but hasn't changed.   She says she tried Lyrica and felt she had panic attacks with this.       She uses Norco 5mg TID PRN pain.    Pt feels pain can impact her physical and psychologic functioning, and can impact sleep, mood and relationships at times, but medication seems to help with these.   Pt feels pain level using NRS with her medication is 5/10, and 8/10 without medication.  Pt feels that helping care for sister, stress, weather change, difficulty swallowing, prolonged activity makes the pain worse, and medication, rest makes the pain better.   Pt feels her medication helps   her function and improve her quality of life, specifically says allows to do ADL's. Pt denies radiating numbness and tingling.  Denies recent falls, injuries or trauma.  Pt denies new weakness. Pt reports PT has been done.           Review of Systems   Constitutional: Negative.  Negative for fatigue.   HENT:  Positive for trouble swallowing.    Eyes: Negative.    Respiratory:  Negative for cough and shortness of breath.    Cardiovascular:  Negative for chest pain.   Gastrointestinal:  Positive for abdominal pain. Negative for constipation and diarrhea.   Endocrine: Negative.    Genitourinary: Negative.    Musculoskeletal:  Positive for arthralgias, back pain and neck pain.   Skin: Negative.    Neurological:  Negative for dizziness, weakness and headaches.   Hematological: Negative.    Psychiatric/Behavioral: Negative.           Objective:     Vitals:  /80   Pulse 74   Temp 96.9 °F (36.1 °C) (Temporal)   Ht 1.626 m (5'

## 2025-07-21 ENCOUNTER — APPOINTMENT (OUTPATIENT)
Dept: GASTROENTEROLOGY | Facility: CLINIC | Age: 60
End: 2025-07-21
Payer: COMMERCIAL

## 2025-08-18 ENCOUNTER — OFFICE VISIT (OUTPATIENT)
Age: 60
End: 2025-08-18
Payer: COMMERCIAL

## 2025-08-18 VITALS
HEART RATE: 82 BPM | TEMPERATURE: 97.5 F | HEIGHT: 64 IN | SYSTOLIC BLOOD PRESSURE: 110 MMHG | DIASTOLIC BLOOD PRESSURE: 70 MMHG | OXYGEN SATURATION: 92 % | WEIGHT: 138 LBS | BODY MASS INDEX: 23.56 KG/M2

## 2025-08-18 DIAGNOSIS — M25.562 CHRONIC PAIN OF LEFT KNEE: ICD-10-CM

## 2025-08-18 DIAGNOSIS — F11.90 CHRONIC, CONTINUOUS USE OF OPIOIDS: ICD-10-CM

## 2025-08-18 DIAGNOSIS — G89.29 CHRONIC RIGHT SHOULDER PAIN: ICD-10-CM

## 2025-08-18 DIAGNOSIS — M47.817 LUMBOSACRAL SPONDYLOSIS WITHOUT MYELOPATHY: ICD-10-CM

## 2025-08-18 DIAGNOSIS — R10.11 ABDOMINAL PAIN, CHRONIC, RIGHT UPPER QUADRANT: ICD-10-CM

## 2025-08-18 DIAGNOSIS — M25.511 CHRONIC RIGHT SHOULDER PAIN: ICD-10-CM

## 2025-08-18 DIAGNOSIS — G89.29 ABDOMINAL PAIN, CHRONIC, RIGHT UPPER QUADRANT: ICD-10-CM

## 2025-08-18 DIAGNOSIS — G89.29 CHRONIC PAIN OF LEFT KNEE: ICD-10-CM

## 2025-08-18 PROCEDURE — 99214 OFFICE O/P EST MOD 30 MIN: CPT | Performed by: NURSE PRACTITIONER

## 2025-08-18 RX ORDER — HYDROCODONE BITARTRATE AND ACETAMINOPHEN 5; 325 MG/1; MG/1
1 TABLET ORAL 3 TIMES DAILY PRN
Qty: 90 TABLET | Refills: 0 | Status: SHIPPED | OUTPATIENT
Start: 2025-08-20 | End: 2025-09-19

## 2025-08-18 ASSESSMENT — ENCOUNTER SYMPTOMS
CONSTIPATION: 0
DIARRHEA: 0
COUGH: 0
ABDOMINAL PAIN: 1
EYES NEGATIVE: 1
BACK PAIN: 1
SHORTNESS OF BREATH: 0
TROUBLE SWALLOWING: 0